# Patient Record
Sex: FEMALE | Race: WHITE | NOT HISPANIC OR LATINO
[De-identification: names, ages, dates, MRNs, and addresses within clinical notes are randomized per-mention and may not be internally consistent; named-entity substitution may affect disease eponyms.]

---

## 2017-05-15 PROBLEM — Z00.00 ENCOUNTER FOR PREVENTIVE HEALTH EXAMINATION: Status: ACTIVE | Noted: 2017-05-15

## 2017-05-17 ENCOUNTER — APPOINTMENT (OUTPATIENT)
Dept: PULMONOLOGY | Facility: CLINIC | Age: 82
End: 2017-05-17

## 2017-05-17 VITALS
OXYGEN SATURATION: 94 % | DIASTOLIC BLOOD PRESSURE: 60 MMHG | SYSTOLIC BLOOD PRESSURE: 110 MMHG | WEIGHT: 197 LBS | BODY MASS INDEX: 41.35 KG/M2 | HEIGHT: 58 IN | HEART RATE: 109 BPM

## 2017-05-17 DIAGNOSIS — Z80.1 FAMILY HISTORY OF MALIGNANT NEOPLASM OF TRACHEA, BRONCHUS AND LUNG: ICD-10-CM

## 2017-05-17 DIAGNOSIS — H04.123 DRY EYE SYNDROME OF BILATERAL LACRIMAL GLANDS: ICD-10-CM

## 2017-05-17 DIAGNOSIS — Z87.891 PERSONAL HISTORY OF NICOTINE DEPENDENCE: ICD-10-CM

## 2017-05-17 DIAGNOSIS — Z86.79 PERSONAL HISTORY OF OTHER DISEASES OF THE CIRCULATORY SYSTEM: ICD-10-CM

## 2017-05-17 DIAGNOSIS — Z82.49 FAMILY HISTORY OF ISCHEMIC HEART DISEASE AND OTHER DISEASES OF THE CIRCULATORY SYSTEM: ICD-10-CM

## 2017-05-17 RX ORDER — ASPIRIN 81 MG
81 TABLET, DELAYED RELEASE (ENTERIC COATED) ORAL
Refills: 0 | Status: ACTIVE | COMMUNITY

## 2017-05-17 RX ORDER — ERGOCALCIFEROL 1.25 MG/1
CAPSULE ORAL
Refills: 0 | Status: ACTIVE | COMMUNITY

## 2017-05-17 RX ORDER — ASCORBIC ACID 500 MG
500 TABLET ORAL
Refills: 0 | Status: ACTIVE | COMMUNITY

## 2017-05-17 RX ORDER — FUROSEMIDE 20 MG/1
20 TABLET ORAL
Refills: 0 | Status: ACTIVE | COMMUNITY

## 2017-05-17 RX ORDER — VITAMIN B COMPLEX
CAPSULE ORAL
Refills: 0 | Status: ACTIVE | COMMUNITY

## 2017-09-21 ENCOUNTER — APPOINTMENT (OUTPATIENT)
Dept: PULMONOLOGY | Facility: CLINIC | Age: 82
End: 2017-09-21

## 2018-02-16 ENCOUNTER — INPATIENT (INPATIENT)
Facility: HOSPITAL | Age: 83
LOS: 9 days | Discharge: STILL ADMITTED OR ADMINISTRATIVE | DRG: 193 | End: 2018-02-26
Attending: INTERNAL MEDICINE | Admitting: GENERAL ACUTE CARE HOSPITAL
Payer: MEDICARE

## 2018-02-16 VITALS
HEART RATE: 79 BPM | SYSTOLIC BLOOD PRESSURE: 130 MMHG | RESPIRATION RATE: 18 BRPM | TEMPERATURE: 98 F | HEIGHT: 60 IN | OXYGEN SATURATION: 94 % | WEIGHT: 199.96 LBS | DIASTOLIC BLOOD PRESSURE: 80 MMHG

## 2018-02-16 DIAGNOSIS — H26.40 UNSPECIFIED SECONDARY CATARACT: Chronic | ICD-10-CM

## 2018-02-16 DIAGNOSIS — Z45.018 ENCOUNTER FOR ADJUSTMENT AND MANAGEMENT OF OTHER PART OF CARDIAC PACEMAKER: Chronic | ICD-10-CM

## 2018-02-16 DIAGNOSIS — J18.9 PNEUMONIA, UNSPECIFIED ORGANISM: ICD-10-CM

## 2018-02-16 DIAGNOSIS — Z95.0 PRESENCE OF CARDIAC PACEMAKER: Chronic | ICD-10-CM

## 2018-02-16 DIAGNOSIS — Z98.89 OTHER SPECIFIED POSTPROCEDURAL STATES: Chronic | ICD-10-CM

## 2018-02-16 LAB
ALBUMIN SERPL ELPH-MCNC: 3.5 G/DL — SIGNIFICANT CHANGE UP (ref 3.3–5.2)
ALP SERPL-CCNC: 66 U/L — SIGNIFICANT CHANGE UP (ref 40–120)
ALT FLD-CCNC: 19 U/L — SIGNIFICANT CHANGE UP
ANION GAP SERPL CALC-SCNC: 15 MMOL/L — SIGNIFICANT CHANGE UP (ref 5–17)
APPEARANCE UR: CLEAR — SIGNIFICANT CHANGE UP
AST SERPL-CCNC: 30 U/L — SIGNIFICANT CHANGE UP
BACTERIA # UR AUTO: ABNORMAL
BASOPHILS # BLD AUTO: 0 K/UL — SIGNIFICANT CHANGE UP (ref 0–0.2)
BASOPHILS NFR BLD AUTO: 1 % — SIGNIFICANT CHANGE UP (ref 0–2)
BILIRUB SERPL-MCNC: 0.3 MG/DL — LOW (ref 0.4–2)
BILIRUB UR-MCNC: NEGATIVE — SIGNIFICANT CHANGE UP
BUN SERPL-MCNC: 36 MG/DL — HIGH (ref 8–20)
CALCIUM SERPL-MCNC: 8.9 MG/DL — SIGNIFICANT CHANGE UP (ref 8.6–10.2)
CHLORIDE SERPL-SCNC: 101 MMOL/L — SIGNIFICANT CHANGE UP (ref 98–107)
CO2 SERPL-SCNC: 21 MMOL/L — LOW (ref 22–29)
COLOR SPEC: YELLOW — SIGNIFICANT CHANGE UP
CREAT SERPL-MCNC: 1.13 MG/DL — SIGNIFICANT CHANGE UP (ref 0.5–1.3)
DIFF PNL FLD: ABNORMAL
EOSINOPHIL # BLD AUTO: 0 K/UL — SIGNIFICANT CHANGE UP (ref 0–0.5)
EPI CELLS # UR: SIGNIFICANT CHANGE UP
GLUCOSE SERPL-MCNC: 137 MG/DL — HIGH (ref 70–115)
GLUCOSE UR QL: NEGATIVE MG/DL — SIGNIFICANT CHANGE UP
HCT VFR BLD CALC: 38 % — SIGNIFICANT CHANGE UP (ref 37–47)
HGB BLD-MCNC: 12.4 G/DL — SIGNIFICANT CHANGE UP (ref 12–16)
KETONES UR-MCNC: NEGATIVE — SIGNIFICANT CHANGE UP
LEUKOCYTE ESTERASE UR-ACNC: ABNORMAL
LYMPHOCYTES # BLD AUTO: 1.3 K/UL — SIGNIFICANT CHANGE UP (ref 1–4.8)
LYMPHOCYTES # BLD AUTO: 11 % — LOW (ref 20–55)
MCHC RBC-ENTMCNC: 27.9 PG — SIGNIFICANT CHANGE UP (ref 27–31)
MCHC RBC-ENTMCNC: 32.6 G/DL — SIGNIFICANT CHANGE UP (ref 32–36)
MCV RBC AUTO: 85.6 FL — SIGNIFICANT CHANGE UP (ref 81–99)
MONOCYTES # BLD AUTO: 0.5 K/UL — SIGNIFICANT CHANGE UP (ref 0–0.8)
MONOCYTES NFR BLD AUTO: 4 % — SIGNIFICANT CHANGE UP (ref 3–10)
NEUTROPHILS # BLD AUTO: 9.6 K/UL — HIGH (ref 1.8–8)
NEUTROPHILS NFR BLD AUTO: 80 % — HIGH (ref 37–73)
NITRITE UR-MCNC: POSITIVE
NT-PROBNP SERPL-SCNC: 1332 PG/ML — HIGH (ref 0–300)
PH UR: 6 — SIGNIFICANT CHANGE UP (ref 5–8)
PLAT MORPH BLD: NORMAL — SIGNIFICANT CHANGE UP
PLATELET # BLD AUTO: 228 K/UL — SIGNIFICANT CHANGE UP (ref 150–400)
POTASSIUM SERPL-MCNC: 5 MMOL/L — SIGNIFICANT CHANGE UP (ref 3.5–5.3)
POTASSIUM SERPL-SCNC: 5 MMOL/L — SIGNIFICANT CHANGE UP (ref 3.5–5.3)
PROT SERPL-MCNC: 7 G/DL — SIGNIFICANT CHANGE UP (ref 6.6–8.7)
PROT UR-MCNC: 15 MG/DL
RAPID RVP RESULT: SIGNIFICANT CHANGE UP
RBC # BLD: 4.44 M/UL — SIGNIFICANT CHANGE UP (ref 4.4–5.2)
RBC # FLD: 15.7 % — HIGH (ref 11–15.6)
RBC BLD AUTO: NORMAL — SIGNIFICANT CHANGE UP
RBC CASTS # UR COMP ASSIST: SIGNIFICANT CHANGE UP /HPF (ref 0–4)
SODIUM SERPL-SCNC: 137 MMOL/L — SIGNIFICANT CHANGE UP (ref 135–145)
SP GR SPEC: 1.01 — SIGNIFICANT CHANGE UP (ref 1.01–1.02)
TROPONIN T SERPL-MCNC: <0.01 NG/ML — SIGNIFICANT CHANGE UP (ref 0–0.06)
UROBILINOGEN FLD QL: NEGATIVE MG/DL — SIGNIFICANT CHANGE UP
VARIANT LYMPHS # BLD: 4 % — SIGNIFICANT CHANGE UP (ref 0–6)
WBC # BLD: 11.4 K/UL — HIGH (ref 4.8–10.8)
WBC # FLD AUTO: 11.4 K/UL — HIGH (ref 4.8–10.8)
WBC UR QL: ABNORMAL

## 2018-02-16 PROCEDURE — 99285 EMERGENCY DEPT VISIT HI MDM: CPT

## 2018-02-16 PROCEDURE — 71045 X-RAY EXAM CHEST 1 VIEW: CPT | Mod: 26

## 2018-02-16 RX ORDER — METOPROLOL TARTRATE 50 MG
25 TABLET ORAL DAILY
Qty: 0 | Refills: 0 | Status: DISCONTINUED | OUTPATIENT
Start: 2018-02-16 | End: 2018-02-26

## 2018-02-16 RX ORDER — AZITHROMYCIN 500 MG/1
500 TABLET, FILM COATED ORAL ONCE
Qty: 0 | Refills: 0 | Status: DISCONTINUED | OUTPATIENT
Start: 2018-02-16 | End: 2018-02-16

## 2018-02-16 RX ORDER — TIOTROPIUM BROMIDE 18 UG/1
1 CAPSULE ORAL; RESPIRATORY (INHALATION) DAILY
Qty: 0 | Refills: 0 | Status: DISCONTINUED | OUTPATIENT
Start: 2018-02-16 | End: 2018-02-26

## 2018-02-16 RX ORDER — POTASSIUM CHLORIDE 20 MEQ
10 PACKET (EA) ORAL DAILY
Qty: 0 | Refills: 0 | Status: DISCONTINUED | OUTPATIENT
Start: 2018-02-16 | End: 2018-02-26

## 2018-02-16 RX ORDER — AZITHROMYCIN 500 MG/1
400 TABLET, FILM COATED ORAL ONCE
Qty: 0 | Refills: 0 | Status: DISCONTINUED | OUTPATIENT
Start: 2018-02-16 | End: 2018-02-16

## 2018-02-16 RX ORDER — ALBUTEROL 90 UG/1
1 AEROSOL, METERED ORAL EVERY 4 HOURS
Qty: 0 | Refills: 0 | Status: DISCONTINUED | OUTPATIENT
Start: 2018-02-16 | End: 2018-02-26

## 2018-02-16 RX ORDER — AMLODIPINE BESYLATE 2.5 MG/1
10 TABLET ORAL DAILY
Qty: 0 | Refills: 0 | Status: DISCONTINUED | OUTPATIENT
Start: 2018-02-16 | End: 2018-02-16

## 2018-02-16 RX ORDER — AZITHROMYCIN 500 MG/1
500 TABLET, FILM COATED ORAL EVERY 24 HOURS
Qty: 0 | Refills: 0 | Status: DISCONTINUED | OUTPATIENT
Start: 2018-02-17 | End: 2018-02-22

## 2018-02-16 RX ORDER — ENOXAPARIN SODIUM 100 MG/ML
40 INJECTION SUBCUTANEOUS DAILY
Qty: 0 | Refills: 0 | Status: DISCONTINUED | OUTPATIENT
Start: 2018-02-16 | End: 2018-02-26

## 2018-02-16 RX ORDER — CEFTRIAXONE 500 MG/1
1 INJECTION, POWDER, FOR SOLUTION INTRAMUSCULAR; INTRAVENOUS EVERY 24 HOURS
Qty: 0 | Refills: 0 | Status: DISCONTINUED | OUTPATIENT
Start: 2018-02-17 | End: 2018-02-26

## 2018-02-16 RX ORDER — IPRATROPIUM/ALBUTEROL SULFATE 18-103MCG
3 AEROSOL WITH ADAPTER (GRAM) INHALATION EVERY 8 HOURS
Qty: 0 | Refills: 0 | Status: DISCONTINUED | OUTPATIENT
Start: 2018-02-16 | End: 2018-02-20

## 2018-02-16 RX ORDER — AZITHROMYCIN 500 MG/1
500 TABLET, FILM COATED ORAL ONCE
Qty: 0 | Refills: 0 | Status: COMPLETED | OUTPATIENT
Start: 2018-02-16 | End: 2018-02-16

## 2018-02-16 RX ORDER — SODIUM CHLORIDE 9 MG/ML
500 INJECTION INTRAMUSCULAR; INTRAVENOUS; SUBCUTANEOUS ONCE
Qty: 0 | Refills: 0 | Status: COMPLETED | OUTPATIENT
Start: 2018-02-16 | End: 2018-02-16

## 2018-02-16 RX ORDER — SACCHAROMYCES BOULARDII 250 MG
250 POWDER IN PACKET (EA) ORAL
Qty: 0 | Refills: 0 | Status: DISCONTINUED | OUTPATIENT
Start: 2018-02-16 | End: 2018-02-26

## 2018-02-16 RX ORDER — CEFTRIAXONE 500 MG/1
1 INJECTION, POWDER, FOR SOLUTION INTRAMUSCULAR; INTRAVENOUS ONCE
Qty: 0 | Refills: 0 | Status: COMPLETED | OUTPATIENT
Start: 2018-02-16 | End: 2018-02-16

## 2018-02-16 RX ORDER — AZITHROMYCIN 500 MG/1
TABLET, FILM COATED ORAL
Qty: 0 | Refills: 0 | Status: DISCONTINUED | OUTPATIENT
Start: 2018-02-16 | End: 2018-02-22

## 2018-02-16 RX ORDER — SODIUM CHLORIDE 9 MG/ML
3 INJECTION INTRAMUSCULAR; INTRAVENOUS; SUBCUTANEOUS EVERY 8 HOURS
Qty: 0 | Refills: 0 | Status: DISCONTINUED | OUTPATIENT
Start: 2018-02-16 | End: 2018-02-26

## 2018-02-16 RX ORDER — CEFTRIAXONE 500 MG/1
1 INJECTION, POWDER, FOR SOLUTION INTRAMUSCULAR; INTRAVENOUS ONCE
Qty: 0 | Refills: 0 | Status: DISCONTINUED | OUTPATIENT
Start: 2018-02-16 | End: 2018-02-16

## 2018-02-16 RX ORDER — VALSARTAN 80 MG/1
160 TABLET ORAL DAILY
Qty: 0 | Refills: 0 | Status: DISCONTINUED | OUTPATIENT
Start: 2018-02-16 | End: 2018-02-26

## 2018-02-16 RX ORDER — ASPIRIN/CALCIUM CARB/MAGNESIUM 324 MG
81 TABLET ORAL DAILY
Qty: 0 | Refills: 0 | Status: DISCONTINUED | OUTPATIENT
Start: 2018-02-16 | End: 2018-02-17

## 2018-02-16 RX ORDER — CEFTRIAXONE 500 MG/1
INJECTION, POWDER, FOR SOLUTION INTRAMUSCULAR; INTRAVENOUS
Qty: 0 | Refills: 0 | Status: DISCONTINUED | OUTPATIENT
Start: 2018-02-16 | End: 2018-02-26

## 2018-02-16 RX ORDER — FUROSEMIDE 40 MG
20 TABLET ORAL DAILY
Qty: 0 | Refills: 0 | Status: DISCONTINUED | OUTPATIENT
Start: 2018-02-16 | End: 2018-02-26

## 2018-02-16 RX ADMIN — SODIUM CHLORIDE 500 MILLILITER(S): 9 INJECTION INTRAMUSCULAR; INTRAVENOUS; SUBCUTANEOUS at 16:13

## 2018-02-16 RX ADMIN — CEFTRIAXONE 100 GRAM(S): 500 INJECTION, POWDER, FOR SOLUTION INTRAMUSCULAR; INTRAVENOUS at 20:44

## 2018-02-16 RX ADMIN — AZITHROMYCIN 255 MILLIGRAM(S): 500 TABLET, FILM COATED ORAL at 22:12

## 2018-02-16 NOTE — ED ADULT TRIAGE NOTE - CHIEF COMPLAINT QUOTE
Sent from [facility] for SOB and hypoxia. At MD office, RA Spo2 78-84%, improvement to 93% on RA. Patient reports flu negative.

## 2018-02-16 NOTE — ED PROVIDER NOTE - CARE PLAN
Principal Discharge DX:	Pneumonia Principal Discharge DX:	Pneumonia  Secondary Diagnosis:	UTI (urinary tract infection)

## 2018-02-16 NOTE — H&P ADULT - HISTORY OF PRESENT ILLNESS
89 y/o female who lives at home alone independently with walker sent in by PMD after c/o fatigue, cough, SOB. Patient states she has been around multiple sick family members over last week. She also admits to recent treatment for UTI last week, however denies any current urinary symptoms. No CP, N/V/D. Denies hx of CHF/CAD. In ED noted to have PNA with elevated PSI and will need inpatient treatment.

## 2018-02-16 NOTE — ED ADULT NURSE REASSESSMENT NOTE - NS ED NURSE REASSESS COMMENT FT1
Assuming care from previous RN, pt AOx4, denies SOB, resp even and unlabored, sat @ 96% on 2L nasal cannula, skin warm and dry, color good, denies pain/n/v, patent 20G IV in left AC, showing NSR on monitor, pt aware of plan of care, awaiting bed assignment, will continue to monitor.

## 2018-02-16 NOTE — ED PROVIDER NOTE - OBJECTIVE STATEMENT
89yoF with h/o HTN, CHF, PPM presenting with generalized malaise, fatigue x 2 weeks.  Pt states she hasn't been dizzy, isn't having chest pain, but reports intermittent cough. She reports decreased PO intake but endorses drinking fluids. SHe was at the doctor's office today ( Dr. Segundo), when she was found to be hypoxic and was sent to ER.  PT states she was found to have  a UTI recently and was started on antibiotic "[id number]", but discontinued it after 3 days because it gave her diarrhea.

## 2018-02-16 NOTE — ED PROVIDER NOTE - MEDICAL DECISION MAKING DETAILS
Pt presents with generalized malaise for several weeks, recent partially treated UTI, and hypoxia in the setting of recent cough.  Plan to check labs, UA, CXR , reevaluate

## 2018-02-16 NOTE — H&P ADULT - PROBLEM SELECTOR PLAN 1
Patient with no recent hospitalizations. No hx of aspiration or dysphagia. Cont. CAP coverage, check lactate with SIRS and source, Normotensive, non-toxic, RVP neg. Blood cultures added, however patient received Abx in ED prior too. Supplemental 02, nebs, spirometer, OOB/Ambulate, DVT-P, fall risk protocol

## 2018-02-16 NOTE — CHART NOTE - NSCHARTNOTEFT_GEN_A_CORE
REASON FOR Tele-evaluation    SUBJECTIVE: " I just don't feel good all over, I am very tired and weak, and have very little appetite     HPI: 0, patient has been feeling tired weak and loss of appetite for few days, denied fever , admits to cough and SOB on walking with walker . denied CP or pressure , denied focal weakness , no palpitations, no orthopnea, no PND         ROS: negative except HPI      PHYSICAL EXAM: Tele-evaluation precludes physical exam. Pertinent physical exam findings as per videoconference and aide at bedside are as following: patient is sitting up in bed with nasal canula oxygen 3 liters is 94 %. sitting comfortably, AAO times three , face symmetric speech clear.      labs and CTA results reviewed       ASSESSMENT AND PLAN: 89 year old female with known history of htn CAD, CHF PPM , send in from PMD office for hypoxia- secondary to baseline COPD with Pneumonia- Rocephin Zithromax, Duoneb and supportive care , probiotics started   DVt PPX- Lovenox   CHF/htn- cont ACEI , Lasix ,   CAD -on aspirin, not on statin, please eval in Am    Care plan discussed with Chong

## 2018-02-17 DIAGNOSIS — I10 ESSENTIAL (PRIMARY) HYPERTENSION: ICD-10-CM

## 2018-02-17 DIAGNOSIS — N39.0 URINARY TRACT INFECTION, SITE NOT SPECIFIED: ICD-10-CM

## 2018-02-17 DIAGNOSIS — J18.1 LOBAR PNEUMONIA, UNSPECIFIED ORGANISM: ICD-10-CM

## 2018-02-17 LAB
HCT VFR BLD CALC: 34.8 % — LOW (ref 37–47)
HGB BLD-MCNC: 11.2 G/DL — LOW (ref 12–16)
LACTATE SERPL-SCNC: 1.6 MMOL/L — SIGNIFICANT CHANGE UP (ref 0.5–2)
LYMPHOCYTES # BLD AUTO: 9 % — LOW (ref 20–55)
MCHC RBC-ENTMCNC: 27.8 PG — SIGNIFICANT CHANGE UP (ref 27–31)
MCHC RBC-ENTMCNC: 32.2 G/DL — SIGNIFICANT CHANGE UP (ref 32–36)
MCV RBC AUTO: 86.4 FL — SIGNIFICANT CHANGE UP (ref 81–99)
MONOCYTES NFR BLD AUTO: 2 % — LOW (ref 3–10)
NEUTROPHILS NFR BLD AUTO: 89 % — HIGH (ref 37–73)
PLAT MORPH BLD: NORMAL — SIGNIFICANT CHANGE UP
PLATELET # BLD AUTO: 256 K/UL — SIGNIFICANT CHANGE UP (ref 150–400)
RBC # BLD: 4.03 M/UL — LOW (ref 4.4–5.2)
RBC # FLD: 15.8 % — HIGH (ref 11–15.6)
RBC BLD AUTO: NORMAL — SIGNIFICANT CHANGE UP
WBC # BLD: 12.5 K/UL — HIGH (ref 4.8–10.8)
WBC # FLD AUTO: 12.5 K/UL — HIGH (ref 4.8–10.8)

## 2018-02-17 PROCEDURE — 99233 SBSQ HOSP IP/OBS HIGH 50: CPT

## 2018-02-17 RX ORDER — ACETAMINOPHEN 500 MG
650 TABLET ORAL EVERY 6 HOURS
Qty: 0 | Refills: 0 | Status: DISCONTINUED | OUTPATIENT
Start: 2018-02-17 | End: 2018-02-26

## 2018-02-17 RX ORDER — NYSTATIN CREAM 100000 [USP'U]/G
1 CREAM TOPICAL
Qty: 0 | Refills: 0 | Status: DISCONTINUED | OUTPATIENT
Start: 2018-02-17 | End: 2018-02-26

## 2018-02-17 RX ORDER — ASPIRIN/CALCIUM CARB/MAGNESIUM 324 MG
81 TABLET ORAL DAILY
Qty: 0 | Refills: 0 | Status: DISCONTINUED | OUTPATIENT
Start: 2018-02-17 | End: 2018-02-17

## 2018-02-17 RX ORDER — ASPIRIN/CALCIUM CARB/MAGNESIUM 324 MG
81 TABLET ORAL DAILY
Qty: 0 | Refills: 0 | Status: DISCONTINUED | OUTPATIENT
Start: 2018-02-17 | End: 2018-02-26

## 2018-02-17 RX ADMIN — Medication 250 MILLIGRAM(S): at 06:33

## 2018-02-17 RX ADMIN — Medication 3 MILLILITER(S): at 15:21

## 2018-02-17 RX ADMIN — CEFTRIAXONE 100 GRAM(S): 500 INJECTION, POWDER, FOR SOLUTION INTRAMUSCULAR; INTRAVENOUS at 20:05

## 2018-02-17 RX ADMIN — Medication 20 MILLIGRAM(S): at 07:38

## 2018-02-17 RX ADMIN — NYSTATIN CREAM 1 APPLICATION(S): 100000 CREAM TOPICAL at 18:00

## 2018-02-17 RX ADMIN — Medication 81 MILLIGRAM(S): at 18:00

## 2018-02-17 RX ADMIN — ENOXAPARIN SODIUM 40 MILLIGRAM(S): 100 INJECTION SUBCUTANEOUS at 12:41

## 2018-02-17 RX ADMIN — SODIUM CHLORIDE 3 MILLILITER(S): 9 INJECTION INTRAMUSCULAR; INTRAVENOUS; SUBCUTANEOUS at 21:17

## 2018-02-17 RX ADMIN — Medication 3 MILLILITER(S): at 08:43

## 2018-02-17 RX ADMIN — AZITHROMYCIN 255 MILLIGRAM(S): 500 TABLET, FILM COATED ORAL at 19:11

## 2018-02-17 RX ADMIN — SODIUM CHLORIDE 3 MILLILITER(S): 9 INJECTION INTRAMUSCULAR; INTRAVENOUS; SUBCUTANEOUS at 06:32

## 2018-02-17 RX ADMIN — Medication 10 MILLIEQUIVALENT(S): at 12:41

## 2018-02-17 RX ADMIN — VALSARTAN 160 MILLIGRAM(S): 80 TABLET ORAL at 07:37

## 2018-02-17 RX ADMIN — Medication 250 MILLIGRAM(S): at 18:00

## 2018-02-17 RX ADMIN — Medication 3 MILLILITER(S): at 00:13

## 2018-02-17 RX ADMIN — NYSTATIN CREAM 1 APPLICATION(S): 100000 CREAM TOPICAL at 06:34

## 2018-02-17 RX ADMIN — Medication 25 MILLIGRAM(S): at 06:34

## 2018-02-17 RX ADMIN — SODIUM CHLORIDE 3 MILLILITER(S): 9 INJECTION INTRAMUSCULAR; INTRAVENOUS; SUBCUTANEOUS at 14:30

## 2018-02-17 NOTE — PROGRESS NOTE ADULT - PROBLEM SELECTOR PLAN 1
Patient with no recent hospitalizations. No hx of aspiration or dysphagia. Cont with current antibiotics for CAP coverage, checked lactate  WNL, Normotensive, non-toxic, RVP neg, Blood cultures added, Supplemental 02, nebs, spirometer, OOB/Ambulate.

## 2018-02-17 NOTE — PROGRESS NOTE ADULT - SUBJECTIVE AND OBJECTIVE BOX
MANUEL BURRELL    [id number]    89y      Female    Patient is a 89y old  Female who presents with a chief complaint of Cough, generalized weakness (2018 23:42)      INTERVAL HPI/OVERNIGHT EVENTS:  Patient is feeling some improvement of her respiratory symptoms, she has no chest pain, nausea, vomiting, dizziness    REVIEW OF SYSTEMS:    CONSTITUTIONAL: No fever, some fatigue  RESPIRATORY: Improving cough, shortness of breath  CARDIOVASCULAR: No chest pain, palpitations  GASTROINTESTINAL: No abdominal or epigastric pain. No nausea, vomiting  NEUROLOGICAL: No headaches,  loss of strength.  MISCELLANEOUS: No joint swelling or pain       Vital Signs Last 24 Hrs  T(C): 36.7 (2018 20:06), Max: 38.1 (2018 06:23)  T(F): 98.1 (2018 20:06), Max: 100.5 (2018 06:23)  HR: 89 (2018 20:06) (76 - 89)  BP: 106/56 (2018 20:06) (106/56 - 128/62)  BP(mean): 84 (2018 23:42) (84 - 84)  RR: 20 (2018 20:06) (16 - 20)  SpO2: 93% (2018 20:06) (93% - 97%)    PHYSICAL EXAM:    GENERAL: Elderly female looking comfortable   HEENT: PERRL, +EOMI  NECK: soft, Supple, No JVD,   CHEST/LUNG: decrease air entry bilaterally; No wheezing  HEART: S1S2+, Regular rate and rhythm; No murmurs  ABDOMEN: Soft, Nontender, Nondistended; Bowel sounds present  EXTREMITIES:  1+ Peripheral Pulses, No edema  SKIN: No rashes or lesions  NEURO: AAOX3, no focal deficits, no motor r sensory loss  PSYCH: normal mood      LABS:                        11.2   12.5  )-----------( 256      ( 2018 06:36 )             34.8     02-16    137  |  101  |  36.0<H>  ----------------------------<  137<H>  5.0   |  21.0<L>  |  1.13    Ca    8.9      2018 14:14    TPro  7.0  /  Alb  3.5  /  TBili  0.3<L>  /  DBili  x   /  AST  30  /  ALT  19  /  AlkPhos  66  02-16      Urinalysis Basic - ( 2018 17:23 )    Color: Yellow / Appearance: Clear / S.010 / pH: x  Gluc: x / Ketone: Negative  / Bili: Negative / Urobili: Negative mg/dL   Blood: x / Protein: 15 mg/dL / Nitrite: Positive   Leuk Esterase: Moderate / RBC: 0-2 /HPF / WBC 11-25   Sq Epi: x / Non Sq Epi: Occasional / Bacteria: Occasional          I&O's Summary    2018 07:01  -  2018 07:00  --------------------------------------------------------  IN: 240 mL / OUT: 0 mL / NET: 240 mL        MEDICATIONS  (STANDING):  ALBUTerol    90 MICROgram(s) HFA Inhaler 1 Puff(s) Inhalation every 4 hours  ALBUTerol/ipratropium for Nebulization 3 milliLiter(s) Nebulizer every 8 hours  aspirin enteric coated 81 milliGRAM(s) Oral daily  azithromycin  IVPB 500 milliGRAM(s) IV Intermittent every 24 hours  azithromycin  IVPB      cefTRIAXone   IVPB 1 Gram(s) IV Intermittent every 24 hours  cefTRIAXone   IVPB      enoxaparin Injectable 40 milliGRAM(s) SubCutaneous daily  furosemide    Tablet 20 milliGRAM(s) Oral daily  metoprolol     tartrate 25 milliGRAM(s) Oral daily  nystatin Powder 1 Application(s) Topical two times a day  potassium chloride    Tablet ER 10 milliEquivalent(s) Oral daily  saccharomyces boulardii 250 milliGRAM(s) Oral two times a day  sodium chloride 0.9% lock flush 3 milliLiter(s) IV Push every 8 hours  tiotropium 18 MICROgram(s) Capsule 1 Capsule(s) Inhalation daily  valsartan 160 milliGRAM(s) Oral daily    MEDICATIONS  (PRN):  acetaminophen   Tablet 650 milliGRAM(s) Oral every 6 hours PRN For Temp greater than 38 C (100.4 F), pain and headache

## 2018-02-18 LAB
-  AMIKACIN: SIGNIFICANT CHANGE UP
-  AMPICILLIN/SULBACTAM: SIGNIFICANT CHANGE UP
-  AMPICILLIN: SIGNIFICANT CHANGE UP
-  AZTREONAM: SIGNIFICANT CHANGE UP
-  CEFAZOLIN: SIGNIFICANT CHANGE UP
-  CEFEPIME: SIGNIFICANT CHANGE UP
-  CEFOXITIN: SIGNIFICANT CHANGE UP
-  CEFTAZIDIME: SIGNIFICANT CHANGE UP
-  CEFTRIAXONE: SIGNIFICANT CHANGE UP
-  CIPROFLOXACIN: SIGNIFICANT CHANGE UP
-  ERTAPENEM: SIGNIFICANT CHANGE UP
-  GENTAMICIN: SIGNIFICANT CHANGE UP
-  IMIPENEM: SIGNIFICANT CHANGE UP
-  LEVOFLOXACIN: SIGNIFICANT CHANGE UP
-  MEROPENEM: SIGNIFICANT CHANGE UP
-  NITROFURANTOIN: SIGNIFICANT CHANGE UP
-  PIPERACILLIN/TAZOBACTAM: SIGNIFICANT CHANGE UP
-  TOBRAMYCIN: SIGNIFICANT CHANGE UP
-  TRIMETHOPRIM/SULFAMETHOXAZOLE: SIGNIFICANT CHANGE UP
ANION GAP SERPL CALC-SCNC: 12 MMOL/L — SIGNIFICANT CHANGE UP (ref 5–17)
BUN SERPL-MCNC: 28 MG/DL — HIGH (ref 8–20)
CALCIUM SERPL-MCNC: 8.4 MG/DL — LOW (ref 8.6–10.2)
CHLORIDE SERPL-SCNC: 103 MMOL/L — SIGNIFICANT CHANGE UP (ref 98–107)
CO2 SERPL-SCNC: 23 MMOL/L — SIGNIFICANT CHANGE UP (ref 22–29)
CREAT SERPL-MCNC: 1.02 MG/DL — SIGNIFICANT CHANGE UP (ref 0.5–1.3)
CULTURE RESULTS: SIGNIFICANT CHANGE UP
GLUCOSE SERPL-MCNC: 130 MG/DL — HIGH (ref 70–115)
HCT VFR BLD CALC: 33.5 % — LOW (ref 37–47)
HGB BLD-MCNC: 10.6 G/DL — LOW (ref 12–16)
MCHC RBC-ENTMCNC: 27.5 PG — SIGNIFICANT CHANGE UP (ref 27–31)
MCHC RBC-ENTMCNC: 31.6 G/DL — LOW (ref 32–36)
MCV RBC AUTO: 87 FL — SIGNIFICANT CHANGE UP (ref 81–99)
METHOD TYPE: SIGNIFICANT CHANGE UP
ORGANISM # SPEC MICROSCOPIC CNT: SIGNIFICANT CHANGE UP
ORGANISM # SPEC MICROSCOPIC CNT: SIGNIFICANT CHANGE UP
PLATELET # BLD AUTO: 254 K/UL — SIGNIFICANT CHANGE UP (ref 150–400)
POTASSIUM SERPL-MCNC: 4.8 MMOL/L — SIGNIFICANT CHANGE UP (ref 3.5–5.3)
POTASSIUM SERPL-SCNC: 4.8 MMOL/L — SIGNIFICANT CHANGE UP (ref 3.5–5.3)
RBC # BLD: 3.85 M/UL — LOW (ref 4.4–5.2)
RBC # FLD: 15.9 % — HIGH (ref 11–15.6)
SODIUM SERPL-SCNC: 138 MMOL/L — SIGNIFICANT CHANGE UP (ref 135–145)
SPECIMEN SOURCE: SIGNIFICANT CHANGE UP
WBC # BLD: 10 K/UL — SIGNIFICANT CHANGE UP (ref 4.8–10.8)
WBC # FLD AUTO: 10 K/UL — SIGNIFICANT CHANGE UP (ref 4.8–10.8)

## 2018-02-18 PROCEDURE — 99233 SBSQ HOSP IP/OBS HIGH 50: CPT

## 2018-02-18 PROCEDURE — 71046 X-RAY EXAM CHEST 2 VIEWS: CPT | Mod: 26

## 2018-02-18 RX ADMIN — SODIUM CHLORIDE 3 MILLILITER(S): 9 INJECTION INTRAMUSCULAR; INTRAVENOUS; SUBCUTANEOUS at 14:12

## 2018-02-18 RX ADMIN — SODIUM CHLORIDE 3 MILLILITER(S): 9 INJECTION INTRAMUSCULAR; INTRAVENOUS; SUBCUTANEOUS at 21:08

## 2018-02-18 RX ADMIN — NYSTATIN CREAM 1 APPLICATION(S): 100000 CREAM TOPICAL at 06:11

## 2018-02-18 RX ADMIN — ENOXAPARIN SODIUM 40 MILLIGRAM(S): 100 INJECTION SUBCUTANEOUS at 11:50

## 2018-02-18 RX ADMIN — Medication 20 MILLIGRAM(S): at 06:11

## 2018-02-18 RX ADMIN — NYSTATIN CREAM 1 APPLICATION(S): 100000 CREAM TOPICAL at 17:05

## 2018-02-18 RX ADMIN — VALSARTAN 160 MILLIGRAM(S): 80 TABLET ORAL at 06:11

## 2018-02-18 RX ADMIN — CEFTRIAXONE 100 GRAM(S): 500 INJECTION, POWDER, FOR SOLUTION INTRAMUSCULAR; INTRAVENOUS at 18:25

## 2018-02-18 RX ADMIN — Medication 3 MILLILITER(S): at 08:15

## 2018-02-18 RX ADMIN — AZITHROMYCIN 255 MILLIGRAM(S): 500 TABLET, FILM COATED ORAL at 21:10

## 2018-02-18 RX ADMIN — Medication 81 MILLIGRAM(S): at 12:01

## 2018-02-18 RX ADMIN — Medication 250 MILLIGRAM(S): at 17:05

## 2018-02-18 RX ADMIN — Medication 250 MILLIGRAM(S): at 06:11

## 2018-02-18 RX ADMIN — SODIUM CHLORIDE 3 MILLILITER(S): 9 INJECTION INTRAMUSCULAR; INTRAVENOUS; SUBCUTANEOUS at 06:07

## 2018-02-18 RX ADMIN — Medication 10 MILLIEQUIVALENT(S): at 11:49

## 2018-02-18 RX ADMIN — Medication 25 MILLIGRAM(S): at 06:11

## 2018-02-18 RX ADMIN — Medication 3 MILLILITER(S): at 00:29

## 2018-02-18 NOTE — PROGRESS NOTE ADULT - PROBLEM SELECTOR PLAN 1
Patient with no recent hospitalizations, No hx of aspiration or dysphagia. Cont with current antibiotics for CAP coverage, checked lactate  WNL, Normotensive, non-toxic, RVP neg, Blood cultures added result pending, Supplemental 02, nebs, spirometer, OOB/Ambulate, will repeat CXR, will slowly wean off from the supplemental oxygen.

## 2018-02-19 LAB
ANION GAP SERPL CALC-SCNC: 11 MMOL/L — SIGNIFICANT CHANGE UP (ref 5–17)
APPEARANCE UR: ABNORMAL
BACTERIA # UR AUTO: ABNORMAL
BILIRUB UR-MCNC: NEGATIVE — SIGNIFICANT CHANGE UP
BUN SERPL-MCNC: 24 MG/DL — HIGH (ref 8–20)
CALCIUM SERPL-MCNC: 8.8 MG/DL — SIGNIFICANT CHANGE UP (ref 8.6–10.2)
CHLORIDE SERPL-SCNC: 104 MMOL/L — SIGNIFICANT CHANGE UP (ref 98–107)
CO2 SERPL-SCNC: 26 MMOL/L — SIGNIFICANT CHANGE UP (ref 22–29)
COLOR SPEC: YELLOW — SIGNIFICANT CHANGE UP
CREAT SERPL-MCNC: 0.81 MG/DL — SIGNIFICANT CHANGE UP (ref 0.5–1.3)
DIFF PNL FLD: ABNORMAL
EPI CELLS # UR: SIGNIFICANT CHANGE UP
GLUCOSE BLDC GLUCOMTR-MCNC: 162 MG/DL — HIGH (ref 70–99)
GLUCOSE SERPL-MCNC: 130 MG/DL — HIGH (ref 70–115)
GLUCOSE UR QL: NEGATIVE MG/DL — SIGNIFICANT CHANGE UP
HCT VFR BLD CALC: 35 % — LOW (ref 37–47)
HGB BLD-MCNC: 10.9 G/DL — LOW (ref 12–16)
KETONES UR-MCNC: NEGATIVE — SIGNIFICANT CHANGE UP
LEUKOCYTE ESTERASE UR-ACNC: ABNORMAL
MCHC RBC-ENTMCNC: 27.7 PG — SIGNIFICANT CHANGE UP (ref 27–31)
MCHC RBC-ENTMCNC: 31.1 G/DL — LOW (ref 32–36)
MCV RBC AUTO: 88.8 FL — SIGNIFICANT CHANGE UP (ref 81–99)
NITRITE UR-MCNC: POSITIVE
PH UR: 5 — SIGNIFICANT CHANGE UP (ref 5–8)
PLATELET # BLD AUTO: 265 K/UL — SIGNIFICANT CHANGE UP (ref 150–400)
POTASSIUM SERPL-MCNC: 5.2 MMOL/L — SIGNIFICANT CHANGE UP (ref 3.5–5.3)
POTASSIUM SERPL-SCNC: 5.2 MMOL/L — SIGNIFICANT CHANGE UP (ref 3.5–5.3)
PROT UR-MCNC: 30 MG/DL
RBC # BLD: 3.94 M/UL — LOW (ref 4.4–5.2)
RBC # FLD: 16 % — HIGH (ref 11–15.6)
RBC CASTS # UR COMP ASSIST: ABNORMAL /HPF (ref 0–4)
SODIUM SERPL-SCNC: 141 MMOL/L — SIGNIFICANT CHANGE UP (ref 135–145)
SP GR SPEC: 1.01 — SIGNIFICANT CHANGE UP (ref 1.01–1.02)
UROBILINOGEN FLD QL: NEGATIVE MG/DL — SIGNIFICANT CHANGE UP
WBC # BLD: 8.6 K/UL — SIGNIFICANT CHANGE UP (ref 4.8–10.8)
WBC # FLD AUTO: 8.6 K/UL — SIGNIFICANT CHANGE UP (ref 4.8–10.8)
WBC UR QL: SIGNIFICANT CHANGE UP

## 2018-02-19 PROCEDURE — 71046 X-RAY EXAM CHEST 2 VIEWS: CPT | Mod: 26

## 2018-02-19 PROCEDURE — 99233 SBSQ HOSP IP/OBS HIGH 50: CPT

## 2018-02-19 RX ADMIN — SODIUM CHLORIDE 3 MILLILITER(S): 9 INJECTION INTRAMUSCULAR; INTRAVENOUS; SUBCUTANEOUS at 13:44

## 2018-02-19 RX ADMIN — NYSTATIN CREAM 1 APPLICATION(S): 100000 CREAM TOPICAL at 07:31

## 2018-02-19 RX ADMIN — Medication 25 MILLIGRAM(S): at 06:38

## 2018-02-19 RX ADMIN — Medication 250 MILLIGRAM(S): at 17:50

## 2018-02-19 RX ADMIN — AZITHROMYCIN 255 MILLIGRAM(S): 500 TABLET, FILM COATED ORAL at 18:39

## 2018-02-19 RX ADMIN — Medication 10 MILLIEQUIVALENT(S): at 12:00

## 2018-02-19 RX ADMIN — SODIUM CHLORIDE 3 MILLILITER(S): 9 INJECTION INTRAMUSCULAR; INTRAVENOUS; SUBCUTANEOUS at 22:23

## 2018-02-19 RX ADMIN — NYSTATIN CREAM 1 APPLICATION(S): 100000 CREAM TOPICAL at 17:50

## 2018-02-19 RX ADMIN — VALSARTAN 160 MILLIGRAM(S): 80 TABLET ORAL at 07:30

## 2018-02-19 RX ADMIN — Medication 250 MILLIGRAM(S): at 06:38

## 2018-02-19 RX ADMIN — SODIUM CHLORIDE 3 MILLILITER(S): 9 INJECTION INTRAMUSCULAR; INTRAVENOUS; SUBCUTANEOUS at 06:29

## 2018-02-19 RX ADMIN — CEFTRIAXONE 100 GRAM(S): 500 INJECTION, POWDER, FOR SOLUTION INTRAMUSCULAR; INTRAVENOUS at 18:39

## 2018-02-19 RX ADMIN — Medication 3 MILLILITER(S): at 09:04

## 2018-02-19 RX ADMIN — Medication 20 MILLIGRAM(S): at 07:30

## 2018-02-19 RX ADMIN — Medication 3 MILLILITER(S): at 00:18

## 2018-02-19 RX ADMIN — ENOXAPARIN SODIUM 40 MILLIGRAM(S): 100 INJECTION SUBCUTANEOUS at 12:00

## 2018-02-19 RX ADMIN — Medication 81 MILLIGRAM(S): at 12:00

## 2018-02-19 RX ADMIN — Medication 3 MILLILITER(S): at 16:02

## 2018-02-19 NOTE — PROGRESS NOTE ADULT - PROBLEM SELECTOR PLAN 1
Patient with no recent hospitalizations, No hx of aspiration or dysphagia. Cont with current antibiotics for CAP coverage, checked lactate  WNL, Normotensive, non-toxic, RVP neg, Blood cultures has been negative, Supplemental 02, nebs, spirometer, OOB/Ambulate, will repeat CXR, will slowly wean off from the supplemental oxygen, she is encouraged to use incentive spirometry.

## 2018-02-19 NOTE — PROGRESS NOTE ADULT - SUBJECTIVE AND OBJECTIVE BOX
MANUEL BURRELL    [id number]    89y      Female    Patient is a 89y old  Female who presents with a chief complaint of Cough, generalized weakness (16 Feb 2018 23:42)      INTERVAL HPI/OVERNIGHT EVENTS:    Patient is feeling some improvement of her respiratory symptoms, still require 4 to 5 liters of supplemental oxygen, she has no chest pain, nausea, vomiting, dizziness, she has no urinary symptoms.     REVIEW OF SYSTEMS:    CONSTITUTIONAL: No fever, some fatigue  RESPIRATORY: Improving cough, shortness of breath  CARDIOVASCULAR: No chest pain, palpitations  GASTROINTESTINAL: No abdominal, No nausea, vomiting  NEUROLOGICAL: No headaches,  loss of strength.  MISCELLANEOUS: No joint swelling or pain          Vital Signs Last 24 Hrs  T(C): 36.4 (19 Feb 2018 08:51), Max: 37.1 (19 Feb 2018 05:36)  T(F): 97.5 (19 Feb 2018 08:51), Max: 98.8 (19 Feb 2018 05:36)  HR: 67 (19 Feb 2018 08:51) (67 - 81)  BP: 111/62 (19 Feb 2018 08:51) (103/61 - 127/68)  RR: 20 (19 Feb 2018 08:51) (20 - 20)  SpO2: 96% (19 Feb 2018 08:51) (90% - 96%)    PHYSICAL EXAM:    GENERAL: Elderly female looking comfortable   HEENT: PERRL, +EOMI  NECK: soft, Supple, No JVD,   CHEST/LUNG: decrease air entry bilaterally; right lower chest crackles and rhonchi, b/l crackles, No wheezing  HEART: S1S2+, Regular rate and rhythm; No murmurs  ABDOMEN: Soft, Nontender, Nondistended; Bowel sounds present  EXTREMITIES:  1+ Peripheral Pulses, No edema  SKIN: No rashes or lesions  NEURO: AAOX3, no focal deficits, no motor r sensory loss  PSYCH: normal mood      LABS:                        10.9   8.6   )-----------( 265      ( 19 Feb 2018 07:47 )             35.0     02-19    141  |  104  |  24.0<H>  ----------------------------<  130<H>  5.2   |  26.0  |  0.81    Ca    8.8      19 Feb 2018 07:47              I&O's Summary    18 Feb 2018 07:01  -  19 Feb 2018 07:00  --------------------------------------------------------  IN: 50 mL / OUT: 880 mL / NET: -830 mL        MEDICATIONS  (STANDING):  ALBUTerol    90 MICROgram(s) HFA Inhaler 1 Puff(s) Inhalation every 4 hours  ALBUTerol/ipratropium for Nebulization 3 milliLiter(s) Nebulizer every 8 hours  aspirin enteric coated 81 milliGRAM(s) Oral daily  azithromycin  IVPB 500 milliGRAM(s) IV Intermittent every 24 hours  azithromycin  IVPB      cefTRIAXone   IVPB 1 Gram(s) IV Intermittent every 24 hours  cefTRIAXone   IVPB      enoxaparin Injectable 40 milliGRAM(s) SubCutaneous daily  furosemide    Tablet 20 milliGRAM(s) Oral daily  metoprolol     tartrate 25 milliGRAM(s) Oral daily  nystatin Powder 1 Application(s) Topical two times a day  potassium chloride    Tablet ER 10 milliEquivalent(s) Oral daily  saccharomyces boulardii 250 milliGRAM(s) Oral two times a day  sodium chloride 0.9% lock flush 3 milliLiter(s) IV Push every 8 hours  tiotropium 18 MICROgram(s) Capsule 1 Capsule(s) Inhalation daily  valsartan 160 milliGRAM(s) Oral daily    MEDICATIONS  (PRN):  acetaminophen   Tablet 650 milliGRAM(s) Oral every 6 hours PRN For Temp greater than 38 C (100.4 F), pain and headache

## 2018-02-20 PROCEDURE — 99233 SBSQ HOSP IP/OBS HIGH 50: CPT

## 2018-02-20 RX ORDER — IPRATROPIUM/ALBUTEROL SULFATE 18-103MCG
3 AEROSOL WITH ADAPTER (GRAM) INHALATION EVERY 6 HOURS
Qty: 0 | Refills: 0 | Status: DISCONTINUED | OUTPATIENT
Start: 2018-02-20 | End: 2018-02-26

## 2018-02-20 RX ADMIN — ENOXAPARIN SODIUM 40 MILLIGRAM(S): 100 INJECTION SUBCUTANEOUS at 15:24

## 2018-02-20 RX ADMIN — NYSTATIN CREAM 1 APPLICATION(S): 100000 CREAM TOPICAL at 18:37

## 2018-02-20 RX ADMIN — NYSTATIN CREAM 1 APPLICATION(S): 100000 CREAM TOPICAL at 06:39

## 2018-02-20 RX ADMIN — Medication 20 MILLIGRAM(S): at 06:39

## 2018-02-20 RX ADMIN — Medication 3 MILLILITER(S): at 08:23

## 2018-02-20 RX ADMIN — SODIUM CHLORIDE 3 MILLILITER(S): 9 INJECTION INTRAMUSCULAR; INTRAVENOUS; SUBCUTANEOUS at 21:50

## 2018-02-20 RX ADMIN — CEFTRIAXONE 100 GRAM(S): 500 INJECTION, POWDER, FOR SOLUTION INTRAMUSCULAR; INTRAVENOUS at 21:54

## 2018-02-20 RX ADMIN — SODIUM CHLORIDE 3 MILLILITER(S): 9 INJECTION INTRAMUSCULAR; INTRAVENOUS; SUBCUTANEOUS at 15:02

## 2018-02-20 RX ADMIN — VALSARTAN 160 MILLIGRAM(S): 80 TABLET ORAL at 06:39

## 2018-02-20 RX ADMIN — Medication 250 MILLIGRAM(S): at 06:38

## 2018-02-20 RX ADMIN — Medication 250 MILLIGRAM(S): at 18:37

## 2018-02-20 RX ADMIN — Medication 25 MILLIGRAM(S): at 06:39

## 2018-02-20 RX ADMIN — SODIUM CHLORIDE 3 MILLILITER(S): 9 INJECTION INTRAMUSCULAR; INTRAVENOUS; SUBCUTANEOUS at 06:36

## 2018-02-20 RX ADMIN — AZITHROMYCIN 255 MILLIGRAM(S): 500 TABLET, FILM COATED ORAL at 21:54

## 2018-02-20 RX ADMIN — Medication 3 MILLILITER(S): at 00:36

## 2018-02-20 NOTE — PROGRESS NOTE ADULT - PROBLEM SELECTOR PLAN 1
Patient with no recent hospitalizations, No hx of aspiration or dysphagia. Cont with current antibiotics for CAP coverage, checked lactate  WNL, Normotensive, non-toxic, RVP neg, Blood cultures has been negative, Supplemental 02, nebs, spirometer, OOB/Ambulate, will repeat CXR, will slowly wean off from the supplemental oxygen, she is encouraged to use incentive spirometry, give her requirement of supplemental oxygen and unable to wean off, will get CT chest ot R/O PE. Patient with no recent hospitalizations, No hx of aspiration or dysphagia. Cont with current antibiotics for CAP coverage, checked lactate  WNL, Normotensive, non-toxic, RVP neg, Blood cultures has been negative, Supplemental 02, nebs, spirometer, OOB/Ambulate, will repeat CXR, will slowly wean off from the supplemental oxygen, she is encouraged to use incentive spirometry, she had CT chest done showed no PE, her hypoxemia is due to pneumonia.

## 2018-02-20 NOTE — PROGRESS NOTE ADULT - SUBJECTIVE AND OBJECTIVE BOX
MANUEL BURRELL    [id number]    89y      Female    Patient is a 89y old  Female who presents with a chief complaint of Cough, generalized weakness (2018 23:42)      INTERVAL HPI/OVERNIGHT EVENTS:    Patient is feeling some improvement of her respiratory symptoms, still require 4 to 5 liters of supplemental oxygen, she has no chest pain, nausea, vomiting, dizziness, she has no urinary symptoms, her UA done showed 0 to 3 WBCs, looks like contamination of previous sample     REVIEW OF SYSTEMS:    CONSTITUTIONAL: No fever, some fatigue  RESPIRATORY: Improving cough, shortness of breath  CARDIOVASCULAR: No chest pain, palpitations  GASTROINTESTINAL: No abdominal, No nausea, vomiting  NEUROLOGICAL: No headaches,  loss of strength.  MISCELLANEOUS: No joint swelling or pain        Vital Signs Last 24 Hrs  T(C): 36.7 (2018 09:16), Max: 36.7 (2018 04:55)  T(F): 98.1 (2018 09:16), Max: 98.1 (2018 09:16)  HR: 78 (2018 09:16) (74 - 78)  BP: 86/50 (2018 09:16) (86/50 - 110/70)  BP(mean): --  RR: 18 (2018 09:16) (18 - 19)  SpO2: 95% (2018 09:16) (90% - 95%)    PHYSICAL EXAM:    GENERAL: Elderly female looking comfortable   HEENT: PERRL, +EOMI  NECK: soft, Supple, No JVD,   CHEST/LUNG: decrease air entry bilaterally; right lower chest crackles and rhonchi, b/l crackles, No wheezing  HEART: S1S2+, Regular rate and rhythm; No murmurs  ABDOMEN: Soft, Nontender, Nondistended; Bowel sounds present  EXTREMITIES:  1+ Peripheral Pulses, No edema  SKIN: No rashes or lesions  NEURO: AAOX3, no focal deficits, no motor r sensory loss  PSYCH: normal mood      LABS:                        10.9   8.6   )-----------( 265      ( 2018 07:47 )             35.0     02-    141  |  104  |  24.0<H>  ----------------------------<  130<H>  5.2   |  26.0  |  0.81    Ca    8.8      2018 07:47        Urinalysis Basic - ( 2018 12:25 )    Color: Yellow / Appearance: Slightly Turbid / S.015 / pH: x  Gluc: x / Ketone: Negative  / Bili: Negative / Urobili: Negative mg/dL   Blood: x / Protein: 30 mg/dL / Nitrite: Positive   Leuk Esterase: Small / RBC: 25-50 /HPF / WBC 3-5   Sq Epi: x / Non Sq Epi: Occasional / Bacteria: Moderate          I&O's Summary    2018 07:01  -  2018 07:00  --------------------------------------------------------  IN: 418 mL / OUT: 470 mL / NET: -52 mL        MEDICATIONS  (STANDING):  ALBUTerol    90 MICROgram(s) HFA Inhaler 1 Puff(s) Inhalation every 4 hours  ALBUTerol/ipratropium for Nebulization 3 milliLiter(s) Nebulizer every 8 hours  aspirin enteric coated 81 milliGRAM(s) Oral daily  azithromycin  IVPB 500 milliGRAM(s) IV Intermittent every 24 hours  azithromycin  IVPB      cefTRIAXone   IVPB 1 Gram(s) IV Intermittent every 24 hours  cefTRIAXone   IVPB      enoxaparin Injectable 40 milliGRAM(s) SubCutaneous daily  furosemide    Tablet 20 milliGRAM(s) Oral daily  metoprolol     tartrate 25 milliGRAM(s) Oral daily  nystatin Powder 1 Application(s) Topical two times a day  potassium chloride    Tablet ER 10 milliEquivalent(s) Oral daily  saccharomyces boulardii 250 milliGRAM(s) Oral two times a day  sodium chloride 0.9% lock flush 3 milliLiter(s) IV Push every 8 hours  tiotropium 18 MICROgram(s) Capsule 1 Capsule(s) Inhalation daily  valsartan 160 milliGRAM(s) Oral daily    MEDICATIONS  (PRN):  acetaminophen   Tablet 650 milliGRAM(s) Oral every 6 hours PRN For Temp greater than 38 C (100.4 F), pain and headache

## 2018-02-21 LAB
ANION GAP SERPL CALC-SCNC: 12 MMOL/L — SIGNIFICANT CHANGE UP (ref 5–17)
BUN SERPL-MCNC: 28 MG/DL — HIGH (ref 8–20)
CALCIUM SERPL-MCNC: 8.6 MG/DL — SIGNIFICANT CHANGE UP (ref 8.6–10.2)
CHLORIDE SERPL-SCNC: 100 MMOL/L — SIGNIFICANT CHANGE UP (ref 98–107)
CO2 SERPL-SCNC: 26 MMOL/L — SIGNIFICANT CHANGE UP (ref 22–29)
CREAT SERPL-MCNC: 0.94 MG/DL — SIGNIFICANT CHANGE UP (ref 0.5–1.3)
GLUCOSE SERPL-MCNC: 113 MG/DL — SIGNIFICANT CHANGE UP (ref 70–115)
HCT VFR BLD CALC: 33.1 % — LOW (ref 37–47)
HGB BLD-MCNC: 10.5 G/DL — LOW (ref 12–16)
INR BLD: 1.21 RATIO — HIGH (ref 0.88–1.16)
MCHC RBC-ENTMCNC: 27.9 PG — SIGNIFICANT CHANGE UP (ref 27–31)
MCHC RBC-ENTMCNC: 31.7 G/DL — LOW (ref 32–36)
MCV RBC AUTO: 88 FL — SIGNIFICANT CHANGE UP (ref 81–99)
NT-PROBNP SERPL-SCNC: 336 PG/ML — HIGH (ref 0–300)
PLATELET # BLD AUTO: 308 K/UL — SIGNIFICANT CHANGE UP (ref 150–400)
POTASSIUM SERPL-MCNC: 4.8 MMOL/L — SIGNIFICANT CHANGE UP (ref 3.5–5.3)
POTASSIUM SERPL-SCNC: 4.8 MMOL/L — SIGNIFICANT CHANGE UP (ref 3.5–5.3)
PROTHROM AB SERPL-ACNC: 13.4 SEC — HIGH (ref 9.8–12.7)
RBC # BLD: 3.76 M/UL — LOW (ref 4.4–5.2)
RBC # FLD: 15.5 % — SIGNIFICANT CHANGE UP (ref 11–15.6)
SODIUM SERPL-SCNC: 138 MMOL/L — SIGNIFICANT CHANGE UP (ref 135–145)
TROPONIN T SERPL-MCNC: <0.01 NG/ML — SIGNIFICANT CHANGE UP (ref 0–0.06)
WBC # BLD: 7 K/UL — SIGNIFICANT CHANGE UP (ref 4.8–10.8)
WBC # FLD AUTO: 7 K/UL — SIGNIFICANT CHANGE UP (ref 4.8–10.8)

## 2018-02-21 PROCEDURE — 99223 1ST HOSP IP/OBS HIGH 75: CPT

## 2018-02-21 PROCEDURE — 93010 ELECTROCARDIOGRAM REPORT: CPT

## 2018-02-21 PROCEDURE — 99233 SBSQ HOSP IP/OBS HIGH 50: CPT

## 2018-02-21 RX ORDER — FUROSEMIDE 40 MG
20 TABLET ORAL ONCE
Qty: 0 | Refills: 0 | Status: COMPLETED | OUTPATIENT
Start: 2018-02-21 | End: 2018-02-21

## 2018-02-21 RX ADMIN — SODIUM CHLORIDE 3 MILLILITER(S): 9 INJECTION INTRAMUSCULAR; INTRAVENOUS; SUBCUTANEOUS at 05:25

## 2018-02-21 RX ADMIN — CEFTRIAXONE 100 GRAM(S): 500 INJECTION, POWDER, FOR SOLUTION INTRAMUSCULAR; INTRAVENOUS at 22:03

## 2018-02-21 RX ADMIN — SODIUM CHLORIDE 3 MILLILITER(S): 9 INJECTION INTRAMUSCULAR; INTRAVENOUS; SUBCUTANEOUS at 14:12

## 2018-02-21 RX ADMIN — Medication 20 MILLIGRAM(S): at 14:13

## 2018-02-21 RX ADMIN — SODIUM CHLORIDE 3 MILLILITER(S): 9 INJECTION INTRAMUSCULAR; INTRAVENOUS; SUBCUTANEOUS at 21:40

## 2018-02-21 RX ADMIN — AZITHROMYCIN 255 MILLIGRAM(S): 500 TABLET, FILM COATED ORAL at 21:41

## 2018-02-21 RX ADMIN — Medication 25 MILLIGRAM(S): at 05:26

## 2018-02-21 RX ADMIN — Medication 10 MILLIEQUIVALENT(S): at 14:13

## 2018-02-21 RX ADMIN — Medication 81 MILLIGRAM(S): at 14:13

## 2018-02-21 RX ADMIN — NYSTATIN CREAM 1 APPLICATION(S): 100000 CREAM TOPICAL at 05:26

## 2018-02-21 RX ADMIN — VALSARTAN 160 MILLIGRAM(S): 80 TABLET ORAL at 05:26

## 2018-02-21 RX ADMIN — NYSTATIN CREAM 1 APPLICATION(S): 100000 CREAM TOPICAL at 18:08

## 2018-02-21 RX ADMIN — Medication 20 MILLIGRAM(S): at 05:26

## 2018-02-21 RX ADMIN — ENOXAPARIN SODIUM 40 MILLIGRAM(S): 100 INJECTION SUBCUTANEOUS at 14:13

## 2018-02-21 RX ADMIN — Medication 250 MILLIGRAM(S): at 18:15

## 2018-02-21 RX ADMIN — Medication 250 MILLIGRAM(S): at 05:26

## 2018-02-21 NOTE — CONSULT NOTE ADULT - ASSESSMENT
Imp--CAP with ongoing hypoxemia likely due to underlying emphysema,+ morbid obesity.  Plan--finish abx.  Ambulate pt.  Will need O2 at [facility], probably for several weeks.  f/u our office.  Agree with cardiac w/u.

## 2018-02-21 NOTE — PROGRESS NOTE ADULT - PROBLEM SELECTOR PLAN 1
Patient require supplemental oxygen, not on oxygen at home, it could be due to pneumonia or due to some thing else, no PE on ct done on 0/16, will get proBNP, cycle trops, Echo some steroids and small dose of Lasix 20mg IV stat, will get pulmonary consult, will continue with duo nebs and antibiotics for pneumonia.

## 2018-02-21 NOTE — PROGRESS NOTE ADULT - PROBLEM SELECTOR PLAN 2
Patient with no recent hospitalizations, No hx of aspiration or dysphagia. Cont with current antibiotics for CAP coverage, checked lactate  WNL, Normotensive, non-toxic, RVP neg, Blood cultures has been negative, Supplemental 02, nebs, spirometer, OOB/Ambulate, will slowly wean off from the supplemental oxygen, she is encouraged to use incentive spirometry, she had CT chest done showed no PE, her hypoxemia is due to pneumonia or some thing else on top, will get proBNP, cycle trops, will get an Echo, will get pulmonary consult.

## 2018-02-21 NOTE — PROGRESS NOTE ADULT - SUBJECTIVE AND OBJECTIVE BOX
MANUEL BURRELL    [id number]    89y      Female    Patient is a 89y old  Female who presents with a chief complaint of Cough, generalized weakness (2018 23:42)      INTERVAL HPI/OVERNIGHT EVENTS:    Patient is still sob, still require 4 to 5 liters of supplemental oxygen, she has no chest pain, nausea, vomiting, dizziness, she has no urinary symptoms, her UA done showed 0 to 3 WBCs, looks like contamination of previous sample     REVIEW OF SYSTEMS:    CONSTITUTIONAL: No fever, some fatigue  RESPIRATORY: Improving cough, shortness of breath  CARDIOVASCULAR: No chest pain, palpitations  GASTROINTESTINAL: No abdominal, No nausea, vomiting  NEUROLOGICAL: No headaches,  loss of strength.  MISCELLANEOUS: No joint swelling or pain        Vital Signs Last 24 Hrs  T(C): 37 (2018 05:10), Max: 37 (2018 05:10)  T(F): 98.6 (2018 05:10), Max: 98.6 (2018 05:10)  HR: 72 (2018 05:10) (72 - 88)  BP: 116/68 (2018 05:10) (104/66 - 116/68)  RR: 17 (2018 08:00) (17 - 18)  SpO2: 91% (2018 08:00) (90% - 94%)    PHYSICAL EXAM:    GENERAL: Elderly female looking comfortable   HEENT: PERRL, +EOMI  NECK: soft, Supple, No JVD,   CHEST/LUNG: decrease air entry bilaterally; right lower chest crackles and rhonchi, b/l crackles, No wheezing  HEART: S1S2+, Regular rate and rhythm; No murmurs  ABDOMEN: Soft, Nontender, Nondistended; Bowel sounds present  EXTREMITIES:  1+ Peripheral Pulses, No edema  SKIN: No rashes or lesions  NEURO: AAOX3, no focal deficits, no motor r sensory loss  PSYCH: normal mood      LABS:                        10.5   7.0   )-----------( 308      ( 2018 06:21 )             33.1     02-    138  |  100  |  28.0<H>  ----------------------------<  113  4.8   |  26.0  |  0.94    Ca    8.6      2018 06:21        Urinalysis Basic - ( 2018 12:25 )    Color: Yellow / Appearance: Slightly Turbid / S.015 / pH: x  Gluc: x / Ketone: Negative  / Bili: Negative / Urobili: Negative mg/dL   Blood: x / Protein: 30 mg/dL / Nitrite: Positive   Leuk Esterase: Small / RBC: 25-50 /HPF / WBC 3-5   Sq Epi: x / Non Sq Epi: Occasional / Bacteria: Moderate          I&O's Summary      MEDICATIONS  (STANDING):  ALBUTerol    90 MICROgram(s) HFA Inhaler 1 Puff(s) Inhalation every 4 hours  aspirin enteric coated 81 milliGRAM(s) Oral daily  azithromycin  IVPB 500 milliGRAM(s) IV Intermittent every 24 hours  azithromycin  IVPB      cefTRIAXone   IVPB 1 Gram(s) IV Intermittent every 24 hours  cefTRIAXone   IVPB      enoxaparin Injectable 40 milliGRAM(s) SubCutaneous daily  furosemide    Tablet 20 milliGRAM(s) Oral daily  furosemide   Injectable 20 milliGRAM(s) IV Push once  metoprolol     tartrate 25 milliGRAM(s) Oral daily  nystatin Powder 1 Application(s) Topical two times a day  potassium chloride    Tablet ER 10 milliEquivalent(s) Oral daily  saccharomyces boulardii 250 milliGRAM(s) Oral two times a day  sodium chloride 0.9% lock flush 3 milliLiter(s) IV Push every 8 hours  tiotropium 18 MICROgram(s) Capsule 1 Capsule(s) Inhalation daily  valsartan 160 milliGRAM(s) Oral daily    MEDICATIONS  (PRN):  acetaminophen   Tablet 650 milliGRAM(s) Oral every 6 hours PRN For Temp greater than 38 C (100.4 F), pain and headache  ALBUTerol/ipratropium for Nebulization 3 milliLiter(s) Nebulizer every 6 hours PRN Shortness of Breath and/or Wheezing  guaiFENesin    Syrup 100 milliGRAM(s) Oral every 6 hours PRN Cough

## 2018-02-21 NOTE — CONSULT NOTE ADULT - SUBJECTIVE AND OBJECTIVE BOX
PULMONARY CONSULT NOTE      SUZY BURRELL-[id number]    Patient is a 89y old  Female who presents with a chief complaint of Cough, generalized weakness (2018 23:42)      INTERVAL HPI/OVERNIGHT EVENTS:89 y/o female who lives at home alone independently with walker sent in by PMD after c/o fatigue, cough, SOB. Patient states she has been around multiple sick family members over last week. She also admits to recent treatment for UTI last week, however denies any current urinary symptoms. No CP, N/V/D. Denies hx of CHF/CAD. In ED noted to have PNA with elevated PSI and will need inpatient treatment.   CT chest with emphysematous changes and RLL inf, basilar fibrotic changes.  Pt has been consistently hypoxemic, never has before.  Was seen in our office --Mild obstruction and restriction on sera.  No BD response. v Currently with mild SOB, denies cough, wheeze.    MEDICATIONS  (STANDING):  ALBUTerol    90 MICROgram(s) HFA Inhaler 1 Puff(s) Inhalation every 4 hours  aspirin enteric coated 81 milliGRAM(s) Oral daily  azithromycin  IVPB 500 milliGRAM(s) IV Intermittent every 24 hours  azithromycin  IVPB      cefTRIAXone   IVPB 1 Gram(s) IV Intermittent every 24 hours  cefTRIAXone   IVPB      enoxaparin Injectable 40 milliGRAM(s) SubCutaneous daily  furosemide    Tablet 20 milliGRAM(s) Oral daily  furosemide   Injectable 20 milliGRAM(s) IV Push once  metoprolol     tartrate 25 milliGRAM(s) Oral daily  nystatin Powder 1 Application(s) Topical two times a day  potassium chloride    Tablet ER 10 milliEquivalent(s) Oral daily  saccharomyces boulardii 250 milliGRAM(s) Oral two times a day  sodium chloride 0.9% lock flush 3 milliLiter(s) IV Push every 8 hours  tiotropium 18 MICROgram(s) Capsule 1 Capsule(s) Inhalation daily  valsartan 160 milliGRAM(s) Oral daily      MEDICATIONS  (PRN):  acetaminophen   Tablet 650 milliGRAM(s) Oral every 6 hours PRN For Temp greater than 38 C (100.4 F), pain and headache  ALBUTerol/ipratropium for Nebulization 3 milliLiter(s) Nebulizer every 6 hours PRN Shortness of Breath and/or Wheezing  guaiFENesin    Syrup 100 milliGRAM(s) Oral every 6 hours PRN Cough      Allergies    ibuprofen (Unknown)  levofloxacin (Hives)  tramadol (Hives)    Intolerances        PAST MEDICAL & SURGICAL HISTORY:  CAD (coronary artery disease)  Hypertension  Adjustment and management of cardiac pacemaker      FAMILY HISTORY:  No pertinent family history in first degree relatives      SOCIAL HISTORY  Smoking History: former    REVIEW OF SYSTEMS:    CONSTITUTIONAL:  As per HPI.    HEENT:  Eyes:  No diplopia or blurred vision. ENT:  No earache, sore throat or runny nose.    CARDIOVASCULAR:  No pressure, squeezing, tightness, heaviness or aching about the chest; no palpitations.    RESPIRATORY:  SOB, no cough, wheeze    GASTROINTESTINAL:  No nausea, vomiting or diarrhea.    GENITOURINARY:  No dysuria, frequency or urgency.    MUSCULOSKELETAL:  No joint pains    SKIN:  No new lesions.    NEUROLOGIC:  No paresthesias, fasciculations, seizures or weakness.    PSYCHIATRIC:  No disorder of thought or mood.    ENDOCRINE:  No heat or cold intolerance, polyuria or polydipsia.    HEMATOLOGICAL:  No easy bruising or bleeding.     Vital Signs Last 24 Hrs  T(C): 37 (2018 05:10), Max: 37 (2018 05:10)  T(F): 98.6 (2018 05:10), Max: 98.6 (2018 05:10)  HR: 72 (2018 05:10) (72 - 88)  BP: 116/68 (2018 05:10) (104/66 - 116/68)  BP(mean): --  RR: 17 (2018 08:00) (17 - 18)  SpO2: 91% (2018 08:00) (90% - 94%)    PHYSICAL EXAMINATION:    GENERAL: The patient is a well-developed, obese WF_____in no apparent distress.     HEENT: Head is normocephalic and atraumatic. Extraocular muscles are intact. Mucous membranes are moist.     NECK: Supple.     LUNGS: bibasilar crackles    HEART: Regular rate and rhythm without murmur.    ABDOMEN: Soft, nontender, and nondistended.  No hepatosplenomegaly is noted.    EXTREMITIES: Without any cyanosis, clubbing, rash, lesions or edema.    NEUROLOGIC: Grossly intact.    SKIN: No ulceration or induration present.      LABS:                        10.5   7.0   )-----------( 308      ( 2018 06:21 )             33.1     -    138  |  100  |  28.0<H>  ----------------------------<  113  4.8   |  26.0  |  0.94    Ca    8.6      2018 06:21        Urinalysis Basic - ( 2018 12:25 )    Color: Yellow / Appearance: Slightly Turbid / S.015 / pH: x  Gluc: x / Ketone: Negative  / Bili: Negative / Urobili: Negative mg/dL   Blood: x / Protein: 30 mg/dL / Nitrite: Positive   Leuk Esterase: Small / RBC: 25-50 /HPF / WBC 3-5   Sq Epi: x / Non Sq Epi: Occasional / Bacteria: Moderate                      MICROBIOLOGY:Culture - Urine (18 @ 17:23)    -  Amikacin: S <=16    -  Ampicillin: R >16    -  Ampicillin/Sulbactam: R 16/8    -  Aztreonam: R >16    -  Cefazolin: R >16    -  Cefepime: R >16    -  Cefoxitin: S <=8    -  Ceftazidime: R >16    -  Ceftriaxone: R >32    -  Ciprofloxacin: R >2    -  Ertapenem: S <=1    -  Gentamicin: R >8    -  Imipenem: S <=1    -  Levofloxacin: R >4    -  Meropenem: S <=1    -  Nitrofurantoin: S <=32    -  Piperacillin/Tazobactam: R <=16    -  Tobramycin: R >8    -  Trimethoprim/Sulfamethoxazole: S <=2/38    Specimen Source: .Urine Clean Catch (Midstream)    Culture Results:   >100,000 CFU/ml Escherichia coli ESBL  .  TYPE: (C=Critical, N=Notification, A=Abnormal) C  TESTS:  _ ESBL  DATE/TIME CALLED: _ 2018 10:56:23  CALLED TO: Ninoska Cormier RN  READ BACK (2 Patient Identifiers)(Y/N): _ Y  READ BACK VALUES (Y/N): _ Y  CALLED BY: Ninoska Michelle  Sent copy to IC and logistics.    Organism Identification: Escherichia coli ESBL    Organism: Escherichia coli ESBL    Method Type: CARY    Rapid Respiratory Viral Panel (18 @ 14:16)    Rapid RVP Result: Not[employer]: The [employer] RVP Rapid uses polymerase chain reaction (PCR) and melt  curve analysis to screen for adenovirus; coronavirus HKU1, NL63, 229E,  OC43; human metapneumovirus (hMPV); human enterovirus/rhinovirus  (Entero/RV); influenza A; influenza A/H1;influenza A/H3; influenza  A/H1-2009; influenza B; parainfluenza viruses 1, 2, 3, 4; respiratory  syncytial virus; Bordetella pertussis; Mycoplasma pneumoniae; and  Chlamydophila pneumoniae.        RADIOLOGY & ADDITIONAL STUDIES:< from: Xray Chest 2 Views PA/Lat (18 @ 12:39) >  Impression:  Bilateral lower lobe infiltrates, left larger than right. Stable exam   without significant change since the previous study..    < end of copied text >  < from: CT Angio Chest w/ IV Cont (18 @ 17:55) >    FINDINGS:  No evidence of pulmonary emboli.    No evidence of mediastinal or hilar lymphadenopathy. No evidence of   pleural or pericardial effusion. No evidence of axillary adenopathy.    Emphysematous changes diffusely. Mild atelectasis in the left lower lobe   and lingula. There is a infiltrate posteriorly at the right lung base.    Degenerative changes in the spine..            < end of copied text >

## 2018-02-22 LAB
ANION GAP SERPL CALC-SCNC: 11 MMOL/L — SIGNIFICANT CHANGE UP (ref 5–17)
BUN SERPL-MCNC: 33 MG/DL — HIGH (ref 8–20)
CALCIUM SERPL-MCNC: 8.8 MG/DL — SIGNIFICANT CHANGE UP (ref 8.6–10.2)
CHLORIDE SERPL-SCNC: 100 MMOL/L — SIGNIFICANT CHANGE UP (ref 98–107)
CO2 SERPL-SCNC: 27 MMOL/L — SIGNIFICANT CHANGE UP (ref 22–29)
CREAT SERPL-MCNC: 1 MG/DL — SIGNIFICANT CHANGE UP (ref 0.5–1.3)
CULTURE RESULTS: SIGNIFICANT CHANGE UP
GLUCOSE SERPL-MCNC: 111 MG/DL — SIGNIFICANT CHANGE UP (ref 70–115)
HCT VFR BLD CALC: 33.7 % — LOW (ref 37–47)
HGB BLD-MCNC: 10.6 G/DL — LOW (ref 12–16)
INR BLD: 1.21 RATIO — HIGH (ref 0.88–1.16)
MCHC RBC-ENTMCNC: 27.6 PG — SIGNIFICANT CHANGE UP (ref 27–31)
MCHC RBC-ENTMCNC: 31.5 G/DL — LOW (ref 32–36)
MCV RBC AUTO: 87.8 FL — SIGNIFICANT CHANGE UP (ref 81–99)
PLATELET # BLD AUTO: 341 K/UL — SIGNIFICANT CHANGE UP (ref 150–400)
POTASSIUM SERPL-MCNC: 4.7 MMOL/L — SIGNIFICANT CHANGE UP (ref 3.5–5.3)
POTASSIUM SERPL-SCNC: 4.7 MMOL/L — SIGNIFICANT CHANGE UP (ref 3.5–5.3)
PROTHROM AB SERPL-ACNC: 13.4 SEC — HIGH (ref 9.8–12.7)
RBC # BLD: 3.84 M/UL — LOW (ref 4.4–5.2)
RBC # FLD: 15.3 % — SIGNIFICANT CHANGE UP (ref 11–15.6)
SODIUM SERPL-SCNC: 138 MMOL/L — SIGNIFICANT CHANGE UP (ref 135–145)
SPECIMEN SOURCE: SIGNIFICANT CHANGE UP
TROPONIN T SERPL-MCNC: <0.01 NG/ML — SIGNIFICANT CHANGE UP (ref 0–0.06)
WBC # BLD: 7.2 K/UL — SIGNIFICANT CHANGE UP (ref 4.8–10.8)
WBC # FLD AUTO: 7.2 K/UL — SIGNIFICANT CHANGE UP (ref 4.8–10.8)

## 2018-02-22 PROCEDURE — 99232 SBSQ HOSP IP/OBS MODERATE 35: CPT

## 2018-02-22 PROCEDURE — 93306 TTE W/DOPPLER COMPLETE: CPT | Mod: 26

## 2018-02-22 RX ADMIN — NYSTATIN CREAM 1 APPLICATION(S): 100000 CREAM TOPICAL at 06:06

## 2018-02-22 RX ADMIN — NYSTATIN CREAM 1 APPLICATION(S): 100000 CREAM TOPICAL at 17:15

## 2018-02-22 RX ADMIN — VALSARTAN 160 MILLIGRAM(S): 80 TABLET ORAL at 06:07

## 2018-02-22 RX ADMIN — Medication 25 MILLIGRAM(S): at 06:07

## 2018-02-22 RX ADMIN — Medication 81 MILLIGRAM(S): at 14:47

## 2018-02-22 RX ADMIN — SODIUM CHLORIDE 3 MILLILITER(S): 9 INJECTION INTRAMUSCULAR; INTRAVENOUS; SUBCUTANEOUS at 06:04

## 2018-02-22 RX ADMIN — CEFTRIAXONE 100 GRAM(S): 500 INJECTION, POWDER, FOR SOLUTION INTRAMUSCULAR; INTRAVENOUS at 22:33

## 2018-02-22 RX ADMIN — ENOXAPARIN SODIUM 40 MILLIGRAM(S): 100 INJECTION SUBCUTANEOUS at 14:48

## 2018-02-22 RX ADMIN — SODIUM CHLORIDE 3 MILLILITER(S): 9 INJECTION INTRAMUSCULAR; INTRAVENOUS; SUBCUTANEOUS at 14:31

## 2018-02-22 RX ADMIN — Medication 20 MILLIGRAM(S): at 06:07

## 2018-02-22 RX ADMIN — Medication 250 MILLIGRAM(S): at 17:17

## 2018-02-22 RX ADMIN — SODIUM CHLORIDE 3 MILLILITER(S): 9 INJECTION INTRAMUSCULAR; INTRAVENOUS; SUBCUTANEOUS at 22:23

## 2018-02-22 RX ADMIN — Medication 250 MILLIGRAM(S): at 06:07

## 2018-02-22 RX ADMIN — Medication 10 MILLIEQUIVALENT(S): at 14:47

## 2018-02-22 NOTE — PROGRESS NOTE ADULT - PROBLEM SELECTOR PLAN 1
Patient require supplemental oxygen, not on oxygen at home, it could be due to pneumonia, no PE on ct done on 0/16, proBNP is better then before, cycle trops came negative, Echo done result pending, she has been given small dose of Lasix, will d/c as it does not looks like related to heart failure, pulmonary consult appreciated, as per Pulmonary she is well know to their service, she has Hx of emphysema, she might need home O2, will continue with duo nebs and antibiotics for pneumonia for now, will monitor, will try to wean her off.

## 2018-02-22 NOTE — PROGRESS NOTE ADULT - PROBLEM SELECTOR PLAN 2
Patient with no recent hospitalizations, No hx of aspiration or dysphagia. Cont with ceftrixone, she has completed course of azithromycin, checked lactate  WNL, Normotensive, non-toxic, RVP neg, Blood cultures has been negative, Supplemental 02, nebs, spirometer, OOB/Ambulate, will slowly wean off from the supplemental oxygen, she is encouraged to use incentive spirometry, she had CT chest done showed no PE.

## 2018-02-22 NOTE — PROGRESS NOTE ADULT - SUBJECTIVE AND OBJECTIVE BOX
MANUEL BURRELL    [id number]    89y      Female    Patient is a 89y old  Female who presents with a chief complaint of Cough, generalized weakness (16 Feb 2018 23:42)      INTERVAL HPI/OVERNIGHT EVENTS:    Patient's SOB is little better, still require 4 to 5 liters of supplemental oxygen, she has no chest pain, nausea, vomiting, dizziness, she has no urinary symptoms, her UA done showed 0 to 3 WBCs, looks like contamination of previous sample.     REVIEW OF SYSTEMS:    CONSTITUTIONAL: No fever, some fatigue  RESPIRATORY: Improving cough, shortness of breath  CARDIOVASCULAR: No chest pain, palpitations  GASTROINTESTINAL: No abdominal, No nausea, vomiting  NEUROLOGICAL: No headaches,  loss of strength.  MISCELLANEOUS: No joint swelling or pain       Vital Signs Last 24 Hrs  T(C): 36.8 (22 Feb 2018 10:59), Max: 36.8 (22 Feb 2018 06:00)  T(F): 98.3 (22 Feb 2018 10:59), Max: 98.3 (22 Feb 2018 06:00)  HR: 80 (22 Feb 2018 10:59) (80 - 86)  BP: 101/61 (22 Feb 2018 10:59) (101/61 - 143/89)  RR: 18 (22 Feb 2018 10:59) (18 - 20)  SpO2: 96% (22 Feb 2018 10:59) (69% - 96%)    PHYSICAL EXAM:    GENERAL: Elderly female looking comfortable   HEENT: PERRL, +EOMI  NECK: soft, Supple, No JVD,   CHEST/LUNG: decrease air entry bilaterally; minimal b/l crackles, No wheezing  HEART: S1S2+, Regular rate and rhythm; No murmurs  ABDOMEN: Soft, Nontender, Nondistended; Bowel sounds present  EXTREMITIES:  1+ Peripheral Pulses, No edema  SKIN: No rashes or lesions  NEURO: AAOX3, no focal deficits, no motor r sensory loss  PSYCH: normal mood      LABS:                        10.6   7.2   )-----------( 341      ( 22 Feb 2018 06:59 )             33.7     02-22    138  |  100  |  33.0<H>  ----------------------------<  111  4.7   |  27.0  |  1.00    Ca    8.8      22 Feb 2018 06:59      PT/INR - ( 22 Feb 2018 06:59 )   PT: 13.4 sec;   INR: 1.21 ratio                 I&O's Summary    21 Feb 2018 07:01  -  22 Feb 2018 07:00  --------------------------------------------------------  IN: 360 mL / OUT: 0 mL / NET: 360 mL        MEDICATIONS  (STANDING):  ALBUTerol    90 MICROgram(s) HFA Inhaler 1 Puff(s) Inhalation every 4 hours  aspirin enteric coated 81 milliGRAM(s) Oral daily  cefTRIAXone   IVPB 1 Gram(s) IV Intermittent every 24 hours  cefTRIAXone   IVPB      enoxaparin Injectable 40 milliGRAM(s) SubCutaneous daily  furosemide    Tablet 20 milliGRAM(s) Oral daily  metoprolol     tartrate 25 milliGRAM(s) Oral daily  nystatin Powder 1 Application(s) Topical two times a day  potassium chloride    Tablet ER 10 milliEquivalent(s) Oral daily  saccharomyces boulardii 250 milliGRAM(s) Oral two times a day  sodium chloride 0.9% lock flush 3 milliLiter(s) IV Push every 8 hours  tiotropium 18 MICROgram(s) Capsule 1 Capsule(s) Inhalation daily  valsartan 160 milliGRAM(s) Oral daily    MEDICATIONS  (PRN):  acetaminophen   Tablet 650 milliGRAM(s) Oral every 6 hours PRN For Temp greater than 38 C (100.4 F), pain and headache  ALBUTerol/ipratropium for Nebulization 3 milliLiter(s) Nebulizer every 6 hours PRN Shortness of Breath and/or Wheezing  guaiFENesin    Syrup 100 milliGRAM(s) Oral every 6 hours PRN Cough

## 2018-02-22 NOTE — DIETITIAN INITIAL EVALUATION ADULT. - OTHER INFO
Pt admitted for pneumonia. Pt tolerating DASH diet, good appetite/intake 100% PO. No issues at this time.

## 2018-02-23 PROCEDURE — 99232 SBSQ HOSP IP/OBS MODERATE 35: CPT

## 2018-02-23 RX ORDER — IBUPROFEN 200 MG
400 TABLET ORAL THREE TIMES A DAY
Qty: 0 | Refills: 0 | Status: DISCONTINUED | OUTPATIENT
Start: 2018-02-23 | End: 2018-02-23

## 2018-02-23 RX ORDER — ACETAMINOPHEN 500 MG
650 TABLET ORAL ONCE
Qty: 0 | Refills: 0 | Status: COMPLETED | OUTPATIENT
Start: 2018-02-23 | End: 2018-02-23

## 2018-02-23 RX ADMIN — Medication 650 MILLIGRAM(S): at 18:23

## 2018-02-23 RX ADMIN — VALSARTAN 160 MILLIGRAM(S): 80 TABLET ORAL at 06:25

## 2018-02-23 RX ADMIN — Medication 25 MILLIGRAM(S): at 06:25

## 2018-02-23 RX ADMIN — Medication 20 MILLIGRAM(S): at 06:25

## 2018-02-23 RX ADMIN — NYSTATIN CREAM 1 APPLICATION(S): 100000 CREAM TOPICAL at 17:37

## 2018-02-23 RX ADMIN — CEFTRIAXONE 100 GRAM(S): 500 INJECTION, POWDER, FOR SOLUTION INTRAMUSCULAR; INTRAVENOUS at 21:47

## 2018-02-23 RX ADMIN — SODIUM CHLORIDE 3 MILLILITER(S): 9 INJECTION INTRAMUSCULAR; INTRAVENOUS; SUBCUTANEOUS at 13:30

## 2018-02-23 RX ADMIN — Medication 10 MILLIEQUIVALENT(S): at 12:30

## 2018-02-23 RX ADMIN — Medication 250 MILLIGRAM(S): at 17:36

## 2018-02-23 RX ADMIN — SODIUM CHLORIDE 3 MILLILITER(S): 9 INJECTION INTRAMUSCULAR; INTRAVENOUS; SUBCUTANEOUS at 06:26

## 2018-02-23 RX ADMIN — Medication 650 MILLIGRAM(S): at 13:32

## 2018-02-23 RX ADMIN — Medication 81 MILLIGRAM(S): at 12:30

## 2018-02-23 RX ADMIN — ENOXAPARIN SODIUM 40 MILLIGRAM(S): 100 INJECTION SUBCUTANEOUS at 12:30

## 2018-02-23 RX ADMIN — Medication 250 MILLIGRAM(S): at 06:25

## 2018-02-23 RX ADMIN — SODIUM CHLORIDE 3 MILLILITER(S): 9 INJECTION INTRAMUSCULAR; INTRAVENOUS; SUBCUTANEOUS at 21:31

## 2018-02-23 RX ADMIN — Medication 3 MILLILITER(S): at 11:10

## 2018-02-23 RX ADMIN — NYSTATIN CREAM 1 APPLICATION(S): 100000 CREAM TOPICAL at 06:26

## 2018-02-23 NOTE — PROGRESS NOTE ADULT - PROBLEM SELECTOR PLAN 2
Patient with no recent hospitalizations, No hx of aspiration or dysphagia. Cont with ceftrixone, she has completed course of azithromycin, checked lactate  WNL, Normotensive, non-toxic, RVP neg, Blood cultures has been negative, Supplemental 02, nebs, spirometer, OOB/Ambulate, will slowly wean off from the supplemental oxygen, she is encouraged to use incentive spirometry, she had CT chest done showed no PE. Patient with no recent hospitalizations, No hx of aspiration or dysphagia. Cont with ceftrixone, she has completed course of azithromycin, checked lactate  WNL, Normotensive, non-toxic, RVP neg, Blood cultures has been negative, Supplemental 02, nebs, spirometer, OOB/Ambulate, will slowly wean off from the supplemental oxygen, she is encouraged to use incentive spirometry, she had CT chest done showed no PE, will continue antibiotics to finish 10days course, will provide pain meds for pleuritic chest pain and incentive spirometry, will repeat CXR in AM.

## 2018-02-23 NOTE — PROGRESS NOTE ADULT - SUBJECTIVE AND OBJECTIVE BOX
MANUEL BURRELL    [id number]    89y      Female    Patient is a 89y old  Female who presents with a chief complaint of Cough, generalized weakness (16 Feb 2018 23:42)      INTERVAL HPI/OVERNIGHT EVENTS:    Patient's SOB is little better, still require 4 to 5 liters of supplemental oxygen, she has no chest pain, nausea, vomiting, dizziness, she has no urinary symptoms, her UA done showed 0 to 3 WBCs, looks like contamination of previous sample.     REVIEW OF SYSTEMS:    CONSTITUTIONAL: No fever, some fatigue  RESPIRATORY: Improving cough, shortness of breath  CARDIOVASCULAR: No chest pain, palpitations  GASTROINTESTINAL: No abdominal, No nausea, vomiting  NEUROLOGICAL: No headaches,  loss of strength.  MISCELLANEOUS: No joint swelling or pain      Vital Signs Last 24 Hrs  T(C): 37.3 (23 Feb 2018 08:00), Max: 37.3 (23 Feb 2018 08:00)  T(F): 99.1 (23 Feb 2018 08:00), Max: 99.1 (23 Feb 2018 08:00)  HR: 95 (23 Feb 2018 08:00) (78 - 95)  BP: 108/70 (23 Feb 2018 08:00) (106/68 - 113/70)  RR: 20 (23 Feb 2018 08:00) (17 - 20)  SpO2: 95% (23 Feb 2018 08:00) (95% - 97%)    PHYSICAL EXAM:    GENERAL: Elderly female looking comfortable   HEENT: PERRL, +EOMI  NECK: soft, Supple, No JVD,   CHEST/LUNG: decrease air entry bilaterally; minimal b/l crackles, No wheezing  HEART: S1S2+, Regular rate and rhythm; No murmurs  ABDOMEN: Soft, Nontender, Nondistended; Bowel sounds present  EXTREMITIES:  1+ Peripheral Pulses, No edema  SKIN: No rashes or lesions  NEURO: AAOX3, no focal deficits, no motor r sensory loss  PSYCH: normal mood      LABS:                        10.6   7.2   )-----------( 341      ( 22 Feb 2018 06:59 )             33.7     02-22    138  |  100  |  33.0<H>  ----------------------------<  111  4.7   |  27.0  |  1.00    Ca    8.8      22 Feb 2018 06:59      PT/INR - ( 22 Feb 2018 06:59 )   PT: 13.4 sec;   INR: 1.21 ratio                 I&O's Summary    22 Feb 2018 07:01  -  23 Feb 2018 07:00  --------------------------------------------------------  IN: 480 mL / OUT: 0 mL / NET: 480 mL        MEDICATIONS  (STANDING):  ALBUTerol    90 MICROgram(s) HFA Inhaler 1 Puff(s) Inhalation every 4 hours  aspirin enteric coated 81 milliGRAM(s) Oral daily  cefTRIAXone   IVPB 1 Gram(s) IV Intermittent every 24 hours  cefTRIAXone   IVPB      enoxaparin Injectable 40 milliGRAM(s) SubCutaneous daily  furosemide    Tablet 20 milliGRAM(s) Oral daily  metoprolol     tartrate 25 milliGRAM(s) Oral daily  nystatin Powder 1 Application(s) Topical two times a day  potassium chloride    Tablet ER 10 milliEquivalent(s) Oral daily  saccharomyces boulardii 250 milliGRAM(s) Oral two times a day  sodium chloride 0.9% lock flush 3 milliLiter(s) IV Push every 8 hours  tiotropium 18 MICROgram(s) Capsule 1 Capsule(s) Inhalation daily  valsartan 160 milliGRAM(s) Oral daily    MEDICATIONS  (PRN):  acetaminophen   Tablet 650 milliGRAM(s) Oral every 6 hours PRN For Temp greater than 38 C (100.4 F), pain and headache  ALBUTerol/ipratropium for Nebulization 3 milliLiter(s) Nebulizer every 6 hours PRN Shortness of Breath and/or Wheezing  guaiFENesin    Syrup 100 milliGRAM(s) Oral every 6 hours PRN Cough MANUEL BURRELL    [id number]    89y      Female    Patient is a 89y old  Female who presents with a chief complaint of Cough, generalized weakness (16 Feb 2018 23:42)      INTERVAL HPI/OVERNIGHT EVENTS:    Patient's SOB is little better, her supplemental oxygen requirement is down to 3 liters now, has some pleuritic chest pain on the right lower chest, she has no nausea, vomiting, dizziness, she has no urinary symptoms, her UA done showed 0 to 3 WBCs, looks like contamination of previous sample.     REVIEW OF SYSTEMS:    CONSTITUTIONAL: No fever, some fatigue  RESPIRATORY: Improving cough, shortness of breath  CARDIOVASCULAR: some right sided pleuritic chest pain, no palpitations  GASTROINTESTINAL: No abdominal, No nausea, vomiting  NEUROLOGICAL: No headaches,  loss of strength.  MISCELLANEOUS: No joint swelling or pain      Vital Signs Last 24 Hrs  T(C): 37.3 (23 Feb 2018 08:00), Max: 37.3 (23 Feb 2018 08:00)  T(F): 99.1 (23 Feb 2018 08:00), Max: 99.1 (23 Feb 2018 08:00)  HR: 95 (23 Feb 2018 08:00) (78 - 95)  BP: 108/70 (23 Feb 2018 08:00) (106/68 - 113/70)  RR: 20 (23 Feb 2018 08:00) (17 - 20)  SpO2: 95% (23 Feb 2018 08:00) (95% - 97%)    PHYSICAL EXAM:    GENERAL: Elderly female looking comfortable   HEENT: PERRL, +EOMI  NECK: soft, Supple, No JVD,   CHEST/LUNG: decrease air entry bilaterally; minimal b/l crackles, No wheezing  HEART: S1S2+, Regular rate and rhythm; No murmurs  ABDOMEN: Soft, Nontender, Nondistended; Bowel sounds present  EXTREMITIES:  1+ Peripheral Pulses, No edema  SKIN: No rashes or lesions  NEURO: AAOX3, no focal deficits, no motor r sensory loss  PSYCH: normal mood      LABS:                        10.6   7.2   )-----------( 341      ( 22 Feb 2018 06:59 )             33.7     02-22    138  |  100  |  33.0<H>  ----------------------------<  111  4.7   |  27.0  |  1.00    Ca    8.8      22 Feb 2018 06:59      PT/INR - ( 22 Feb 2018 06:59 )   PT: 13.4 sec;   INR: 1.21 ratio                 I&O's Summary    22 Feb 2018 07:01  -  23 Feb 2018 07:00  --------------------------------------------------------  IN: 480 mL / OUT: 0 mL / NET: 480 mL        MEDICATIONS  (STANDING):  ALBUTerol    90 MICROgram(s) HFA Inhaler 1 Puff(s) Inhalation every 4 hours  aspirin enteric coated 81 milliGRAM(s) Oral daily  cefTRIAXone   IVPB 1 Gram(s) IV Intermittent every 24 hours  cefTRIAXone   IVPB      enoxaparin Injectable 40 milliGRAM(s) SubCutaneous daily  furosemide    Tablet 20 milliGRAM(s) Oral daily  metoprolol     tartrate 25 milliGRAM(s) Oral daily  nystatin Powder 1 Application(s) Topical two times a day  potassium chloride    Tablet ER 10 milliEquivalent(s) Oral daily  saccharomyces boulardii 250 milliGRAM(s) Oral two times a day  sodium chloride 0.9% lock flush 3 milliLiter(s) IV Push every 8 hours  tiotropium 18 MICROgram(s) Capsule 1 Capsule(s) Inhalation daily  valsartan 160 milliGRAM(s) Oral daily    MEDICATIONS  (PRN):  acetaminophen   Tablet 650 milliGRAM(s) Oral every 6 hours PRN For Temp greater than 38 C (100.4 F), pain and headache  ALBUTerol/ipratropium for Nebulization 3 milliLiter(s) Nebulizer every 6 hours PRN Shortness of Breath and/or Wheezing  guaiFENesin    Syrup 100 milliGRAM(s) Oral every 6 hours PRN Cough

## 2018-02-23 NOTE — PROGRESS NOTE ADULT - ASSESSMENT
Assess    CAP with hypoxemia  COPD  MO  No PE    Plan    Antibiotic course   Neb Rx - LABA/ICS when ready  02 as needed  Home or YOAN soon  OP FU in our office  Little to add

## 2018-02-23 NOTE — PHYSICAL THERAPY INITIAL EVALUATION ADULT - GAIT DEVIATIONS NOTED, PT EVAL
decreased stride length/decreased trip/decreased step length/decreased weight-shifting ability/increased time in double stance

## 2018-02-23 NOTE — PROGRESS NOTE ADULT - SUBJECTIVE AND OBJECTIVE BOX
PULMONARY PROGRESS NOTE      SUZY BURRELL-[id number]    Patient is a 89y old  Female who presents with a chief complaint of Cough, generalized weakness (16 Feb 2018 23:42)  Mild COPD   CAP   Hypoxia    INTERVAL HPI/OVERNIGHT EVENTS:  Improving    MEDICATIONS  (STANDING):  ALBUTerol    90 MICROgram(s) HFA Inhaler 1 Puff(s) Inhalation every 4 hours  aspirin enteric coated 81 milliGRAM(s) Oral daily  cefTRIAXone   IVPB 1 Gram(s) IV Intermittent every 24 hours  cefTRIAXone   IVPB      enoxaparin Injectable 40 milliGRAM(s) SubCutaneous daily  furosemide    Tablet 20 milliGRAM(s) Oral daily  metoprolol     tartrate 25 milliGRAM(s) Oral daily  nystatin Powder 1 Application(s) Topical two times a day  potassium chloride    Tablet ER 10 milliEquivalent(s) Oral daily  saccharomyces boulardii 250 milliGRAM(s) Oral two times a day  sodium chloride 0.9% lock flush 3 milliLiter(s) IV Push every 8 hours  tiotropium 18 MICROgram(s) Capsule 1 Capsule(s) Inhalation daily  valsartan 160 milliGRAM(s) Oral daily      MEDICATIONS  (PRN):  acetaminophen   Tablet 650 milliGRAM(s) Oral every 6 hours PRN For Temp greater than 38 C (100.4 F), pain and headache  ALBUTerol/ipratropium for Nebulization 3 milliLiter(s) Nebulizer every 6 hours PRN Shortness of Breath and/or Wheezing  guaiFENesin    Syrup 100 milliGRAM(s) Oral every 6 hours PRN Cough      Allergies    ibuprofen (Unknown)  levofloxacin (Hives)  tramadol (Hives)    Intolerances        PAST MEDICAL & SURGICAL HISTORY:  CAD (coronary artery disease)  Hypertension  Adjustment and management of cardiac pacemaker      SOCIAL HISTORY  Smoking History:       REVIEW OF SYSTEMS:    CONSTITUTIONAL:  No distress    HEENT:  Eyes:  No diplopia or blurred vision. ENT:  No earache, sore throat or runny nose.    CARDIOVASCULAR:  No pressure, squeezing, tightness, heaviness or aching about the chest; no palpitations.    RESPIRATORY:  No cough, shortness of breath, PND or orthopnea. Mild SOBOE    GASTROINTESTINAL:  No nausea, vomiting or diarrhea.    GENITOURINARY:  No dysuria, frequency or urgency.    NEUROLOGIC:  No paresthesias, fasciculations, seizures or weakness.    PSYCHIATRIC:  No disorder of thought or mood.    Vital Signs Last 24 Hrs  T(C): 37.3 (23 Feb 2018 08:00), Max: 37.3 (23 Feb 2018 08:00)  T(F): 99.1 (23 Feb 2018 08:00), Max: 99.1 (23 Feb 2018 08:00)  HR: 95 (23 Feb 2018 08:00) (78 - 95)  BP: 108/70 (23 Feb 2018 08:00) (106/68 - 113/70)  BP(mean): --  RR: 20 (23 Feb 2018 08:00) (17 - 20)  SpO2: 95% (23 Feb 2018 08:00) (95% - 97%)    PHYSICAL EXAMINATION:    GENERAL: The patient is awake and alert in no apparent distress.     HEENT: Head is normocephalic and atraumatic. Extraocular muscles are intact. Mucous membranes are moist.    NECK: Supple.    LUNGS: Clear to auscultation without wheezing, rales or rhonchi; respirations unlabored    HEART: Regular rate and rhythm without murmur.    ABDOMEN: Soft, nontender, and nondistended.      EXTREMITIES: Without any cyanosis, clubbing, rash, lesions or edema.    NEUROLOGIC: Grossly intact.    LABS:                        10.6   7.2   )-----------( 341      ( 22 Feb 2018 06:59 )             33.7     02-22    138  |  100  |  33.0<H>  ----------------------------<  111  4.7   |  27.0  |  1.00    Ca    8.8      22 Feb 2018 06:59      PT/INR - ( 22 Feb 2018 06:59 )   PT: 13.4 sec;   INR: 1.21 ratio               CARDIAC MARKERS ( 22 Feb 2018 06:59 )  x     / <0.01 ng/mL / x     / x     / x            Serum Pro-Brain Natriuretic Peptide: 336 pg/mL (02-21-18 @ 13:09)          MICROBIOLOGY: RVP neg    RADIOLOGY & ADDITIONAL STUDIES:         EXAM:  XR CHEST PA LAT 2V                          PROCEDURE DATE:  02/19/2018      INTERPRETATION:    CHEST X-RAY PA AND LATERAL:    History: Hypoxia.    Date and time of exam: 2/19/2018 12:38 PM.    Comparison exam: 2/18/2018.    Technique: PA and lateral views of the chest were obtained.    Findings:  There is an infiltrate in the left lower lobe. Less pronounced infiltrate   in the right lower lobe. These findings are unchanged. The heart and   mediastinum unremarkable. No definite evidence of a pleural effusion.    Impression:  Bilateral lower lobe infiltrates, left larger than right. Stable exam   without significant change since the previous study..    LAURA HEATH M.D., ATTENDING RADIOLOGIST  This document has been electronically signed. Feb 19 2018  1:53PM

## 2018-02-23 NOTE — PROGRESS NOTE ADULT - PROBLEM SELECTOR PLAN 1
Patient require supplemental oxygen, not on oxygen at home, it could be due to pneumonia, no PE on ct done on 0/16, proBNP is better then before, cycle trops came negative, Echo done result pending, she has been given small dose of Lasix, will d/c as it does not looks like related to heart failure, pulmonary consult appreciated, as per Pulmonary she is well know to their service, she has Hx of emphysema, she might need home O2, will continue with duo nebs and antibiotics for pneumonia for now, will monitor, will try to wean her off. Patient require supplemental oxygen, not on oxygen at home, it could be due to pneumonia, no PE on ct done on 0/16, proBNP is better then before, cycle trops came negative, Echo done result pending, she has been given small dose of Lasix, will d/c as it does not looks like related to heart failure, pulmonary consult appreciated, as per Pulmonary she is well know to their service, she has Hx of emphysema, she might need home O2, will continue with duo nebs and antibiotics for pneumonia for now, will monitor, her supplemental oxygen requirement is down to 3 liters now.

## 2018-02-24 PROCEDURE — 71046 X-RAY EXAM CHEST 2 VIEWS: CPT | Mod: 26

## 2018-02-24 PROCEDURE — 99232 SBSQ HOSP IP/OBS MODERATE 35: CPT

## 2018-02-24 RX ADMIN — Medication 81 MILLIGRAM(S): at 12:22

## 2018-02-24 RX ADMIN — VALSARTAN 160 MILLIGRAM(S): 80 TABLET ORAL at 05:52

## 2018-02-24 RX ADMIN — NYSTATIN CREAM 1 APPLICATION(S): 100000 CREAM TOPICAL at 17:07

## 2018-02-24 RX ADMIN — Medication 10 MILLIEQUIVALENT(S): at 12:22

## 2018-02-24 RX ADMIN — ENOXAPARIN SODIUM 40 MILLIGRAM(S): 100 INJECTION SUBCUTANEOUS at 12:22

## 2018-02-24 RX ADMIN — Medication 250 MILLIGRAM(S): at 05:52

## 2018-02-24 RX ADMIN — Medication 20 MILLIGRAM(S): at 05:52

## 2018-02-24 RX ADMIN — SODIUM CHLORIDE 3 MILLILITER(S): 9 INJECTION INTRAMUSCULAR; INTRAVENOUS; SUBCUTANEOUS at 12:17

## 2018-02-24 RX ADMIN — NYSTATIN CREAM 1 APPLICATION(S): 100000 CREAM TOPICAL at 05:52

## 2018-02-24 RX ADMIN — CEFTRIAXONE 100 GRAM(S): 500 INJECTION, POWDER, FOR SOLUTION INTRAMUSCULAR; INTRAVENOUS at 21:33

## 2018-02-24 RX ADMIN — SODIUM CHLORIDE 3 MILLILITER(S): 9 INJECTION INTRAMUSCULAR; INTRAVENOUS; SUBCUTANEOUS at 05:50

## 2018-02-24 RX ADMIN — Medication 250 MILLIGRAM(S): at 17:08

## 2018-02-24 RX ADMIN — SODIUM CHLORIDE 3 MILLILITER(S): 9 INJECTION INTRAMUSCULAR; INTRAVENOUS; SUBCUTANEOUS at 21:12

## 2018-02-24 NOTE — PROGRESS NOTE ADULT - SUBJECTIVE AND OBJECTIVE BOX
MANUEL BURRELL    [id number]    89y      Female    Patient is a 89y old  Female who presents with a chief complaint of Cough, generalized weakness (16 Feb 2018 23:42)      INTERVAL HPI/OVERNIGHT EVENTS:    Patient's SOB is little better, her supplemental oxygen requirement is down to 3 liters now, has some pleuritic chest pain on the right lower chest, she has no nausea, vomiting, dizziness, she has no urinary symptoms, her UA done showed 0 to 3 WBCs, looks like contamination of previous sample.     REVIEW OF SYSTEMS:    CONSTITUTIONAL: No fever, some fatigue  RESPIRATORY: Improving cough, shortness of breath  CARDIOVASCULAR: some right sided pleuritic chest pain, no palpitations  GASTROINTESTINAL: No abdominal, No nausea, vomiting  NEUROLOGICAL: No headaches,  loss of strength.  MISCELLANEOUS: No joint swelling or pain       Vital Signs Last 24 Hrs  T(C): 37.1 (24 Feb 2018 04:56), Max: 37.1 (24 Feb 2018 04:56)  T(F): 98.7 (24 Feb 2018 04:56), Max: 98.7 (24 Feb 2018 04:56)  HR: 89 (24 Feb 2018 04:56) (89 - 90)  BP: 102/70 (24 Feb 2018 04:56) (102/70 - 110/64)  RR: 18 (24 Feb 2018 04:56) (18 - 20)  SpO2: 96% (24 Feb 2018 04:56) (95% - 96%)    PHYSICAL EXAM:    GENERAL: Elderly female looking comfortable   HEENT: PERRL, +EOMI  NECK: soft, Supple, No JVD,   CHEST/LUNG: decrease air entry bilaterally; minimal b/l crackles, No wheezing  HEART: S1S2+, Regular rate and rhythm; No murmurs  ABDOMEN: Soft, Nontender, Nondistended; Bowel sounds present  EXTREMITIES:  1+ Peripheral Pulses, No edema  SKIN: No rashes or lesions  NEURO: AAOX3, no focal deficits, no motor r sensory loss  PSYCH: normal mood        23 Feb 2018 07:01  -  24 Feb 2018 07:00  --------------------------------------------------------  IN: 240 mL / OUT: 0 mL / NET: 240 mL        MEDICATIONS  (STANDING):  ALBUTerol    90 MICROgram(s) HFA Inhaler 1 Puff(s) Inhalation every 4 hours  aspirin enteric coated 81 milliGRAM(s) Oral daily  cefTRIAXone   IVPB 1 Gram(s) IV Intermittent every 24 hours  cefTRIAXone   IVPB      enoxaparin Injectable 40 milliGRAM(s) SubCutaneous daily  furosemide    Tablet 20 milliGRAM(s) Oral daily  metoprolol     tartrate 25 milliGRAM(s) Oral daily  nystatin Powder 1 Application(s) Topical two times a day  potassium chloride    Tablet ER 10 milliEquivalent(s) Oral daily  saccharomyces boulardii 250 milliGRAM(s) Oral two times a day  sodium chloride 0.9% lock flush 3 milliLiter(s) IV Push every 8 hours  tiotropium 18 MICROgram(s) Capsule 1 Capsule(s) Inhalation daily  valsartan 160 milliGRAM(s) Oral daily    MEDICATIONS  (PRN):  acetaminophen   Tablet 650 milliGRAM(s) Oral every 6 hours PRN For Temp greater than 38 C (100.4 F), pain and headache  ALBUTerol/ipratropium for Nebulization 3 milliLiter(s) Nebulizer every 6 hours PRN Shortness of Breath and/or Wheezing  guaiFENesin    Syrup 100 milliGRAM(s) Oral every 6 hours PRN Cough MANUEL UBRRELL    [id number]    89y      Female    Patient is a 89y old  Female who presents with a chief complaint of Cough, generalized weakness (16 Feb 2018 23:42)      INTERVAL HPI/OVERNIGHT EVENTS:    Patient's SOB is little better, her supplemental oxygen requirement is down to 3 liters now, has some pleuritic chest pain on the right lower chest, chest xary to repeat, she has no nausea, vomiting, dizziness, she has no urinary symptoms, her UA done showed 0 to 3 WBCs, looks like contamination of previous sample.     REVIEW OF SYSTEMS:    CONSTITUTIONAL: No fever, some fatigue  RESPIRATORY: Improving cough, shortness of breath  CARDIOVASCULAR: some right sided pleuritic chest pain, no palpitations  GASTROINTESTINAL: No abdominal, No nausea, vomiting  NEUROLOGICAL: No headaches,  loss of strength.  MISCELLANEOUS: No joint swelling or pain       Vital Signs Last 24 Hrs  T(C): 37.1 (24 Feb 2018 04:56), Max: 37.1 (24 Feb 2018 04:56)  T(F): 98.7 (24 Feb 2018 04:56), Max: 98.7 (24 Feb 2018 04:56)  HR: 89 (24 Feb 2018 04:56) (89 - 90)  BP: 102/70 (24 Feb 2018 04:56) (102/70 - 110/64)  RR: 18 (24 Feb 2018 04:56) (18 - 20)  SpO2: 96% (24 Feb 2018 04:56) (95% - 96%)    PHYSICAL EXAM:    GENERAL: Elderly female looking comfortable   HEENT: PERRL, +EOMI  NECK: soft, Supple, No JVD,   CHEST/LUNG: decrease air entry bilaterally; minimal b/l crackles, No wheezing  HEART: S1S2+, Regular rate and rhythm; No murmurs  ABDOMEN: Soft, Nontender, Nondistended; Bowel sounds present  EXTREMITIES:  1+ Peripheral Pulses, No edema  SKIN: No rashes or lesions  NEURO: AAOX3, no focal deficits, no motor r sensory loss  PSYCH: normal mood        23 Feb 2018 07:01  -  24 Feb 2018 07:00  --------------------------------------------------------  IN: 240 mL / OUT: 0 mL / NET: 240 mL        MEDICATIONS  (STANDING):  ALBUTerol    90 MICROgram(s) HFA Inhaler 1 Puff(s) Inhalation every 4 hours  aspirin enteric coated 81 milliGRAM(s) Oral daily  cefTRIAXone   IVPB 1 Gram(s) IV Intermittent every 24 hours  cefTRIAXone   IVPB      enoxaparin Injectable 40 milliGRAM(s) SubCutaneous daily  furosemide    Tablet 20 milliGRAM(s) Oral daily  metoprolol     tartrate 25 milliGRAM(s) Oral daily  nystatin Powder 1 Application(s) Topical two times a day  potassium chloride    Tablet ER 10 milliEquivalent(s) Oral daily  saccharomyces boulardii 250 milliGRAM(s) Oral two times a day  sodium chloride 0.9% lock flush 3 milliLiter(s) IV Push every 8 hours  tiotropium 18 MICROgram(s) Capsule 1 Capsule(s) Inhalation daily  valsartan 160 milliGRAM(s) Oral daily    MEDICATIONS  (PRN):  acetaminophen   Tablet 650 milliGRAM(s) Oral every 6 hours PRN For Temp greater than 38 C (100.4 F), pain and headache  ALBUTerol/ipratropium for Nebulization 3 milliLiter(s) Nebulizer every 6 hours PRN Shortness of Breath and/or Wheezing  guaiFENesin    Syrup 100 milliGRAM(s) Oral every 6 hours PRN Cough

## 2018-02-24 NOTE — PROGRESS NOTE ADULT - PROBLEM SELECTOR PLAN 2
Patient with no recent hospitalizations, No hx of aspiration or dysphagia. Cont with ceftrixone, she has completed course of azithromycin, checked lactate  WNL, Normotensive, non-toxic, RVP neg, Blood cultures has been negative, Supplemental 02, nebs, spirometer, OOB/Ambulate, will slowly wean off from the supplemental oxygen, she is encouraged to use incentive spirometry, she had CT chest done showed no PE, will continue antibiotics to finish 10days course, will provide pain meds for pleuritic chest pain and incentive spirometry, will repeat CXR in AM. Patient with no recent hospitalizations, No hx of aspiration or dysphagia. Cont with ceftrixone, she has completed course of azithromycin, checked lactate  WNL, Normotensive, non-toxic, RVP neg, Blood cultures has been negative, Supplemental 02, nebs, spirometer, OOB/Ambulate, will slowly wean off from the supplemental oxygen, she is encouraged to use incentive spirometry, she had CT chest done showed no PE, will continue antibiotics to finish 10days course, will provide pain meds for pleuritic chest pain and incentive spirometry, repeat CXR showed Residual right lung base  focal airspace consolidation, will continue with incentive spirometry.

## 2018-02-24 NOTE — PROGRESS NOTE ADULT - PROBLEM SELECTOR PLAN 1
Patient require supplemental oxygen, not on oxygen at home, it could be due to pneumonia, no PE on ct done on 0/16, proBNP is better then before, cycle trops came negative, Echo done result pending, she has been given small dose of Lasix, will d/c as it does not looks like related to heart failure, pulmonary consult appreciated, as per Pulmonary she is well know to their service, she has Hx of emphysema, she might need home O2, will continue with duo nebs and antibiotics for pneumonia for now, will monitor, her supplemental oxygen requirement is down to 3 liters now. Patient require supplemental oxygen, not on oxygen at home, it could be due to pneumonia, no PE on ct done on 0/16, proBNP is better then before, cycle trops came negative, Echo done result pending, she has been given small dose of Lasix, will d/c as it does not looks like related to heart failure, pulmonary consult appreciated, as per Pulmonary she is well know to their service, she has Hx of emphysema, she might need home O2, will continue with duo nebs and antibiotics for pneumonia for now, will monitor, her supplemental oxygen requirement is down to 3 liters now, will continue to wean her off.

## 2018-02-24 NOTE — PROGRESS NOTE ADULT - ASSESSMENT
89 y/o female with CAP, early sepsis, hypoxia, form Pneumonia, hx of HTN, PPM, UTI No significant past surgical history

## 2018-02-25 PROCEDURE — 99232 SBSQ HOSP IP/OBS MODERATE 35: CPT

## 2018-02-25 RX ADMIN — SODIUM CHLORIDE 3 MILLILITER(S): 9 INJECTION INTRAMUSCULAR; INTRAVENOUS; SUBCUTANEOUS at 05:19

## 2018-02-25 RX ADMIN — SODIUM CHLORIDE 3 MILLILITER(S): 9 INJECTION INTRAMUSCULAR; INTRAVENOUS; SUBCUTANEOUS at 12:23

## 2018-02-25 RX ADMIN — NYSTATIN CREAM 1 APPLICATION(S): 100000 CREAM TOPICAL at 05:29

## 2018-02-25 RX ADMIN — Medication 250 MILLIGRAM(S): at 17:40

## 2018-02-25 RX ADMIN — Medication 250 MILLIGRAM(S): at 05:29

## 2018-02-25 RX ADMIN — CEFTRIAXONE 100 GRAM(S): 500 INJECTION, POWDER, FOR SOLUTION INTRAMUSCULAR; INTRAVENOUS at 21:25

## 2018-02-25 RX ADMIN — Medication 10 MILLIEQUIVALENT(S): at 12:23

## 2018-02-25 RX ADMIN — ENOXAPARIN SODIUM 40 MILLIGRAM(S): 100 INJECTION SUBCUTANEOUS at 12:23

## 2018-02-25 RX ADMIN — Medication 81 MILLIGRAM(S): at 12:23

## 2018-02-25 RX ADMIN — Medication 650 MILLIGRAM(S): at 21:24

## 2018-02-25 RX ADMIN — NYSTATIN CREAM 1 APPLICATION(S): 100000 CREAM TOPICAL at 17:40

## 2018-02-25 RX ADMIN — SODIUM CHLORIDE 3 MILLILITER(S): 9 INJECTION INTRAMUSCULAR; INTRAVENOUS; SUBCUTANEOUS at 21:25

## 2018-02-25 RX ADMIN — Medication 20 MILLIGRAM(S): at 05:29

## 2018-02-25 NOTE — PROGRESS NOTE ADULT - PROBLEM SELECTOR PLAN 5
urine cx is growing ESBL Ecoli, patient has no symptoms of UTI, her WBC count is came down, looks like contamination, repeated UA looks ok with WBCs in urine 0-3, discussed with ID verbally.

## 2018-02-25 NOTE — PROGRESS NOTE ADULT - PROBLEM SELECTOR PLAN 3
Sepsis is resolved, WBCs are down, will continued with current management.
Sepsis is resolved, WBCs are down, will continued with current management.
DASH diet, cont. Valsartan. Satpricilla she no longer takes Norvasc.
Sepsis is resolved, WBCs are down, will continued with current management.

## 2018-02-25 NOTE — PROGRESS NOTE ADULT - ASSESSMENT
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   for her Pneumonia of right lower lobe, Patient with no recent hospitalizations, No hx of aspiration or dysphagia, Cont with ceftrixone, she has completed course of azithromycin, checked lactate  WNL, Normotensive, non-toxic, RVP neg, Blood cultures has been negative, Supplemental 02, nebs, spirometer, she had CT chest done showed no PE, antibiotics to finish 10days course, pain meds for pleuritic chest pain and incentive spirometry, repeat CXR showed Residual right lung base  focal airspace consolidation, sepsis is resolved, WBCs are down.   her urine cx growing ESBL Ecoli, patient has no symptoms of UTI, her WBC count came down, looks like contamination, repeated UA looks ok with WBCs in urine 0-3, discussed with ID verbally no need for further antibiotics.

## 2018-02-25 NOTE — PROGRESS NOTE ADULT - PROBLEM SELECTOR PROBLEM 4
Essential hypertension
Urinary tract infection without hematuria, site unspecified

## 2018-02-25 NOTE — PROGRESS NOTE ADULT - PROBLEM SELECTOR PROBLEM 5
Urinary tract infection without hematuria, site unspecified

## 2018-02-25 NOTE — PROGRESS NOTE ADULT - PROBLEM SELECTOR PLAN 4
DASH diet, cont. Valsartan. Satpricilla she no longer takes Norvasc.
urine cx is growing ESBL Ecoli, patient has no symptoms of UTI, her WBC count is came down, looks like contamination, repeated UA looks ok with WBCs in urine 0-3, discussed with ID verbally.
urine cx is growing ESBL Ecoli, patient has no symptoms of UTI, her WBC count is came down, looks like contamination, will repeat UA, discussed with ID verbally.
urine cx is growing gram negative rods, Already on Rocephin for CAP, Has no current symptoms, will follow sensitivity.
urine cx is growing gram negative rods, Already on Rocephin for CAP, Has no current symptoms, will follow sensitivity.

## 2018-02-25 NOTE — PROGRESS NOTE ADULT - SUBJECTIVE AND OBJECTIVE BOX
MANUEL BURRELL    [id number]    89y      Female    Patient is a 89y old  Female who presents with a chief complaint of Cough, generalized weakness (16 Feb 2018 23:42)      INTERVAL HPI/OVERNIGHT EVENTS:    Patient's SOB is little better, her supplemental oxygen requirement is down to 3 liters now, has some pleuritic chest pain on the right lower chest, chest xary to repeat, she has no nausea, vomiting, dizziness, she has no urinary symptoms, her UA done showed 0 to 3 WBCs, looks like contamination of previous sample.     REVIEW OF SYSTEMS:    CONSTITUTIONAL: No fever, some fatigue  RESPIRATORY: Improving cough, shortness of breath  CARDIOVASCULAR: some right sided pleuritic chest pain, no palpitations  GASTROINTESTINAL: No abdominal, No nausea, vomiting  NEUROLOGICAL: No headaches,  loss of strength.  MISCELLANEOUS: No joint swelling or pain       Vital Signs Last 24 Hrs  T(C): 36.6 (25 Feb 2018 09:08), Max: 36.8 (25 Feb 2018 04:47)  T(F): 97.9 (25 Feb 2018 09:08), Max: 98.2 (25 Feb 2018 04:47)  HR: 69 (25 Feb 2018 09:08) (69 - 92)  BP: 119/67 (25 Feb 2018 09:08) (100/50 - 119/67)  BP(mean): --  RR: 18 (25 Feb 2018 09:08) (18 - 20)  SpO2: 93% (25 Feb 2018 09:08) (92% - 95%)    PHYSICAL EXAM:    GENERAL: Elderly female looking comfortable   HEENT: PERRL, +EOMI  NECK: soft, Supple, No JVD,   CHEST/LUNG: decrease air entry bilaterally; minimal b/l crackles, No wheezing  HEART: S1S2+, Regular rate and rhythm; No murmurs  ABDOMEN: Soft, Nontender, Nondistended; Bowel sounds present  EXTREMITIES:  1+ Peripheral Pulses, No edema  SKIN: No rashes or lesions  NEURO: AAOX3, no focal deficits, no motor r sensory loss  PSYCH: normal mood      LABS:                  I&O's Summary    24 Feb 2018 07:01  -  25 Feb 2018 07:00  --------------------------------------------------------  IN: 240 mL / OUT: 0 mL / NET: 240 mL        MEDICATIONS  (STANDING):  ALBUTerol    90 MICROgram(s) HFA Inhaler 1 Puff(s) Inhalation every 4 hours  aspirin enteric coated 81 milliGRAM(s) Oral daily  cefTRIAXone   IVPB 1 Gram(s) IV Intermittent every 24 hours  cefTRIAXone   IVPB      enoxaparin Injectable 40 milliGRAM(s) SubCutaneous daily  furosemide    Tablet 20 milliGRAM(s) Oral daily  metoprolol     tartrate 25 milliGRAM(s) Oral daily  nystatin Powder 1 Application(s) Topical two times a day  potassium chloride    Tablet ER 10 milliEquivalent(s) Oral daily  saccharomyces boulardii 250 milliGRAM(s) Oral two times a day  sodium chloride 0.9% lock flush 3 milliLiter(s) IV Push every 8 hours  tiotropium 18 MICROgram(s) Capsule 1 Capsule(s) Inhalation daily  valsartan 160 milliGRAM(s) Oral daily    MEDICATIONS  (PRN):  acetaminophen   Tablet 650 milliGRAM(s) Oral every 6 hours PRN For Temp greater than 38 C (100.4 F), pain and headache  ALBUTerol/ipratropium for Nebulization 3 milliLiter(s) Nebulizer every 6 hours PRN Shortness of Breath and/or Wheezing  guaiFENesin    Syrup 100 milliGRAM(s) Oral every 6 hours PRN Cough MANUEL BURRELL    [id number]    89y      Female    Patient is a 89y old  Female who presents with a chief complaint of Cough, generalized weakness (16 Feb 2018 23:42)      INTERVAL HPI/OVERNIGHT EVENTS:    Patient's SOB is little better, her supplemental oxygen requirement 3 liters, has some pleuritic chest pain on the right lower chest, chest xary done showed residual right lung base  focal airspace consolidation, she has no nausea, vomiting, dizziness, she has no urinary symptoms.     REVIEW OF SYSTEMS:    CONSTITUTIONAL: No fever, some fatigue  RESPIRATORY: Improving cough, shortness of breath  CARDIOVASCULAR: some right sided pleuritic chest pain, no palpitations  GASTROINTESTINAL: No abdominal, No nausea, vomiting  NEUROLOGICAL: No headaches,  loss of strength.  MISCELLANEOUS: No joint swelling or pain       Vital Signs Last 24 Hrs  T(C): 36.6 (25 Feb 2018 09:08), Max: 36.8 (25 Feb 2018 04:47)  T(F): 97.9 (25 Feb 2018 09:08), Max: 98.2 (25 Feb 2018 04:47)  HR: 69 (25 Feb 2018 09:08) (69 - 92)  BP: 119/67 (25 Feb 2018 09:08) (100/50 - 119/67)  RR: 18 (25 Feb 2018 09:08) (18 - 20)  SpO2: 93% (25 Feb 2018 09:08) (92% - 95%)    PHYSICAL EXAM:    GENERAL: Elderly female looking comfortable   HEENT: PERRL, +EOMI  NECK: soft, Supple, No JVD,   CHEST/LUNG: decrease air entry bilaterally; minimal b/l crackles, No wheezing  HEART: S1S2+, Regular rate and rhythm; No murmurs  ABDOMEN: Soft, Nontender, Nondistended; Bowel sounds present  EXTREMITIES:  1+ Peripheral Pulses, No edema  SKIN: No rashes or lesions  NEURO: AAOX3, no focal deficits, no motor r sensory loss  PSYCH: normal mood      LABS:                  I&O's Summary    24 Feb 2018 07:01  -  25 Feb 2018 07:00  --------------------------------------------------------  IN: 240 mL / OUT: 0 mL / NET: 240 mL        MEDICATIONS  (STANDING):  ALBUTerol    90 MICROgram(s) HFA Inhaler 1 Puff(s) Inhalation every 4 hours  aspirin enteric coated 81 milliGRAM(s) Oral daily  cefTRIAXone   IVPB 1 Gram(s) IV Intermittent every 24 hours  cefTRIAXone   IVPB      enoxaparin Injectable 40 milliGRAM(s) SubCutaneous daily  furosemide    Tablet 20 milliGRAM(s) Oral daily  metoprolol     tartrate 25 milliGRAM(s) Oral daily  nystatin Powder 1 Application(s) Topical two times a day  potassium chloride    Tablet ER 10 milliEquivalent(s) Oral daily  saccharomyces boulardii 250 milliGRAM(s) Oral two times a day  sodium chloride 0.9% lock flush 3 milliLiter(s) IV Push every 8 hours  tiotropium 18 MICROgram(s) Capsule 1 Capsule(s) Inhalation daily  valsartan 160 milliGRAM(s) Oral daily    MEDICATIONS  (PRN):  acetaminophen   Tablet 650 milliGRAM(s) Oral every 6 hours PRN For Temp greater than 38 C (100.4 F), pain and headache  ALBUTerol/ipratropium for Nebulization 3 milliLiter(s) Nebulizer every 6 hours PRN Shortness of Breath and/or Wheezing  guaiFENesin    Syrup 100 milliGRAM(s) Oral every 6 hours PRN Cough

## 2018-02-25 NOTE — PROGRESS NOTE ADULT - PROBLEM SELECTOR PROBLEM 2
Pneumonia of right lower lobe due to infectious organism
R/O Sepsis, due to unspecified organism

## 2018-02-25 NOTE — PROGRESS NOTE ADULT - PROVIDER SPECIALTY LIST ADULT
Hospitalist
Pulmonology
Hospitalist
Hospitalist

## 2018-02-25 NOTE — PROGRESS NOTE ADULT - PROBLEM SELECTOR PROBLEM 1
R/O Acute respiratory failure with hypoxia
Pneumonia of right lower lobe due to infectious organism
R/O Acute respiratory failure with hypoxia

## 2018-02-25 NOTE — PROGRESS NOTE ADULT - PROBLEM SELECTOR PLAN 1
Patient require supplemental oxygen, not on oxygen at home, it could be due to pneumonia, no PE on ct done on 0/16, proBNP is better then before, cycle trops came negative, Echo done result pending, she has been given small dose of Lasix, will d/c as it does not looks like related to heart failure, pulmonary consult appreciated, as per Pulmonary she is well know to their service, she has Hx of emphysema, she might need home O2, will continue with duo nebs and antibiotics for pneumonia for now, will monitor, her supplemental oxygen requirement is down to 3 liters now, will continue to wean her off. Patient require supplemental oxygen, not on oxygen at home, it could be due to pneumonia, no PE on ct done on 0/16, proBNP is better then before, cycle trops came negative, Echo done result pending, she has been given small dose of Lasix, will d/c as it does not looks like related to heart failure, pulmonary consult appreciated, as per Pulmonary she is well know to their service, she has Hx of emphysema, she might need home O2, will continue with duo nebs and antibiotics for pneumonia for now, will monitor, her supplemental oxygen requirement is down to 3 liters now, will continue to wean her off, will get home O2 eval.

## 2018-02-25 NOTE — PROGRESS NOTE ADULT - PROBLEM SELECTOR PROBLEM 3
R/O Sepsis, due to unspecified organism
R/O Sepsis, due to unspecified organism
Essential hypertension
R/O Sepsis, due to unspecified organism

## 2018-02-25 NOTE — PROGRESS NOTE ADULT - PROBLEM SELECTOR PLAN 2
Patient with no recent hospitalizations, No hx of aspiration or dysphagia. Cont with ceftrixone, she has completed course of azithromycin, checked lactate  WNL, Normotensive, non-toxic, RVP neg, Blood cultures has been negative, Supplemental 02, nebs, spirometer, OOB/Ambulate, will slowly wean off from the supplemental oxygen, she is encouraged to use incentive spirometry, she had CT chest done showed no PE, will continue antibiotics to finish 10days course, will provide pain meds for pleuritic chest pain and incentive spirometry, repeat CXR showed Residual right lung base  focal airspace consolidation, will continue with incentive spirometry.

## 2018-02-26 ENCOUNTER — TRANSCRIPTION ENCOUNTER (OUTPATIENT)
Age: 83
End: 2018-02-26

## 2018-02-26 VITALS
TEMPERATURE: 98 F | RESPIRATION RATE: 20 BRPM | HEART RATE: 84 BPM | OXYGEN SATURATION: 95 % | SYSTOLIC BLOOD PRESSURE: 97 MMHG | DIASTOLIC BLOOD PRESSURE: 64 MMHG

## 2018-02-26 PROCEDURE — 99239 HOSP IP/OBS DSCHRG MGMT >30: CPT

## 2018-02-26 PROCEDURE — 97116 GAIT TRAINING THERAPY: CPT

## 2018-02-26 PROCEDURE — 87633 RESP VIRUS 12-25 TARGETS: CPT

## 2018-02-26 PROCEDURE — 87486 CHLMYD PNEUM DNA AMP PROBE: CPT

## 2018-02-26 PROCEDURE — 83880 ASSAY OF NATRIURETIC PEPTIDE: CPT

## 2018-02-26 PROCEDURE — 80048 BASIC METABOLIC PNL TOTAL CA: CPT

## 2018-02-26 PROCEDURE — 93005 ELECTROCARDIOGRAM TRACING: CPT

## 2018-02-26 PROCEDURE — 87086 URINE CULTURE/COLONY COUNT: CPT

## 2018-02-26 PROCEDURE — 83605 ASSAY OF LACTIC ACID: CPT

## 2018-02-26 PROCEDURE — 99285 EMERGENCY DEPT VISIT HI MDM: CPT | Mod: 25

## 2018-02-26 PROCEDURE — 85027 COMPLETE CBC AUTOMATED: CPT

## 2018-02-26 PROCEDURE — 71046 X-RAY EXAM CHEST 2 VIEWS: CPT

## 2018-02-26 PROCEDURE — 81001 URINALYSIS AUTO W/SCOPE: CPT

## 2018-02-26 PROCEDURE — 87581 M.PNEUMON DNA AMP PROBE: CPT

## 2018-02-26 PROCEDURE — 71045 X-RAY EXAM CHEST 1 VIEW: CPT

## 2018-02-26 PROCEDURE — 97163 PT EVAL HIGH COMPLEX 45 MIN: CPT

## 2018-02-26 PROCEDURE — 87798 DETECT AGENT NOS DNA AMP: CPT

## 2018-02-26 PROCEDURE — 87040 BLOOD CULTURE FOR BACTERIA: CPT

## 2018-02-26 PROCEDURE — 85610 PROTHROMBIN TIME: CPT

## 2018-02-26 PROCEDURE — 84484 ASSAY OF TROPONIN QUANT: CPT

## 2018-02-26 PROCEDURE — 93306 TTE W/DOPPLER COMPLETE: CPT

## 2018-02-26 PROCEDURE — 80053 COMPREHEN METABOLIC PANEL: CPT

## 2018-02-26 PROCEDURE — 87186 SC STD MICRODIL/AGAR DIL: CPT

## 2018-02-26 PROCEDURE — 94640 AIRWAY INHALATION TREATMENT: CPT

## 2018-02-26 PROCEDURE — 36415 COLL VENOUS BLD VENIPUNCTURE: CPT

## 2018-02-26 PROCEDURE — 97530 THERAPEUTIC ACTIVITIES: CPT

## 2018-02-26 PROCEDURE — 82962 GLUCOSE BLOOD TEST: CPT

## 2018-02-26 RX ORDER — TIOTROPIUM BROMIDE 18 UG/1
1 CAPSULE ORAL; RESPIRATORY (INHALATION)
Qty: 0 | Refills: 0 | DISCHARGE
Start: 2018-02-26

## 2018-02-26 RX ORDER — METOPROLOL TARTRATE 50 MG
25 TABLET ORAL DAILY
Qty: 0 | Refills: 0 | Status: DISCONTINUED | OUTPATIENT
Start: 2018-02-26 | End: 2018-02-26

## 2018-02-26 RX ORDER — ALBUTEROL 90 UG/1
1 AEROSOL, METERED ORAL
Qty: 0 | Refills: 0 | DISCHARGE
Start: 2018-02-26

## 2018-02-26 RX ORDER — FUROSEMIDE 40 MG
20 TABLET ORAL DAILY
Qty: 0 | Refills: 0 | Status: DISCONTINUED | OUTPATIENT
Start: 2018-02-26 | End: 2018-02-26

## 2018-02-26 RX ORDER — IPRATROPIUM/ALBUTEROL SULFATE 18-103MCG
3 AEROSOL WITH ADAPTER (GRAM) INHALATION
Qty: 0 | Refills: 0 | DISCHARGE
Start: 2018-02-26

## 2018-02-26 RX ORDER — VALSARTAN 80 MG/1
160 TABLET ORAL DAILY
Qty: 0 | Refills: 0 | Status: DISCONTINUED | OUTPATIENT
Start: 2018-02-26 | End: 2018-02-26

## 2018-02-26 RX ADMIN — NYSTATIN CREAM 1 APPLICATION(S): 100000 CREAM TOPICAL at 06:20

## 2018-02-26 RX ADMIN — SODIUM CHLORIDE 3 MILLILITER(S): 9 INJECTION INTRAMUSCULAR; INTRAVENOUS; SUBCUTANEOUS at 06:23

## 2018-02-26 RX ADMIN — ENOXAPARIN SODIUM 40 MILLIGRAM(S): 100 INJECTION SUBCUTANEOUS at 14:18

## 2018-02-26 RX ADMIN — Medication 10 MILLIEQUIVALENT(S): at 14:19

## 2018-02-26 RX ADMIN — Medication 250 MILLIGRAM(S): at 17:40

## 2018-02-26 RX ADMIN — SODIUM CHLORIDE 3 MILLILITER(S): 9 INJECTION INTRAMUSCULAR; INTRAVENOUS; SUBCUTANEOUS at 14:19

## 2018-02-26 RX ADMIN — Medication 81 MILLIGRAM(S): at 14:18

## 2018-02-26 RX ADMIN — NYSTATIN CREAM 1 APPLICATION(S): 100000 CREAM TOPICAL at 17:40

## 2018-02-26 RX ADMIN — Medication 250 MILLIGRAM(S): at 06:19

## 2018-02-26 NOTE — DISCHARGE NOTE ADULT - PLAN OF CARE
due to pna and copd is requiring O2 NC at this time  will likely require home O2 post YOAN due to pna: resolved s/p completion of abx CAP likley gram negative organism: resolved s/p abx completion stable continue copd meds continue cardiac meds

## 2018-02-26 NOTE — DISCHARGE NOTE ADULT - CARE PROVIDER_API CALL
Narciso Segundo), Family Medicine  [address] [place] [address]  Phone: [phone]  Fax: [phone]

## 2018-02-26 NOTE — DISCHARGE NOTE ADULT - HOSPITAL COURSE
89 y/o female with  admitted with CAP, early sepsis, hypoxia, form Pneumonia, hx of HTN, PPM, UTI, for her Acute respiratory failure with hypoxia, Patient require supplemental oxygen, not on oxygen at home, it could be due to pneumonia, no PE on ct, proBNP is better then before, cycle trops came negative, Echo done result showed  Left ventricular ejection fraction, by visual estimation, is 50 to 55%,  Low normal global left ventricular systolic function, Moderate sized plaque involving the ascending aorta, she has been given small dose of Lasix, later on d/c as it does not looks like related to heart failure, pulmonary consult appreciated, as per Pulmonary she is well know to their service, she has Hx of emphysema, she might need home O2, continued with duo nebs and antibiotics for pneumonia, her supplemental oxygen requirement is down to 3 liters form, try continue to wean her off.     For her Pneumonia of right lower lobe, Patient with no recent hospitalizations, No hx of aspiration or dysphagia, Cont with ceftriaxone she has completed course of azithromycin, checked lactate  WNL, Normotensive, non-toxic, RVP neg, Blood cultures has been negative, Supplemental 02, nebs, spirometer, she had CT chest done showed no PE, antibiotics to finish 10 days course which is today 2/26/18, pain meds for pleuritic chest pain and incentive spirometry, repeat CXR showed Residual right lung base  focal airspace consolidation, sepsis is resolved, WBCs are down.     Her urine cx growing ESBL Ecoli, patient has no symptoms of UTI, her WBC count came down, looks like contamination, repeated UA looks ok with WBCs in urine 0-3, discussed with ID verbally no need for further antibiotics.        PHYSICAL EXAM:  Vital Signs Last 24 Hrs  T(C): 36.4 (26 Feb 2018 08:10), Max: 37 (25 Feb 2018 16:07)  T(F): 97.5 (26 Feb 2018 08:10), Max: 98.6 (25 Feb 2018 16:07)  HR: 81 (26 Feb 2018 08:10) (69 - 85)  BP: 94/58 (26 Feb 2018 08:10) (94/58 - 104/60)  BP(mean): --  RR: 18 (26 Feb 2018 08:10) (18 - 20)  SpO2: 94% (26 Feb 2018 08:10) (91% - 96%)    GENERAL: NAD, well-groomed, well-developed  EYES: EOMI, PERRLA, conjunctiva and sclera clear  CHEST/LUNG: decreased BS bilaterally; No rales, rhonchi, wheezing  HEART: Regular rate and rhythm; No murmurs, rubs, or gallops  ABDOMEN: Soft, Nontender, Nondistended; Bowel sounds present  EXTREMITIES:  2+ Peripheral Pulses, No clubbing, cyanosis, or edema    Total time spent for discharge: 35 minutes 89 y/o female with  admitted with CAP, early sepsis, hypoxia, form Pneumonia, hx of HTN, PPM, UTI, for her Acute respiratory failure with hypoxia, Patient require supplemental oxygen, not on oxygen at home, it could be due to pneumonia, no PE on ct, proBNP is better then before, cycle trops came negative, Echo done result showed  Left ventricular ejection fraction, by visual estimation, is 50 to 55%,  Low normal global left ventricular systolic function, Moderate sized plaque involving the ascending aorta, she has been given small dose of Lasix, later on d/c as it does not looks like related to heart failure, pulmonary consult appreciated, as per Pulmonary she is well know to their service, she has Hx of emphysema, she might need home O2, continued with duo nebs and antibiotics for pneumonia, her supplemental oxygen requirement is down to 3 liters form, try continue to wean her off.     For her Pneumonia of right lower lobe, Patient with no recent hospitalizations, No hx of aspiration or dysphagia, Cont with ceftriaxone she has completed course of azithromycin, checked lactate  WNL, Normotensive, non-toxic, RVP neg, Blood cultures has been negative, Supplemental 02, nebs, spirometer, she had CT chest done showed no PE, antibiotics to finish 10 days course which is today 2/26/18, pain meds for pleuritic chest pain and incentive spirometry, repeat CXR showed Residual right lung base  focal airspace consolidation, sepsis is resolved, WBCs are down.     Her urine cx growing ESBL Ecoli, patient has no symptoms of UTI, her WBC count came down, looks like contamination, repeated UA looks ok with WBCs in urine 0-3, discussed with ID verbally no need for further antibiotics.    PHYSICAL EXAM:  Vital Signs Last 24 Hrs  T(C): 36.4 (26 Feb 2018 08:10), Max: 37 (25 Feb 2018 16:07)  T(F): 97.5 (26 Feb 2018 08:10), Max: 98.6 (25 Feb 2018 16:07)  HR: 81 (26 Feb 2018 08:10) (69 - 85)  BP: 94/58 (26 Feb 2018 08:10) (94/58 - 104/60)  BP(mean): --  RR: 18 (26 Feb 2018 08:10) (18 - 20)  SpO2: 94% (26 Feb 2018 08:10) (91% - 96%)    GENERAL: NAD, well-groomed, well-developed  EYES: EOMI, PERRLA, conjunctiva and sclera clear  CHEST/LUNG: decreased BS bilaterally; No rales, rhonchi, wheezing  HEART: Regular rate and rhythm; No murmurs, rubs, or gallops  ABDOMEN: Soft, Nontender, Nondistended; Bowel sounds present  EXTREMITIES:  2+ Peripheral Pulses, No clubbing, cyanosis, or edema    Total time spent for discharge: 35 minutes

## 2018-02-26 NOTE — DISCHARGE NOTE ADULT - OTHER SIGNIFICANT FINDINGS
CXR:  Findings:  There is an infiltrate in the left lower lobe. Less pronounced infiltrate   in the right lower lobe. These findings are unchanged. The heart and   mediastinum unremarkable. No definite evidence of a pleural effusion.  Impression:  Bilateral lower lobe infiltrates, left larger than right. Stable exam   without significant change since the previous study..  LAURA HEATH M.D., ATTENDING RADIOLOGIST  This document has been electronically signed. Feb 19 2018  1:53PM    CT chest Angio:  INTERPRETATION:  CT CHEST WITH IV CONTRAST:  Date and time of exam: 2/16/2018 5:45 PM.  COMPARISON EXAMINATION:   No prior exam.  FINDINGS:  No evidence of pulmonary emboli.  No evidence of mediastinal or hilar lymphadenopathy. No evidence of   pleural or pericardial effusion. No evidence of axillary adenopathy.  Emphysematous changes diffusely. Mild atelectasis in the left lower lobe   and lingula. There is a infiltrate posteriorly at the right lung base.  Degenerative changes in the spine..      IMPRESSION:      No evidence of pulmonary embolus.  Right lower lobe infiltrate.  Atelectasis in the lingula and left lower lobe.  Emphysematous changes..  LAURA HEATH M.D., ATTENDING RADIOLOGIST  This document has been electronically signed. Feb 16 2018  6:32PM    2Decho:  PHYSICIAN INTERPRETATION:  Left Ventricle: The left ventricular internal cavity size is normal.  Global LV systolic function was low normal. Left ventricular ejection   fraction, by visual estimation, is 50 to 55%. Spectral Doppler shows   impaired relaxation pattern of left ventricular myocardial filling (Grade   I diastolic dysfunction). Assynchronous contraction, but overall LVEF   appears to be within normal limits.  Right Ventricle: The right ventricular size is mildly enlarged. RV   systolic function is normal.  Left Atrium: The left atrium is normal in size.  Right Atrium: The right atrium is normal in size.  Pericardium: There is no evidence of pericardial effusion. There is a   significant pericardial fat pad present.  Mitral Valve: Thickening of the anterior and posterior mitral valve   leaflets. There is moderate mitral annular calcification. Trace mitral   valve regurgitation is seen.  Tricuspid Valve: Mild tricuspid regurgitation is visualized.  Aortic Valve: The aortic valve is trileaflet. Sclerotic aortic valve with   decreased opening. Peak transaortic gradient equals 13.1 mmHg, mean   transaortic gradient equals 7.3 mmHg, No evidence of aortic valve   regurgitation is seen. Sclerotic aortic valve with decreased opening.  Pulmonic Valve: The pulmonic valve was not well visualized.  Aorta: The aortic root is normal in size and structure. Simple atheroma   seen in the aortic arch.  Pulmonary Artery: The pulmonary artery is mildly dilated.  Venous: The inferior vena cava is not well visualized.       Summary:   1. Technically difficult study.   2. Normal left ventricular internal cavity size.   3. Assynchronous contraction, but overall LVEF appears to be within   normal limits.   4. Left ventricular ejection fraction, by visual estimation, is 50 to   55%.   5. Low normal global left ventricular systolic function.   6. Spectral Doppler shows impaired relaxation pattern of left   ventricular myocardial filling (Grade I diastolic dysfunction).   7. Moderate mitral annular calcification.   8. Thickening of the anterior and posterior mitral valve leaflets.   9. Trace mitral valve regurgitation.  10. Sclerotic aortic valve with decreased opening.  11. Moderate sized plaque involving the ascending aorta.  12. Mildly dilated pulmonary artery.  13. There is no evidence of pericardial effusion.  14. There is a significant pericardial fat pad present.    MD Beena Electronically signed on 2/22/2018 at 8:54:48 PM   HARINI SHANKS M.D.  This document has been electronically signed. Feb 22 2018 10:14AM

## 2018-02-26 NOTE — DISCHARGE NOTE ADULT - CARE PLAN
Principal Discharge DX:	Acute respiratory failure with hypoxia  Goal:	due to pna and copd  Assessment and plan of treatment:	is requiring O2 NC at this time  will likely require home O2 post YOAN  Secondary Diagnosis:	Sepsis, due to unspecified organism  Goal:	due to pna: resolved  Assessment and plan of treatment:	s/p completion of abx  Secondary Diagnosis:	Pneumonia of right lower lobe due to infectious organism  Goal:	CAP likley gram negative organism: resolved  Assessment and plan of treatment:	s/p abx completion  Secondary Diagnosis:	COPD exacerbation  Goal:	stable  Assessment and plan of treatment:	continue copd meds  Secondary Diagnosis:	CAD (coronary artery disease)  Goal:	stable  Assessment and plan of treatment:	continue cardiac meds  Secondary Diagnosis:	Essential hypertension

## 2018-02-26 NOTE — DISCHARGE NOTE ADULT - PATIENT PORTAL LINK FT
You can access the [facility] Patient Portal, offered by [facility], by registering with the following website: [email or web address]

## 2018-02-26 NOTE — DISCHARGE NOTE ADULT - MEDICATION SUMMARY - MEDICATIONS TO TAKE
I will START or STAY ON the medications listed below when I get home from the hospital:    Ecotrin  -- 81  orally  -- Indication: For CAD (coronary artery disease)    valsartan 160 mg oral capsule  -- Indication: For Htn    Lopressor  -- 25 milligram(s) orally  -- Indication: For Htn    tiotropium 18 mcg inhalation capsule  -- 1 cap(s) inhaled once a day  -- Indication: For Copd    ipratropium-albuterol 0.5 mg-2.5 mg/3 mLinhalation solution  -- 3 milliliter(s) inhaled every 6 hours, As needed, Shortness of Breath and/or Wheezing  -- Indication: For Copd    albuterol 90 mcg/inh inhalation aerosol  -- 1 puff(s) inhaled every 4 hours  -- Indication: For Copd    Lasix 20 mg oral tablet  -- 1 tab(s) by mouth once a day  -- Indication: For Htn    KCl-20 oral liquid  -- 15 milliliter(s) by mouth 2 times a day  -- Indication: For Potassium

## 2018-02-26 NOTE — DISCHARGE NOTE ADULT - SECONDARY DIAGNOSIS.
Sepsis, due to unspecified organism Pneumonia of right lower lobe due to infectious organism COPD exacerbation CAD (coronary artery disease) Essential hypertension

## 2018-02-27 RX ORDER — AMLODIPINE BESYLATE 2.5 MG/1
1 TABLET ORAL
Qty: 0 | Refills: 0 | COMMUNITY

## 2018-03-09 NOTE — CDI QUERY NOTE - NSCDIOTHERTXTBX_GEN_ALL_CORE_HH
pt. admitted with /80, P 79, R 18, afebrile, WBC 11.4. +pneumonia.  Tx with IV abx.  Sepsis/ sepsis resolved  documented in chart.  Please clarify the status of sepsis.

## 2018-03-22 PROBLEM — I25.10 ATHEROSCLEROTIC HEART DISEASE OF NATIVE CORONARY ARTERY WITHOUT ANGINA PECTORIS: Chronic | Status: ACTIVE | Noted: 2018-02-16

## 2018-03-22 PROBLEM — I10 ESSENTIAL (PRIMARY) HYPERTENSION: Chronic | Status: ACTIVE | Noted: 2018-02-16

## 2018-03-27 ENCOUNTER — APPOINTMENT (OUTPATIENT)
Dept: PULMONOLOGY | Facility: CLINIC | Age: 83
End: 2018-03-27
Payer: MEDICARE

## 2018-03-27 VITALS — DIASTOLIC BLOOD PRESSURE: 60 MMHG | BODY MASS INDEX: 36.16 KG/M2 | WEIGHT: 173 LBS | SYSTOLIC BLOOD PRESSURE: 98 MMHG

## 2018-03-27 VITALS — OXYGEN SATURATION: 98 % | HEART RATE: 88 BPM

## 2018-03-27 PROCEDURE — 99214 OFFICE O/P EST MOD 30 MIN: CPT

## 2018-05-22 ENCOUNTER — OTHER (OUTPATIENT)
Age: 83
End: 2018-05-22

## 2018-06-02 NOTE — H&P ADULT - RESPIRATORY AND THORAX
Jose Alvarez 
 
 
 [address] [place] [address] 
[phone] Patient: Loraine Espinoza MRN: [id number] NCI:[birth year] About your hospitalization You were admitted on:  June 2, 2018 You last received care in the:  [address] [place] [address] You were discharged on:  June 4, 2018 Why you were hospitalized Your primary diagnosis was:  Pneumonia Your diagnoses also included:  Gerd (Gastroesophageal Reflux Disease), Atrial Fibrillation (Hcc), Hypothyroidism, Sepsis (Hcc) Follow-up Information Follow up With Details Comments Contact Info Brijesh De Leon MD On 6/8/2018 10:45am [place] [address] [address] [facility] [id number] [phone] Discharge Orders None A check poonam indicates which time of day the medication should be taken. My Medications START taking these medications Instructions Each Dose to Equal  
 Morning Noon Evening Bedtime  
 levoFLOXacin 750 mg tablet Commonly known as:  Willam Blue Take 1 Tab by mouth daily for 5 days. 750 mg CHANGE how you take these medications Instructions Each Dose to Equal  
 Morning Noon Evening Bedtime  
 apixaban 5 mg tablet Commonly known as:  Oswald Dean What changed:   
- medication strength 
- how much to take Take 1 Tab by mouth two (2) times a day. 5 mg  
    
   
   
   
  
  
 hydrocortisone 2.5 % topical cream  
Commonly known as:  HYTONE What changed:  Another medication with the same name was removed. Continue taking this medication, and follow the directions you see here. levothyroxine 175 mcg tablet Commonly known as:  SYNTHROID What changed:  Another medication with the same name was removed. Continue taking this medication, and follow the directions you see here. Take 175 mcg by mouth Daily (before breakfast). 175 mcg PICATO 0.015 % Gel Generic drug:  ingenol mebutate What changed:  Another medication with the same name was removed. Continue taking this medication, and follow the directions you see here. CONTINUE taking these medications Instructions Each Dose to Equal  
 Morning Noon Evening Bedtime  
 acitretin 25 mg capsule TK 1 C PO QPM  
     
   
   
   
  
 ALPHAGAN P 0.15 % ophthalmic solution Generic drug:  brimonidine CENTRUM SILVER Tab tablet Generic drug:  multivitamins-minerals-lutein Take  by mouth.  
     
   
   
   
  
 clobetasol 0.05 % Sham  
   
      
   
   
   
  
 COLCRYS 0.6 mg tablet Generic drug:  colchicine Take As Needed. COMBIVENT RESPIMAT  mcg/actuation inhaler Generic drug:  ipratropium-albuterol Take As Needed. cyanocobalamin 1,000 mcg tablet  
   
 1,000 mcg. 1000 mcg  
    
   
   
   
  
 desonide 0.05 % cream  
Commonly known as:  Reyes Gunther Take As Needed. dilTIAZem  mg ER capsule Commonly known as:  CARDIZEM CD  
   
 180 mg.  
 180 mg  
    
   
   
   
  
 esomeprazole 40 mg capsule Commonly known as:  NEXIUM  
   
 40 mg.  
 40 mg  
    
   
   
   
  
 furosemide 40 mg tablet Commonly known as:  LASIX  
   
      
   
   
   
  
 gabapentin 600 mg tablet Commonly known as:  NEURONTIN Take 600 mg by mouth three (3) times daily. 600 mg  
    
   
   
   
  
 hydrOXYzine HCl 10 mg tablet Commonly known as:  ATARAX Take As Needed. loratadine 10 mg tablet Commonly known as:  CLARITIN  
   
 10 mg.  
 10 mg  
    
   
   
   
  
 LUMIGAN 0.03 % ophthalmic drops Generic drug:  bimatoprost  
   
 Administer 1 drop to both eyes every evening.   
 1 Drop  
    
   
   
   
  
 metroNIDAZOLE 0.75 % topical cream  
Commonly known as:  Slim Kelley  
   
 APPLY TO FACE BID  
     
   
   
   
  
 PROAIR HFA 90 mcg/actuation inhaler Generic drug:  albuterol Take  inhaled by mouth. tamsulosin 0.4 mg capsule Commonly known as:  FLOMAX Take 1 Cap by mouth daily (after dinner). 0.4 mg  
    
   
   
   
  
  
 traMADol 50 mg tablet Commonly known as:  ULTRAM  
   
 Take 1 tablet by mouth every six (6) hours as needed for Pain. 50 mg  
    
   
   
   
  
 traZODone 50 mg tablet Commonly known as:  Eamon Spatz Take 50 mg by mouth nightly as needed. 50 mg  
    
   
   
   
  
 VITAMIN B-6 PO Take  by mouth. Where to Get Your Medications Information on where to get these meds will be given to you by the nurse or doctor. ! Ask your nurse or doctor about these medications  
  apixaban 5 mg tablet  
 levoFLOXacin 750 mg tablet Opioid Education Prescription Opioids: What You Need to Know: 
 
 
 
F-face looks uneven A-arms unable to move or move unevenly S-speech slurred or non-existent T-time-call 911 as soon as signs and symptoms begin-DO NOT go Back to bed or wait to see if you get better-TIME IS BRAIN. Warning Signs of HEART ATTACK Call 911 if you have these symptoms: ? Chest discomfort. Most heart attacks involve discomfort in the center of the chest that lasts more than a few minutes, or that goes away and comes back. It can feel like uncomfortable pressure, squeezing, fullness, or pain. ? Discomfort in other areas of the upper body. Symptoms can include pain or discomfort in one or both arms, the back, neck, jaw, or stomach. ? Shortness of breath with or without chest discomfort. ? Other signs may include breaking out in a cold sweat, nausea, or lightheadedness. Don't wait more than five minutes to call [address]! Fast action can save your life. Calling 911 is almost always the fastest way to get lifesaving treatment. Emergency Medical Services staff can begin treatment when they arrive  up to an hour sooner than if someone gets to the hospital by car. The discharge information has been reviewed with the patient. The patient verbalized understanding. Discharge medications reviewed with the patient and appropriate educational materials and side effects teaching were provided. ___________________________________________________________________________________________________________________________________ Introducing [facility]! Dear Demarucs Keller: Thank you for requesting a [facility] account. Our records indicate that you already have an active [facility] account. You can access your account anytime at [email or web address] Did you know that you can access your hospital and ER discharge instructions at any time in [facility]? You can also review all of your test results from your hospital stay or ER visit. Additional Information If you have questions, please visit the Frequently Asked Questions section of the [facility] website at [email or web address] Remember, [facility] is NOT to be used for urgent needs. For medical emergencies, dial 911. Now available from your iPhone and Android! Introducing Harish Lomas As a [facility] patient, I wanted to make you aware of our electronic visit tool called [facility]. [employer] [facility] allows you to connect within minutes with a medical provider 24 hours a day, seven days a week via a mobile device or tablet or logging into a secure website from your computer. You can access [facility]ish Lomas from anywhere in the [place]. A virtual visit might be right for you when you have a simple condition and feel like you just dont want to get out of bed, or cant get away from work for an appointment, when your regular [facility] provider is not available (evenings, weekends or holidays), or when youre out of town and need minor care. Electronic visits cost only $49 and if the [employer] [facility] provider determines a prescription is needed to treat your condition, one can be electronically transmitted to a nearby pharmacy*. Please take a moment to enroll today if you have not already done so. The enrollment process is free and takes just a few minutes. To enroll, please download the [email or web address] josette to your tablet or phone, or visit [email or web address] to enroll on your computer. And, as an [address] [place] [address] patient with a [employer] account, the results of your visits will be scanned into your electronic medical record and your primary care provider will be able to view the scanned results. We urge you to continue to see your regular [facility] provider for your ongoing medical care. And while your primary care provider may not be the one available when you seek a [facility]ish Lomas virtual visit, the peace of mind you get from getting a real diagnosis real time can be priceless. For more information on [facility]omas, view our Frequently Asked Questions (FAQs) at [email or web address]. Sincerely, 
 
Keily Velázquez MD 
Chief Medical Officer Sylvester8 Monica Pina *:  certain medications cannot be prescribed via [facility] Lomas Unresulted Labs-Please follow up with your PCP about these lab tests Order Current Status CULTURE, BLOOD Preliminary result CULTURE, BLOOD Preliminary result Providers Seen During Your Hospitalization Provider Specialty Primary office phone Luis Agrawal DO Emergency Medicine [phone] Augusto Forte MD Emergency Medicine [phone] Kendal Angeles DO Internal Medicine [phone] Petty Patiño MD Internal Medicine [phone] Your Primary Care Physician (PCP) Primary Care Physician Office Phone Office Fax [address]85 [address]  [phone] You are allergic to the following Allergen Reactions Adhesive Other (comments) Skin irritation Darvon (Propoxyphene) Hives Myalgia Other (comments) Joint pain Penicillins Rash Itching Recent Documentation Height Weight BMI Smoking Status 1.727 m 81.6 kg 27.37 kg/m2 Former Smoker Emergency Contacts Name Discharge Info Relation Home Work Mobile [place] Hernández DISCHARGE CAREGIVER [3] Spouse [3] [phone] Patient Belongings The following personal items are in your possession at time of discharge: 
  Dental Appliances: None  Visual Aid: Glasses, At bedside, With patient      Home Medications: None   Jewelry: None  Clothing: None    Other Valuables: None Please provide this summary of care documentation to your next provider. Signatures-by signing, you are acknowledging that this After Visit Summary has been reviewed with you and you have received a copy. Patient Signature:  ____________________________________________________________ Date:  ____________________________________________________________  
  
Gareth Vazquez Provider Signature:  ____________________________________________________________ Date:  ____________________________________________________________ details…

## 2018-10-26 ENCOUNTER — INBOUND DOCUMENT (OUTPATIENT)
Age: 83
End: 2018-10-26

## 2018-11-20 ENCOUNTER — APPOINTMENT (OUTPATIENT)
Dept: PULMONOLOGY | Facility: CLINIC | Age: 83
End: 2018-11-20
Payer: MEDICARE

## 2018-11-20 VITALS
OXYGEN SATURATION: 91 % | WEIGHT: 180 LBS | HEART RATE: 96 BPM | SYSTOLIC BLOOD PRESSURE: 100 MMHG | DIASTOLIC BLOOD PRESSURE: 80 MMHG | BODY MASS INDEX: 37.62 KG/M2

## 2018-11-20 PROCEDURE — 99214 OFFICE O/P EST MOD 30 MIN: CPT

## 2018-11-20 RX ORDER — POTASSIUM CHLORIDE 750 MG/1
10 TABLET, FILM COATED, EXTENDED RELEASE ORAL
Refills: 0 | Status: DISCONTINUED | COMMUNITY
End: 2018-11-20

## 2018-11-20 RX ORDER — AMLODIPINE BESYLATE 10 MG/1
10 TABLET ORAL
Refills: 0 | Status: DISCONTINUED | COMMUNITY
End: 2018-11-20

## 2020-01-16 NOTE — PHYSICAL THERAPY INITIAL EVALUATION ADULT - PHYSICAL ASSIST/NONPHYSICAL ASSIST: SIT/STAND, REHAB EVAL
Bedside shift change report given to Casandra Russo RN (oncoming nurse) by Luis Armando Dean RN (offgoing nurse). Report included the following information SBAR, Kardex, Procedure Summary, Intake/Output, MAR, Accordion, Recent Results, Med Rec Status and Cardiac Rhythm paced. 1 person assist

## 2021-12-13 ENCOUNTER — INPATIENT (INPATIENT)
Facility: HOSPITAL | Age: 86
LOS: 9 days | Discharge: HOME | DRG: 291 | End: 2021-12-23
Attending: FAMILY MEDICINE | Admitting: STUDENT IN AN ORGANIZED HEALTH CARE EDUCATION/TRAINING PROGRAM
Payer: MEDICARE

## 2021-12-13 VITALS — HEIGHT: 60 IN | WEIGHT: 154.98 LBS

## 2021-12-13 DIAGNOSIS — Z98.89 OTHER SPECIFIED POSTPROCEDURAL STATES: Chronic | ICD-10-CM

## 2021-12-13 DIAGNOSIS — Z95.0 PRESENCE OF CARDIAC PACEMAKER: Chronic | ICD-10-CM

## 2021-12-13 DIAGNOSIS — H26.40 UNSPECIFIED SECONDARY CATARACT: Chronic | ICD-10-CM

## 2021-12-13 DIAGNOSIS — Z45.018 ENCOUNTER FOR ADJUSTMENT AND MANAGEMENT OF OTHER PART OF CARDIAC PACEMAKER: Chronic | ICD-10-CM

## 2021-12-13 DIAGNOSIS — I50.9 HEART FAILURE, UNSPECIFIED: ICD-10-CM

## 2021-12-13 LAB
ALBUMIN SERPL ELPH-MCNC: 3.6 G/DL — SIGNIFICANT CHANGE UP (ref 3.3–5.2)
ALP SERPL-CCNC: 88 U/L — SIGNIFICANT CHANGE UP (ref 40–120)
ALT FLD-CCNC: 12 U/L — SIGNIFICANT CHANGE UP
ANION GAP SERPL CALC-SCNC: 17 MMOL/L — SIGNIFICANT CHANGE UP (ref 5–17)
AST SERPL-CCNC: 25 U/L — SIGNIFICANT CHANGE UP
BASOPHILS # BLD AUTO: 0.03 K/UL — SIGNIFICANT CHANGE UP (ref 0–0.2)
BASOPHILS NFR BLD AUTO: 0.4 % — SIGNIFICANT CHANGE UP (ref 0–2)
BILIRUB SERPL-MCNC: 0.4 MG/DL — SIGNIFICANT CHANGE UP (ref 0.4–2)
BUN SERPL-MCNC: 25.5 MG/DL — HIGH (ref 8–20)
CALCIUM SERPL-MCNC: 9 MG/DL — SIGNIFICANT CHANGE UP (ref 8.6–10.2)
CHLORIDE SERPL-SCNC: 112 MMOL/L — HIGH (ref 98–107)
CO2 SERPL-SCNC: 21 MMOL/L — LOW (ref 22–29)
CREAT SERPL-MCNC: 1.03 MG/DL — SIGNIFICANT CHANGE UP (ref 0.5–1.3)
EOSINOPHIL # BLD AUTO: 0.03 K/UL — SIGNIFICANT CHANGE UP (ref 0–0.5)
EOSINOPHIL NFR BLD AUTO: 0.4 % — SIGNIFICANT CHANGE UP (ref 0–6)
GLUCOSE SERPL-MCNC: 137 MG/DL — HIGH (ref 70–99)
HCT VFR BLD CALC: 36.8 % — SIGNIFICANT CHANGE UP (ref 34.5–45)
HGB BLD-MCNC: 11.7 G/DL — SIGNIFICANT CHANGE UP (ref 11.5–15.5)
IMM GRANULOCYTES NFR BLD AUTO: 0.4 % — SIGNIFICANT CHANGE UP (ref 0–1.5)
LYMPHOCYTES # BLD AUTO: 1.23 K/UL — SIGNIFICANT CHANGE UP (ref 1–3.3)
LYMPHOCYTES # BLD AUTO: 17.2 % — SIGNIFICANT CHANGE UP (ref 13–44)
MAGNESIUM SERPL-MCNC: 2.2 MG/DL — SIGNIFICANT CHANGE UP (ref 1.6–2.6)
MCHC RBC-ENTMCNC: 28.9 PG — SIGNIFICANT CHANGE UP (ref 27–34)
MCHC RBC-ENTMCNC: 31.8 GM/DL — LOW (ref 32–36)
MCV RBC AUTO: 90.9 FL — SIGNIFICANT CHANGE UP (ref 80–100)
MONOCYTES # BLD AUTO: 0.57 K/UL — SIGNIFICANT CHANGE UP (ref 0–0.9)
MONOCYTES NFR BLD AUTO: 8 % — SIGNIFICANT CHANGE UP (ref 2–14)
NEUTROPHILS # BLD AUTO: 5.26 K/UL — SIGNIFICANT CHANGE UP (ref 1.8–7.4)
NEUTROPHILS NFR BLD AUTO: 73.6 % — SIGNIFICANT CHANGE UP (ref 43–77)
NT-PROBNP SERPL-SCNC: 2829 PG/ML — HIGH (ref 0–300)
PLATELET # BLD AUTO: 265 K/UL — SIGNIFICANT CHANGE UP (ref 150–400)
POTASSIUM SERPL-MCNC: 4.3 MMOL/L — SIGNIFICANT CHANGE UP (ref 3.5–5.3)
POTASSIUM SERPL-SCNC: 4.3 MMOL/L — SIGNIFICANT CHANGE UP (ref 3.5–5.3)
PROT SERPL-MCNC: 7.1 G/DL — SIGNIFICANT CHANGE UP (ref 6.6–8.7)
RBC # BLD: 4.05 M/UL — SIGNIFICANT CHANGE UP (ref 3.8–5.2)
RBC # FLD: 13.2 % — SIGNIFICANT CHANGE UP (ref 10.3–14.5)
SODIUM SERPL-SCNC: 150 MMOL/L — HIGH (ref 135–145)
TROPONIN T SERPL-MCNC: <0.01 NG/ML — SIGNIFICANT CHANGE UP (ref 0–0.06)
WBC # BLD: 7.15 K/UL — SIGNIFICANT CHANGE UP (ref 3.8–10.5)
WBC # FLD AUTO: 7.15 K/UL — SIGNIFICANT CHANGE UP (ref 3.8–10.5)

## 2021-12-13 PROCEDURE — 99223 1ST HOSP IP/OBS HIGH 75: CPT

## 2021-12-13 PROCEDURE — 71045 X-RAY EXAM CHEST 1 VIEW: CPT | Mod: 26

## 2021-12-13 PROCEDURE — 99285 EMERGENCY DEPT VISIT HI MDM: CPT

## 2021-12-13 PROCEDURE — 93010 ELECTROCARDIOGRAM REPORT: CPT

## 2021-12-13 RX ORDER — FUROSEMIDE 40 MG
40 TABLET ORAL ONCE
Refills: 0 | Status: COMPLETED | OUTPATIENT
Start: 2021-12-13 | End: 2021-12-13

## 2021-12-13 RX ORDER — ALBUTEROL 90 UG/1
2 AEROSOL, METERED ORAL EVERY 4 HOURS
Refills: 0 | Status: DISCONTINUED | OUTPATIENT
Start: 2021-12-13 | End: 2021-12-20

## 2021-12-13 RX ADMIN — Medication 40 MILLIGRAM(S): at 19:06

## 2021-12-13 RX ADMIN — ALBUTEROL 2 PUFF(S): 90 AEROSOL, METERED ORAL at 19:10

## 2021-12-13 NOTE — ED PROVIDER NOTE - PHYSICAL EXAMINATION
General: well appearing, NAD  Head: NC, AT  EENT: EOMI, no scleral icterus  Cardiac: RRR, systolic blowing murmur at right sternum, no lower extremity edema  Respiratory: bl crackles worst at the left lower lobe. no respiratory distress   Abdomen: suprapubic tenderness, soft, ND, nonperitonitic  MSK/Vascular: full ROM, soft compartments, warm extremities  Neuro: AAOx3, sensation to light touch intact  Psych: calm, cooperative

## 2021-12-13 NOTE — ED PROVIDER NOTE - ATTENDING CONTRIBUTION TO CARE
89yoF; with PMH CHF, HTN, HLD, CAD, s/p PPM, COPD(not normally on O2); now p/w BARRON x3-4 days, progressively worsening, and unable to walk 10 steps without having severe dyspnea. report LE swelling. denies cp/palp. denies f/c/s. denies cough. states she is vaccinated for covid. denies abd pain. denies sn/v/d. denies deisy, pain. denies dysuria.  General:  mild tachypnea.   Head:     NC/AT, EOMI, oral mucosa moist  Neck:     trachea midline  Lungs:    bibasilar crackles, mild tachypnea, no retractions.   CVS:     irregular, no m/g/r  Abd:     +BS, s/nt/nd, no organomegaly  Ext:    2+ radial and pedal pulses, trace pedal edema.   Neuro: AAOx3, no sensory/motor deficits  A/P:  89yoF p/w worsening BARRON  -labs, ekg, xray, cardiac monitor, [place] heart consult.

## 2021-12-13 NOTE — ED PROVIDER NOTE - CLINICAL SUMMARY MEDICAL DECISION MAKING FREE TEXT BOX
89 y f with pmh of chf, cad, htn, gerd, bbb, hld, COPD, dm, presenting for exertional dyspnea for 3 days. cbc, cmp, ekg, trop, mg, bnp, ua, uc, albuteral, furosemide, cxr.

## 2021-12-13 NOTE — ED PROVIDER NOTE - NSICDXPASTMEDICALHX_GEN_ALL_CORE_FT
PAST MEDICAL HISTORY:  BBB (bundle branch block) left    CAD (coronary artery disease)     Carotid artery disease     Cataract     CHF (congestive heart failure)     COPD (chronic obstructive pulmonary disease)     Diabetes mellitus type 2    Dyslipidemia     GERD (gastroesophageal reflux disease)     Hernia     Hypertension     Hypertension     Obesity     Pneumonia

## 2021-12-13 NOTE — ED ADULT NURSE REASSESSMENT NOTE - NS ED NURSE REASSESS COMMENT FT1
Patient yelling at staff walking by. Patient states she is wet. Patient has primafit in place with urine output. Disposable salma and brief placed under patient. Assured patient urine is going through tubing into canister. Patient repositioned in bed. Will continue to monitor.

## 2021-12-13 NOTE — ED PROVIDER NOTE - OBJECTIVE STATEMENT
89 y f with pmh of chf, cad, htn, gerd, bbb, hld, COPD, dm, presenting for exertional dyspnea for 3 days. Is taking her furosemide. Denying cp, sob at rest, cough, f/c, n/v, new back/arm/neck pain, abdominal pain, dysuria, frequency of urination.

## 2021-12-13 NOTE — ED PROVIDER NOTE - NS ED ROS FT
Constitutional: no fever, no chills  Head: NC, AT   Eyes: no redness   ENMT: no nasal congestion/drainage, no sore throat   CV: no chest pain, no edema  Resp: no cough, + exertional dyspnea  GI: no abdominal pain, no nausea, no vomiting, no diarrhea  : no dysuria, no hematuria   Skin: no lesions, no rashes   Neuro: no LOC, no headache, no sensory deficits, no weakness

## 2021-12-13 NOTE — ED PROVIDER NOTE - NSICDXPASTSURGICALHX_GEN_ALL_CORE_FT
PAST SURGICAL HISTORY:  Adjustment and management of cardiac pacemaker     After cataract, right eye 3/31/2015    Artificial cardiac pacemaker 8/2014    History of tonsillectomy

## 2021-12-14 LAB
ANION GAP SERPL CALC-SCNC: 16 MMOL/L — SIGNIFICANT CHANGE UP (ref 5–17)
ANION GAP SERPL CALC-SCNC: 18 MMOL/L — HIGH (ref 5–17)
APPEARANCE UR: CLEAR — SIGNIFICANT CHANGE UP
BACTERIA # UR AUTO: ABNORMAL
BILIRUB UR-MCNC: NEGATIVE — SIGNIFICANT CHANGE UP
BUN SERPL-MCNC: 24.2 MG/DL — HIGH (ref 8–20)
BUN SERPL-MCNC: 38.5 MG/DL — HIGH (ref 8–20)
CALCIUM SERPL-MCNC: 8 MG/DL — LOW (ref 8.6–10.2)
CALCIUM SERPL-MCNC: 8.9 MG/DL — SIGNIFICANT CHANGE UP (ref 8.6–10.2)
CHLORIDE SERPL-SCNC: 104 MMOL/L — SIGNIFICANT CHANGE UP (ref 98–107)
CHLORIDE SERPL-SCNC: 105 MMOL/L — SIGNIFICANT CHANGE UP (ref 98–107)
CK SERPL-CCNC: 35 U/L — SIGNIFICANT CHANGE UP (ref 25–170)
CO2 SERPL-SCNC: 22 MMOL/L — SIGNIFICANT CHANGE UP (ref 22–29)
CO2 SERPL-SCNC: 23 MMOL/L — SIGNIFICANT CHANGE UP (ref 22–29)
COLOR SPEC: YELLOW — SIGNIFICANT CHANGE UP
CREAT SERPL-MCNC: 1.05 MG/DL — SIGNIFICANT CHANGE UP (ref 0.5–1.3)
CREAT SERPL-MCNC: 2.02 MG/DL — HIGH (ref 0.5–1.3)
DIFF PNL FLD: ABNORMAL
EPI CELLS # UR: SIGNIFICANT CHANGE UP
GLUCOSE BLDC GLUCOMTR-MCNC: 175 MG/DL — HIGH (ref 70–99)
GLUCOSE SERPL-MCNC: 140 MG/DL — HIGH (ref 70–99)
GLUCOSE SERPL-MCNC: 168 MG/DL — HIGH (ref 70–99)
GLUCOSE UR QL: NEGATIVE — SIGNIFICANT CHANGE UP
KETONES UR-MCNC: NEGATIVE — SIGNIFICANT CHANGE UP
LEUKOCYTE ESTERASE UR-ACNC: ABNORMAL
MAGNESIUM SERPL-MCNC: 2.2 MG/DL — SIGNIFICANT CHANGE UP (ref 1.6–2.6)
NITRITE UR-MCNC: POSITIVE
PH UR: 6 — SIGNIFICANT CHANGE UP (ref 5–8)
PHOSPHATE SERPL-MCNC: 4.4 MG/DL — SIGNIFICANT CHANGE UP (ref 2.4–4.7)
POTASSIUM SERPL-MCNC: 3.3 MMOL/L — LOW (ref 3.5–5.3)
POTASSIUM SERPL-MCNC: 3.8 MMOL/L — SIGNIFICANT CHANGE UP (ref 3.5–5.3)
POTASSIUM SERPL-SCNC: 3.3 MMOL/L — LOW (ref 3.5–5.3)
POTASSIUM SERPL-SCNC: 3.8 MMOL/L — SIGNIFICANT CHANGE UP (ref 3.5–5.3)
PROT UR-MCNC: NEGATIVE — SIGNIFICANT CHANGE UP
RBC CASTS # UR COMP ASSIST: ABNORMAL /HPF (ref 0–4)
SARS-COV-2 RNA SPEC QL NAA+PROBE: SIGNIFICANT CHANGE UP
SODIUM SERPL-SCNC: 144 MMOL/L — SIGNIFICANT CHANGE UP (ref 135–145)
SODIUM SERPL-SCNC: 144 MMOL/L — SIGNIFICANT CHANGE UP (ref 135–145)
SP GR SPEC: 1.01 — SIGNIFICANT CHANGE UP (ref 1.01–1.02)
TROPONIN T SERPL-MCNC: 0.01 NG/ML — SIGNIFICANT CHANGE UP (ref 0–0.06)
UROBILINOGEN FLD QL: NEGATIVE — SIGNIFICANT CHANGE UP
WBC UR QL: ABNORMAL

## 2021-12-14 PROCEDURE — 93010 ELECTROCARDIOGRAM REPORT: CPT

## 2021-12-14 PROCEDURE — 99233 SBSQ HOSP IP/OBS HIGH 50: CPT

## 2021-12-14 PROCEDURE — 93306 TTE W/DOPPLER COMPLETE: CPT | Mod: 26

## 2021-12-14 PROCEDURE — 71045 X-RAY EXAM CHEST 1 VIEW: CPT | Mod: 26

## 2021-12-14 RX ORDER — TRAMADOL HYDROCHLORIDE 50 MG/1
50 TABLET ORAL DAILY
Refills: 0 | Status: DISCONTINUED | OUTPATIENT
Start: 2021-12-14 | End: 2021-12-14

## 2021-12-14 RX ORDER — VALSARTAN 80 MG/1
160 TABLET ORAL DAILY
Refills: 0 | Status: DISCONTINUED | OUTPATIENT
Start: 2021-12-14 | End: 2021-12-15

## 2021-12-14 RX ORDER — ENOXAPARIN SODIUM 100 MG/ML
40 INJECTION SUBCUTANEOUS DAILY
Refills: 0 | Status: DISCONTINUED | OUTPATIENT
Start: 2021-12-15 | End: 2021-12-23

## 2021-12-14 RX ORDER — ENOXAPARIN SODIUM 100 MG/ML
70 INJECTION SUBCUTANEOUS ONCE
Refills: 0 | Status: COMPLETED | OUTPATIENT
Start: 2021-12-14 | End: 2021-12-14

## 2021-12-14 RX ORDER — FUROSEMIDE 40 MG
40 TABLET ORAL DAILY
Refills: 0 | Status: DISCONTINUED | OUTPATIENT
Start: 2021-12-14 | End: 2021-12-15

## 2021-12-14 RX ORDER — TRAMADOL HYDROCHLORIDE 50 MG/1
1 TABLET ORAL
Qty: 0 | Refills: 0 | DISCHARGE

## 2021-12-14 RX ORDER — CHOLECALCIFEROL (VITAMIN D3) 125 MCG
1 CAPSULE ORAL
Qty: 0 | Refills: 0 | DISCHARGE

## 2021-12-14 RX ORDER — ASCORBIC ACID 60 MG
500 TABLET,CHEWABLE ORAL DAILY
Refills: 0 | Status: DISCONTINUED | OUTPATIENT
Start: 2021-12-14 | End: 2021-12-23

## 2021-12-14 RX ORDER — VALSARTAN 80 MG/1
0 TABLET ORAL
Qty: 0 | Refills: 0 | DISCHARGE

## 2021-12-14 RX ORDER — FUROSEMIDE 40 MG
1 TABLET ORAL
Qty: 0 | Refills: 0 | DISCHARGE

## 2021-12-14 RX ORDER — POTASSIUM CHLORIDE 20 MEQ
20 PACKET (EA) ORAL
Refills: 0 | Status: COMPLETED | OUTPATIENT
Start: 2021-12-14 | End: 2021-12-14

## 2021-12-14 RX ORDER — CHOLECALCIFEROL (VITAMIN D3) 125 MCG
400 CAPSULE ORAL DAILY
Refills: 0 | Status: DISCONTINUED | OUTPATIENT
Start: 2021-12-14 | End: 2021-12-23

## 2021-12-14 RX ORDER — ENOXAPARIN SODIUM 100 MG/ML
40 INJECTION SUBCUTANEOUS DAILY
Refills: 0 | Status: DISCONTINUED | OUTPATIENT
Start: 2021-12-14 | End: 2021-12-14

## 2021-12-14 RX ORDER — ASPIRIN/CALCIUM CARB/MAGNESIUM 324 MG
81 TABLET ORAL
Qty: 0 | Refills: 0 | DISCHARGE

## 2021-12-14 RX ORDER — SODIUM CHLORIDE 9 MG/ML
250 INJECTION INTRAMUSCULAR; INTRAVENOUS; SUBCUTANEOUS ONCE
Refills: 0 | Status: COMPLETED | OUTPATIENT
Start: 2021-12-14 | End: 2021-12-14

## 2021-12-14 RX ORDER — METOPROLOL TARTRATE 50 MG
25 TABLET ORAL
Qty: 0 | Refills: 0 | DISCHARGE

## 2021-12-14 RX ORDER — ASCORBIC ACID 60 MG
1 TABLET,CHEWABLE ORAL
Qty: 0 | Refills: 0 | DISCHARGE

## 2021-12-14 RX ORDER — ASPIRIN/CALCIUM CARB/MAGNESIUM 324 MG
81 TABLET ORAL DAILY
Refills: 0 | Status: DISCONTINUED | OUTPATIENT
Start: 2021-12-14 | End: 2021-12-23

## 2021-12-14 RX ORDER — POTASSIUM CHLORIDE 20 MEQ
15 PACKET (EA) ORAL
Qty: 0 | Refills: 0 | DISCHARGE

## 2021-12-14 RX ADMIN — ENOXAPARIN SODIUM 70 MILLIGRAM(S): 100 INJECTION SUBCUTANEOUS at 09:52

## 2021-12-14 RX ADMIN — Medication 81 MILLIGRAM(S): at 11:20

## 2021-12-14 RX ADMIN — Medication 20 MILLIEQUIVALENT(S): at 09:52

## 2021-12-14 RX ADMIN — Medication 400 UNIT(S): at 11:20

## 2021-12-14 RX ADMIN — Medication 40 MILLIGRAM(S): at 06:21

## 2021-12-14 RX ADMIN — SODIUM CHLORIDE 250 MILLILITER(S): 9 INJECTION INTRAMUSCULAR; INTRAVENOUS; SUBCUTANEOUS at 18:56

## 2021-12-14 RX ADMIN — Medication 500 MILLIGRAM(S): at 11:20

## 2021-12-14 RX ADMIN — VALSARTAN 160 MILLIGRAM(S): 80 TABLET ORAL at 06:21

## 2021-12-14 RX ADMIN — Medication 20 MILLIEQUIVALENT(S): at 08:55

## 2021-12-14 RX ADMIN — Medication 1 TABLET(S): at 11:20

## 2021-12-14 NOTE — CONSULT NOTE ADULT - SUBJECTIVE AND OBJECTIVE BOX
[facility], [facility]                                                    [address] , [address], [place] [address]                                                         PHONE: [phone]    FAX: [phone] [address], [address], [place] [address]                                                 PHONE: [phone]    FAX: [phone]  *******************************************************************************  cc: SOB    HPI: [id number] who presents with SOB. Hx of PAF (declined AC). dual chamberPPM 8/22/14 for complete heart block, HTN and normal LV function. Dobutamine nuclear stress test 4/28/17 no ischemia EF 76%, echo 7/28/20 normal EF, mild Aortic stenosis, mild TR, carotid 7/28/20 16-49% Right and 50-65% left carotid.       Overnight events/Subjective Assessment:    INTERPRETATION OF TELEMETRY (personally reviewed):    PAST MEDICAL & SURGICAL HISTORY:  Hypertension    Diabetes mellitus  type 2    Carotid artery disease    Cataract    Hernia    COPD (chronic obstructive pulmonary disease)    Dyslipidemia    GERD (gastroesophageal reflux disease)    CHF (congestive heart failure)    BBB (bundle branch block)  left    Pneumonia    Obesity    Hypertension    CAD (coronary artery disease)    Artificial cardiac pacemaker  8/2014    History of tonsillectomy    After cataract, right eye  3/31/2015    Adjustment and management of cardiac pacemaker        ibuprofen (Rash)  levofloxacin (Hives)      MEDICATIONS  (STANDING):  ascorbic acid 500 milliGRAM(s) Oral daily  aspirin enteric coated 81 milliGRAM(s) Oral daily  cholecalciferol 400 Unit(s) Oral daily  enoxaparin Injectable 40 milliGRAM(s) SubCutaneous daily  furosemide   Injectable 40 milliGRAM(s) IV Push daily  multivitamin 1 Tablet(s) Oral daily  valsartan 160 milliGRAM(s) Oral daily    MEDICATIONS  (PRN):  ALBUTerol    90 MICROgram(s) HFA Inhaler 2 Puff(s) Inhalation every 4 hours PRN Shortness of Breath and/or Wheezing  traMADol 50 milliGRAM(s) Oral daily PRN Moderate to Severe Pain      Vital Signs Last 24 Hrs  T(C): 37.3 (14 Dec 2021 04:14), Max: 37.3 (14 Dec 2021 04:14)  T(F): 99.1 (14 Dec 2021 04:14), Max: 99.1 (14 Dec 2021 04:14)  HR: 106 (14 Dec 2021 04:14) (93 - 110)  BP: 137/88 (14 Dec 2021 04:14) (137/88 - 179/77)  BP(mean): --  RR: 18 (14 Dec 2021 04:14) (18 - 24)  SpO2: 93% (14 Dec 2021 04:14) (88% - 94%)    I&O's Detail    I&O's Summary          PHYSICAL EXAM:  General: Appears well developed, well nourished, no acute distress. not in acute pain  HEAD: normal cephalic. Atraumatic  PUPILS: equal and reactive to light  EARS: normal hearing  NECK: supple. no JVD or HJR. no carotid bruits. no visible lymphadenopathy  NOSE: no gross abnormalities  CHEST: symmetric chest wall expansion  CARDIOVASCULAR: Normal rate. Regular rhythm. Normal S1 and S2, no S3/S4,  no murmur, rub, or gallop  LUNGS: Normal effort. Normal respiratory rate. Breath sounds are clear to auscultation bilaterally. No respiratory distress. No stridor.  no rales, rhonchi or wheeze. no decreased Breath sounds  ABDOMEN: Soft, nontender, non-distended, positive bowel sounds, no mass or bruit. no abdominal tenderness. No rebound. no ascites  EXTREMITIES: No clubbing, cyanosis or edema. normal range of motion  PULSES:  distal pulses WNL  SKIN: Warm and dry with normal turgor. no visible rash or cyanosis   NEURO: Alert & oriented x 3, grossly intact with no focal weakness  PSYCH: normal mood and affect. Grossly normal insight and judgement exhibited    FAMILY HISTORY:  Family history of CHF (congestive heart failure)        SOCIAL HISTORY:  former smoking-quit 1974. No ETOH/No IVDA    REVIEW OF SYSTEMS:  Constitutional: no fever, chills or malaise. No weight loss  Head: no trauma  Eyes: no visual deficit. No double vision  Ears: no hearing deficit or ringing in the ears  Nose: no nose bleeds or smell changes or congestion  Throat: no difficult swallowing or painful swallowing  Neck: supple. No lymphadenopathy or swelling  Respiratory: no SOB, wheeze, asthma, COPD. No cough. No blood in the sputum  Cardiovascular: no CP, palpitations, irregular heart beats. No edema. No PND. No orthopnea. No skin/temperature or color changes  Gastrointestinal: no abdominal pain. No constipation. No diarrhea. No melena. No nausea. No vomiting. No bloating  Genitourinary: no frequency or urgency. No hematuria  Lymphatics: no grossly swollen lymph nodes  Musculoskeletal: no limitation of range of motion. Normal strength. No pain  Integumentary: no visible rash. No itching  Neurologic: no HA. No TIA or stroke symptoms. No seizure. No hx of epilepsy. No tingling or numbness. No weakness. No dizziness  Psychiatric: denied. Reports appropriate mood.        LABS:                        11.7   7.15  )-----------( 265      ( 13 Dec 2021 17:49 )             36.8     12-14    144  |  104  |  24.2<H>  ----------------------------<  168<H>  3.3<L>   |  22.0  |  1.05    Ca    8.9      14 Dec 2021 04:21  Mg     2.2     12-13    TPro  7.1  /  Alb  3.6  /  TBili  0.4  /  DBili  x   /  AST  25  /  ALT  12  /  AlkPhos  88  12-13    CARDIAC MARKERS ( 14 Dec 2021 04:21 )  x     / 0.01 ng/mL / 35 U/L / x     / x      CARDIAC MARKERS ( 13 Dec 2021 17:49 )  x     / <0.01 ng/mL / x     / x     / x            Serum Pro-Brain Natriuretic Peptide: 2829 pg/mL (12-13 @ 17:49)  serum  Lipids:         RADIOLOGY & ADDITIONAL STUDIES:    ECG:          ASSESSMENT AND PLAN:  In summary, MANUEL BURRELL is a 89y Female with past medical history significant for           Thank you for allowing me to participate in the care of your patient.     Yu Reese MD                                                               [facility], [facility]                                                    [address], [address], [place] [address]                                                         PHONE: [phone]    FAX: [phone] [address], [address], [place] [address]                                                 PHONE: [phone]    FAX: [phone]  *******************************************************************************  cc: SOB    HPI: 89F who presents with SOB and hypoxia from PMD's office.  Dietary indescetion. Hx of HFpEF.  Hx of PAF (declined AC). dual chamberPPM 8/22/14 for complete heart block, HTN and normal LV function. Dobutamine nuclear stress test 4/28/17 no ischemia EF 76%, echo 7/28/20 normal EF, mild Aortic stenosis, mild TR, carotid 7/28/20 16-49% Right and 50-65% left carotid.       Overnight events/Subjective Assessment:    INTERPRETATION OF TELEMETRY (personally reviewed):    PAST MEDICAL & SURGICAL HISTORY:  Hypertension    Diabetes mellitus  type 2    Carotid artery disease    Cataract    Hernia    COPD (chronic obstructive pulmonary disease)    Dyslipidemia    GERD (gastroesophageal reflux disease)    CHF (congestive heart failure)    BBB (bundle branch block)  left    Pneumonia    Obesity    Hypertension    CAD (coronary artery disease)    Artificial cardiac pacemaker  8/2014    History of tonsillectomy    After cataract, right eye  3/31/2015    Adjustment and management of cardiac pacemaker        ibuprofen (Rash)  levofloxacin (Hives)      MEDICATIONS  (STANDING):  ascorbic acid 500 milliGRAM(s) Oral daily  aspirin enteric coated 81 milliGRAM(s) Oral daily  cholecalciferol 400 Unit(s) Oral daily  enoxaparin Injectable 40 milliGRAM(s) SubCutaneous daily  furosemide   Injectable 40 milliGRAM(s) IV Push daily  multivitamin 1 Tablet(s) Oral daily  valsartan 160 milliGRAM(s) Oral daily    MEDICATIONS  (PRN):  ALBUTerol    90 MICROgram(s) HFA Inhaler 2 Puff(s) Inhalation every 4 hours PRN Shortness of Breath and/or Wheezing  traMADol 50 milliGRAM(s) Oral daily PRN Moderate to Severe Pain      Vital Signs Last 24 Hrs  T(C): 37.3 (14 Dec 2021 04:14), Max: 37.3 (14 Dec 2021 04:14)  T(F): 99.1 (14 Dec 2021 04:14), Max: 99.1 (14 Dec 2021 04:14)  HR: 106 (14 Dec 2021 04:14) (93 - 110)  BP: 137/88 (14 Dec 2021 04:14) (137/88 - 179/77)  BP(mean): --  RR: 18 (14 Dec 2021 04:14) (18 - 24)  SpO2: 93% (14 Dec 2021 04:14) (88% - 94%)    I&O's Detail    I&O's Summary          PHYSICAL EXAM:  General: Appears well developed, well nourished, no acute distress. not in acute pain  HEAD: normal cephalic. Atraumatic  PUPILS: equal and reactive to light  EARS: normal hearing  NECK: supple. no JVD or HJR. no carotid bruits. no visible lymphadenopathy  NOSE: no gross abnormalities  CHEST: symmetric chest wall expansion  CARDIOVASCULAR: Normal rate. Regular rhythm. Normal S1 and S2, no S3/S4,  no murmur, rub, or gallop  LUNGS: Normal effort. Normal respiratory rate. Breath sounds are clear to auscultation bilaterally. No respiratory distress. No stridor.  no rales, rhonchi or wheeze. no decreased Breath sounds  ABDOMEN: Soft, nontender, non-distended, positive bowel sounds, no mass or bruit. no abdominal tenderness. No rebound. no ascites  EXTREMITIES: No clubbing, cyanosis or edema. normal range of motion  PULSES:  distal pulses WNL  SKIN: Warm and dry with normal turgor. no visible rash or cyanosis   NEURO: Alert & oriented x 3, grossly intact with no focal weakness  PSYCH: normal mood and affect. Grossly normal insight and judgement exhibited    FAMILY HISTORY:  Family history of CHF (congestive heart failure)        SOCIAL HISTORY:  former smoking-quit 1974. No ETOH/No IVDA    REVIEW OF SYSTEMS:  Constitutional: no fever, chills or malaise. No weight loss  Head: no trauma  Eyes: no visual deficit. No double vision  Ears: no hearing deficit or ringing in the ears  Nose: no nose bleeds or smell changes or congestion  Throat: no difficult swallowing or painful swallowing  Neck: supple. No lymphadenopathy or swelling  Respiratory: no SOB, wheeze, asthma, COPD. No cough. No blood in the sputum  Cardiovascular: no CP, palpitations, irregular heart beats. No edema. No PND. No orthopnea. No skin/temperature or color changes  Gastrointestinal: no abdominal pain. No constipation. No diarrhea. No melena. No nausea. No vomiting. No bloating  Genitourinary: no frequency or urgency. No hematuria  Lymphatics: no grossly swollen lymph nodes  Musculoskeletal: no limitation of range of motion. Normal strength. No pain  Integumentary: no visible rash. No itching  Neurologic: no HA. No TIA or stroke symptoms. No seizure. No hx of epilepsy. No tingling or numbness. No weakness. No dizziness  Psychiatric: denied. Reports appropriate mood.        LABS:                        11.7   7.15  )-----------( 265      ( 13 Dec 2021 17:49 )             36.8     12-14    144  |  104  |  24.2<H>  ----------------------------<  168<H>  3.3<L>   |  22.0  |  1.05    Ca    8.9      14 Dec 2021 04:21  Mg     2.2     12-13    TPro  7.1  /  Alb  3.6  /  TBili  0.4  /  DBili  x   /  AST  25  /  ALT  12  /  AlkPhos  88  12-13    CARDIAC MARKERS ( 14 Dec 2021 04:21 )  x     / 0.01 ng/mL / 35 U/L / x     / x      CARDIAC MARKERS ( 13 Dec 2021 17:49 )  x     / <0.01 ng/mL / x     / x     / x            Serum Pro-Brain Natriuretic Peptide: 2829 pg/mL (12-13 @ 17:49)  serum  Lipids:         RADIOLOGY & ADDITIONAL STUDIES:    ECG:          ASSESSMENT AND PLAN:  In summary, MANUEL BURRELL is a 89y Female with past medical history significant for           Thank you for allowing me to participate in the care of your patient.     Yu Reese MD                                                               [facility]                                                    [address], [address], [place] [address]                                                         PHONE: [phone]    FAX: [phone] [address], [address], [place] [address]                                                 PHONE: [phone]    FAX: [phone]  *******************************************************************************  cc: SOB    HPI: 89F who presents with SOB and hypoxia from PMD's office. Patient reports she sleeps with 2 ;illows normally and this has remained unchanged. No PND. No CP. No palpitations. No dizziness or syncope. No fever, chills or constitutional sx.  Sx have been present for 3-4 days.  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 Hx of HFpEF.  Hx of PAF (declined AC). dual chamber PPM 8/22/14 for complete heart block, HTN and normal LV function. Dobutamine nuclear stress test 4/28/17 no ischemia EF 76%, echo 7/28/20 normal EF, mild Aortic stenosis, mild TR, carotid 7/28/20 16-49% Right and 50-65% left carotid.       Overnight events/Subjective Assessment: improvement of breathing with diuresis.    INTERPRETATION OF TELEMETRY (personally reviewed): AF    PAST MEDICAL & SURGICAL HISTORY:  Hypertension    Diabetes mellitus  type 2    Carotid artery disease    Cataract    Hernia    COPD (chronic obstructive pulmonary disease)    Dyslipidemia    GERD (gastroesophageal reflux disease)    CHF (congestive heart failure)    BBB (bundle branch block)  left    Pneumonia    Obesity    Hypertension    CAD (coronary artery disease)    Artificial cardiac pacemaker  8/2014    History of tonsillectomy    After cataract, right eye  3/31/2015    Adjustment and management of cardiac pacemaker        ibuprofen (Rash)  levofloxacin (Hives)      MEDICATIONS  (STANDING):  ascorbic acid 500 milliGRAM(s) Oral daily  aspirin enteric coated 81 milliGRAM(s) Oral daily  cholecalciferol 400 Unit(s) Oral daily  enoxaparin Injectable 40 milliGRAM(s) SubCutaneous daily  furosemide   Injectable 40 milliGRAM(s) IV Push daily  multivitamin 1 Tablet(s) Oral daily  valsartan 160 milliGRAM(s) Oral daily    MEDICATIONS  (PRN):  ALBUTerol    90 MICROgram(s) HFA Inhaler 2 Puff(s) Inhalation every 4 hours PRN Shortness of Breath and/or Wheezing  traMADol 50 milliGRAM(s) Oral daily PRN Moderate to Severe Pain      Vital Signs Last 24 Hrs  T(C): 37.3 (14 Dec 2021 04:14), Max: 37.3 (14 Dec 2021 04:14)  T(F): 99.1 (14 Dec 2021 04:14), Max: 99.1 (14 Dec 2021 04:14)  HR: 106 (14 Dec 2021 04:14) (93 - 110)  BP: 137/88 (14 Dec 2021 04:14) (137/88 - 179/77)  BP(mean): --  RR: 18 (14 Dec 2021 04:14) (18 - 24)  SpO2: 93% (14 Dec 2021 04:14) (88% - 94%)    I&O's Detail    I&O's Summary          PHYSICAL EXAM:  General: Appears well developed, well nourished, no acute distress. not in acute pain  HEAD: normal cephalic. Atraumatic  PUPILS: equal and reactive to light  EARS: normal hearing  NECK: supple. no JVD or HJR. no carotid bruits. no visible lymphadenopathy  NOSE: no gross abnormalities  CHEST: symmetric chest wall expansion  CARDIOVASCULAR: Normal rate. irregular irregular rhythm. Normal S1 and S2, no S3/S4,  no murmur, rub, or gallop  LUNGS: Normal effort. Normal respiratory rate. Breath sounds are clear to auscultation bilaterally. No respiratory distress. No stridor.  no rales, rhonchi or wheeze. no decreased Breath sounds  ABDOMEN: Soft, nontender, non-distended, positive bowel sounds, no mass or bruit. no abdominal tenderness. No rebound. no ascites  EXTREMITIES: No clubbing, cyanosis or edema. normal range of motion  PULSES:  distal pulses WNL  SKIN: Warm and dry with normal turgor. no visible rash or cyanosis   NEURO: Alert & oriented x 3, grossly intact with no focal weakness  PSYCH: normal mood and affect. Grossly normal insight and judgement exhibited    FAMILY HISTORY:  Family history of CHF (congestive heart failure) for mother. No family hx of ischemic heart disease for her father        SOCIAL HISTORY:  former smoking-quit 1974. No ETOH/No IVDA    REVIEW OF SYSTEMS:  Constitutional: no fever, chills or malaise. No weight loss  Head: no trauma  Eyes: no visual deficit. No double vision  Ears: no hearing deficit or ringing in the ears  Nose: no nose bleeds or smell changes or congestion  Throat: no difficult swallowing or painful swallowing  Neck: supple. No lymphadenopathy or swelling  Respiratory: + SOB, no wheeze, asthma, COPD. No cough. No blood in the sputum  Cardiovascular: no CP, palpitations, irregular heart beats. No edema. No PND. No orthopnea. No skin/temperature or color changes  Gastrointestinal: no abdominal pain. No constipation. No diarrhea. No melena. No nausea. No vomiting. No bloating  Genitourinary: no frequency or urgency. No hematuria  Lymphatics: no grossly swollen lymph nodes  Musculoskeletal: no limitation of range of motion. Normal strength. No pain  Integumentary: no visible rash. No itching  Neurologic: no HA. No TIA or stroke symptoms. No seizure. No hx of epilepsy. No tingling or numbness. No weakness. No dizziness  Psychiatric: denied. Reports appropriate mood.        LABS:                        11.7   7.15  )-----------( 265      ( 13 Dec 2021 17:49 )             36.8     12-14    144  |  104  |  24.2<H>  ----------------------------<  168<H>  3.3<L>   |  22.0  |  1.05    Ca    8.9      14 Dec 2021 04:21  Mg     2.2     12-13    TPro  7.1  /  Alb  3.6  /  TBili  0.4  /  DBili  x   /  AST  25  /  ALT  12  /  AlkPhos  88  12-13    CARDIAC MARKERS ( 14 Dec 2021 04:21 )  x     / 0.01 ng/mL / 35 U/L / x     / x      CARDIAC MARKERS ( 13 Dec 2021 17:49 )  x     / <0.01 ng/mL / x     / x     / x            Serum Pro-Brain Natriuretic Peptide: 2829 pg/mL (12-13 @ 17:49)  serum  Lipids:         RADIOLOGY & ADDITIONAL STUDIES:    ECG: AF 80 V paced          ASSESSMENT AND PLAN:  In summary, MANUEL BURRELL is a 89y Female with past medical history significant for  presentation with SOB and hypoxia from PMD's office. Patient reports she sleeps with 2 ;illows normally and this has remained unchanged. No PND. No CP. No palpitations. No dizziness or syncope. Sx have been present for 3-4 days.  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 Hx of HFpEF.  Hx of PAF (declined AC). dual chamber PPM 8/22/14 for complete heart block, HTN and normal LV function. Dobutamine nuclear stress test 4/28/17 no ischemia EF 76%, echo 7/28/20 normal EF, mild Aortic stenosis, mild TR, carotid 7/28/20 16-49% Right and 50-65% left carotid.     - Acute on chronic systolic CHF due to dietary indiscretion. IV diuresis with lasix 40mg daily. Probable po in the AM    - Salt restriction and dietary modification dw the patient    - PAF. elevated [id number] score.  I have dw the patient risk and benefits of AC therapy including that of stroke. She reports no prior hx of stroke. She understands her risks and again has declined AC. Intermittent HR in the 40's on monitor (paced)    - HTN. lasix and valsartan    - There are no sx to suggest ACS or hemodynamic compromise. Pt has 2 sets negative CE and has ruled out for ACS. Nuclear stress 4/'17 no ischemia with normal LV function    - Echo 7/'20 EF normal. mild aortic stenosis. May follow in the office on DC    - PPM. follow up via the office on DC    - Telemetry monitoring personally reviewed by me. AF with v pacing and intermittent pacing of HR 40's    - ECG personally reviewed by me    - radiologic imaging reviewed    - Laboratory data reviewed.    - I have personally reviewed all obtainable prior records and data      Thank you for allowing me to participate in the care of your patient.     Yu Reese MD

## 2021-12-14 NOTE — H&P ADULT - NSHPLABSRESULTS_GEN_ALL_CORE
11.7   7.15  )-----------( 265      ( 13 Dec 2021 17:49 )             36.8       12-14    144  |  104  |  24.2<H>  ----------------------------<  168<H>  3.3<L>   |  22.0  |  1.05    Ca    8.9      14 Dec 2021 04:21  Mg     2.2     12-13    TPro  7.1  /  Alb  3.6  /  TBili  0.4  /  DBili  x   /  AST  25  /  ALT  12  /  AlkPhos  88  12-13    EKG: atrial sensed, v-paced rhythm, HR 80    CXR personally reviewed: small bilateral pleural effusions, worse on left side, evidence of intersitial vascular congestion

## 2021-12-14 NOTE — H&P ADULT - ASSESSMENT
89yoF hx complete heart block s/p PPM (2014), HFpEF (last known EF 50-55%), HTN, chronic pain from severe OA of knees, ?COPD, HTN, HLD, non-compliant with statin, presenting with several days of exertional dyspnea after eating fast food, who was sent to hospital from PMD office for hypoxia in their office with concern for acute CHF exacerbation     Acute hypoxemic respiratory failure due to suspected diastolic CHF with acute decompensation  -Likely precipitated by recent dietary indiscretions after eating fast food/cheeseburgers  -O2 sat 78% on room air at outpatient office, was placed on 4L nasal cannula with improvement  -CXR personally reviewed, shows small bilateral pleural effusions, worse on left side, evidence of intersitial pulmonary edema  -EKG shows atrial sensed v-paced rhythm, HR 80s  -Worsening proBNP from prior values, from 300 to 2K range   -Received IV lasix 40mg in ED, will continue lasix 40mg daily for now  -Last TTE performed in 2018 showed EF   -Wean supplemental O2 as tolerated   -Telemetry and  monitoring  -Consider cardiology evaluation if no improvement     Hypernatremia  -Na 150, mental status intact  -Unable to given D5 or ½ NS given concurrent suspected CHF exacerbation  -Repeat BMP sent  -Encourage PO free water intake up to 1L (given fluid restriction with CHF)  -If worsening with diuretic, consider nephrology evaluation for further management     Hx HTN  -On valsartan    Hx HLD  -Per outpatient PMD note, pt declines to use statins    Hx chronic pain due to osteoarthritis  -On tramadol PRN    Prophylactic measure  -Lovenox 89yoF hx complete heart block s/p PPM (2014), HFpEF (last known EF 50-55%), HTN, chronic pain from severe OA of knees, ?COPD, HTN, HLD, non-compliant with statin, presenting with several days of exertional dyspnea after eating fast food, who was sent to hospital from PMD office for hypoxia in their office with concern for acute CHF exacerbation     Acute hypoxemic respiratory failure due to suspected diastolic CHF with acute decompensation  -Likely precipitated by recent dietary indiscretions after eating fast food/cheeseburgers  -O2 sat 78% on room air at outpatient office, was placed on 4L nasal cannula with improvement  -CXR personally reviewed, shows small bilateral pleural effusions, worse on left side, evidence of intersitial pulmonary edema  -EKG shows atrial sensed v-paced rhythm, HR 80s  -Worsening proBNP from prior values, from 300 to 2K range   -Received IV lasix 40mg in ED, will continue lasix 40mg daily for now  -Last TTE performed in 2018 showed diastolic dysfunction, will repeat to assess for interval changes  -Wean supplemental O2 as tolerated   -Telemetry and  monitoring  -Consider cardiology evaluation if no improvement     Hypernatremia  -Na 150, mental status intact  -Unable to given D5 or ½ NS given concurrent suspected CHF exacerbation  -Repeat BMP ordered, currently pending, follow up  -Encourage PO free water intake up to 1L (given fluid restriction with CHF)  -If worsening with diuretic, consider nephrology evaluation for further management     Hx HTN  -On valsartan    Hx HLD  -Per outpatient PMD note, pt declines to use statins    Hx chronic pain due to osteoarthritis  -On tramadol PRN    Prophylactic measure  -Lovenox

## 2021-12-14 NOTE — H&P ADULT - NSHPPHYSICALEXAM_GEN_ALL_CORE
Vital Signs Last 24 Hrs  T(C): 37.3 (14 Dec 2021 04:14), Max: 37.3 (14 Dec 2021 04:14)  T(F): 99.1 (14 Dec 2021 04:14), Max: 99.1 (14 Dec 2021 04:14)  HR: 106 (14 Dec 2021 04:14) (93 - 110)  BP: 137/88 (14 Dec 2021 04:14) (137/88 - 179/77)  BP(mean): --  RR: 18 (14 Dec 2021 04:14) (18 - 24)  SpO2: 93% (14 Dec 2021 04:14) (88% - 94%)    GENERAL:  Well-appearing elderly female, mild tachypnea, wearing nasal cannula   EYES:  Clear conjunctiva, extraocular movement intact  ENT: Moist mucous membranes  RESP:  Mild tachypnea, decreased bilateral breath sounds in both lung fields   CV: Regular rate and rhythm, no murmurs appreciated, no lower extremity edema  GI: Soft, non-tender, non-distended  NEURO: Awake, alert, conversant, UE strength 5/5, LE strength about 4/5 (likely due to chronic deconditioned functional status), light touch sensation grossly intact  PSYCH: Calm, cooperative  SKIN: No rash or lesions, warm and dry

## 2021-12-14 NOTE — CHART NOTE - NSCHARTNOTEFT_GEN_A_CORE
Saw patient at bedside who is currently undergoing diuresis    cardiology aware of 40 BPM pacing; she alternates between pacing at 40 to a-fib with HR of     d-dimer below age adjusted cut off    check CT of chest to evaluate of interstitial lung disease vs CHF    Currently on NRB oxygen    Plan to discharge home with home O2 if able to lower O2 requirements

## 2021-12-14 NOTE — GOALS OF CARE CONVERSATION - ADVANCED CARE PLANNING - CONVERSATION DETAILS
Discussed with Italia (close friend) regarding Mrs. Brown admission, treatment plan and current condition. She explains that patient has no children, was  but  . At this time patient has filled out a HCP (specifying Italia Crawley as proxy), advanced directives (DNR but she is unsure of DNI), and living will. She does not have copies of this on hand. Explained that at this time since we have no records she is full code.     We will call her for any changes in clinical condition to clarify advanced directives if appropriate. Discussed with Italia that clinical course could worsen overnight requiring evaluation for intubation and she is in understanding.    Will obtain copies of these records in AM once she locates them at patient's house.

## 2021-12-14 NOTE — CHART NOTE - NSCHARTNOTEFT_GEN_A_CORE
Pt seen resting comfortably in bed. NAD, A & O x 3.  No complaints.  BP 96/48  HR 50. O2 sat 98% on HFNC.  Pt going to echo now.

## 2021-12-14 NOTE — H&P ADULT - HISTORY OF PRESENT ILLNESS
89yoF hx complete heart block s/p PPM (2014), HFpEF (last known EF 50-55%), HTN, chronic pain from severe OA of knees, ?COPD, HTN, HLD, non-compliant with statin who was sent from PMD office for hypoxia and concern for volume overload.  Pt reports 3-4 days of progressive exertional dyspnea which she reports started after she ate ‘[employer]’ cheeseburger from [employer] a few days ago.  She is on lasix 20mg and reports compliance with her diuretic.  She denies any recent episodes of chest pain. She also denies any recent lightheadedness, syncopal events, orthopnea, worsening leg swelling, fevers, chills or cough.  Pt went to her PMD yesterday where she was found to be markedly dyspnea when moving around in exam room and was noted to be satting 78% on room air. She was placed on 4L nasal cannula at the office and EMS was called who transported the pt to [facility] for further evaluation.

## 2021-12-14 NOTE — RAPID RESPONSE TEAM SUMMARY - NSSITUATIONBACKGROUNDRRT_GEN_ALL_CORE
89yoF hx complete heart block s/p PPM (2014), HFpEF (last known EF 50-55%), HTN, chronic pain from severe OA of knees, ?COPD, HTN, HLD, non-compliant with statin, presenting with several days of exertional dyspnea after eating fast food, who was sent to hospital from PMD office for hypoxia in their office with concern for acute CHF exacerbation.    Rapid response called for hypotension 70/44   HR 40s paced.  Her hr has been in the 40s most of the day.  Pt is A & O x 3. NAD.  RN reports pt had was being changed when bp dropped. Pt is asymptomatic. Denies dizzyness, HA, lightheadedness. Pt reports her SOB has improved since this morning.  Currently on HFNC at 40L 80%  saturating 99%.  Per cardio note echo 7/28/20 normal EF    Started bolus of 250 cc NS.  Repeat bp 99/44. Dr. Haque present and discussed case Dr. Paulino. He will call EP to consult on pt.  Upgrade to stepdown bed and continue to monitor closely.    6 beats of V-tach noted on rhythm strip. BMP, phos, mag.              89yoF hx complete heart block s/p PPM (2014), HFpEF (last known EF 50-55%), HTN, chronic pain from severe OA of knees, ?COPD, HTN, HLD, non-compliant with statin, presenting with several days of exertional dyspnea after eating fast food, who was sent to hospital from PMD office for hypoxia in their office with concern for acute CHF exacerbation.    Rapid response called for hypotension 70/44   HR 40s paced afebrile.  Her hr has been in the 40s most of the day.  Pt is A & O x 3. NAD.  RN reports pt had was being changed when bp dropped. Pt is asymptomatic. Denies dizzyness, HA, lightheadedness. Pt reports her SOB has improved since this morning.  Currently on HFNC at 40L 80%  saturating 99%.  Per cardio note echo 7/28/20 normal EF    Started bolus of 250 cc NS.  Repeat bp 99/44. Dr. Haque present and discussed case Dr. Paulino. He will call EP to consult on pt.  Upgrade to stepdown bed and continue to monitor closely.    CVS: Chad, S1, S2  Neuro: A&O x 3, no deficits noted, face symmetrical, speech nml, MS equal  Ext: No LE edema     6 beats of V-tach noted on rhythm strip. BMP, phos, mag.

## 2021-12-14 NOTE — CHART NOTE - NSCHARTNOTEFT_GEN_A_CORE
For details please refer to RRT note   RRT called for hypotension  89yoF hx complete heart block s/p PPM (2014), HFpEF (last known EF 50-55%), HTN admitted for resp failure    Per RN, BP 70s/40  On HiFlo  Tele with Sinus mamta paced rhythm in 40s   On Exam pt appears comfortable  CVS: Mamta, S1, S2  Neuro: A&O x 3  Ext: No LE edema     Agree with small IVF bolus 250 mL over 1 hr as recent EF unknown (last known EF 50% from 2018)  BP 99/45 now improving   Discussed with primary attending, will plan for EP consult   Upgrade pt to SDU   If continues to be hypotensive, low threshold for ICU consult   Discussed with Nocturnist

## 2021-12-15 DIAGNOSIS — J96.01 ACUTE RESPIRATORY FAILURE WITH HYPOXIA: ICD-10-CM

## 2021-12-15 DIAGNOSIS — J44.9 CHRONIC OBSTRUCTIVE PULMONARY DISEASE, UNSPECIFIED: ICD-10-CM

## 2021-12-15 DIAGNOSIS — R06.02 SHORTNESS OF BREATH: ICD-10-CM

## 2021-12-15 DIAGNOSIS — I50.31 ACUTE DIASTOLIC (CONGESTIVE) HEART FAILURE: ICD-10-CM

## 2021-12-15 DIAGNOSIS — J43.9 EMPHYSEMA, UNSPECIFIED: ICD-10-CM

## 2021-12-15 LAB
ANION GAP SERPL CALC-SCNC: 16 MMOL/L — SIGNIFICANT CHANGE UP (ref 5–17)
BASE EXCESS BLDA CALC-SCNC: 5.6 MMOL/L — HIGH (ref -2–3)
BLOOD GAS COMMENTS ARTERIAL: SIGNIFICANT CHANGE UP
BUN SERPL-MCNC: 39.4 MG/DL — HIGH (ref 8–20)
CALCIUM SERPL-MCNC: 8.3 MG/DL — LOW (ref 8.6–10.2)
CHLORIDE SERPL-SCNC: 104 MMOL/L — SIGNIFICANT CHANGE UP (ref 98–107)
CO2 SERPL-SCNC: 21 MMOL/L — LOW (ref 22–29)
CREAT SERPL-MCNC: 1.46 MG/DL — HIGH (ref 0.5–1.3)
CULTURE RESULTS: SIGNIFICANT CHANGE UP
GLUCOSE SERPL-MCNC: 141 MG/DL — HIGH (ref 70–99)
HCO3 BLDA-SCNC: 30 MMOL/L — HIGH (ref 21–28)
HCT VFR BLD CALC: 36.3 % — SIGNIFICANT CHANGE UP (ref 34.5–45)
HGB BLD-MCNC: 11.5 G/DL — SIGNIFICANT CHANGE UP (ref 11.5–15.5)
HOROWITZ INDEX BLDA+IHG-RTO: SIGNIFICANT CHANGE UP
MAGNESIUM SERPL-MCNC: 2.1 MG/DL — SIGNIFICANT CHANGE UP (ref 1.6–2.6)
MCHC RBC-ENTMCNC: 29 PG — SIGNIFICANT CHANGE UP (ref 27–34)
MCHC RBC-ENTMCNC: 31.7 GM/DL — LOW (ref 32–36)
MCV RBC AUTO: 91.4 FL — SIGNIFICANT CHANGE UP (ref 80–100)
PCO2 BLDA: 45 MMHG — HIGH (ref 32–35)
PH BLDA: 7.43 — SIGNIFICANT CHANGE UP (ref 7.35–7.45)
PHOSPHATE SERPL-MCNC: 3.6 MG/DL — SIGNIFICANT CHANGE UP (ref 2.4–4.7)
PLATELET # BLD AUTO: 266 K/UL — SIGNIFICANT CHANGE UP (ref 150–400)
PO2 BLDA: 109 MMHG — HIGH (ref 83–108)
POTASSIUM SERPL-MCNC: 4 MMOL/L — SIGNIFICANT CHANGE UP (ref 3.5–5.3)
POTASSIUM SERPL-SCNC: 4 MMOL/L — SIGNIFICANT CHANGE UP (ref 3.5–5.3)
PROCALCITONIN SERPL-MCNC: 0.15 NG/ML — HIGH (ref 0.02–0.1)
RBC # BLD: 3.97 M/UL — SIGNIFICANT CHANGE UP (ref 3.8–5.2)
RBC # FLD: 13.3 % — SIGNIFICANT CHANGE UP (ref 10.3–14.5)
SAO2 % BLDA: 99 % — HIGH (ref 94–98)
SODIUM SERPL-SCNC: 141 MMOL/L — SIGNIFICANT CHANGE UP (ref 135–145)
SPECIMEN SOURCE: SIGNIFICANT CHANGE UP
WBC # BLD: 10.8 K/UL — HIGH (ref 3.8–10.5)
WBC # FLD AUTO: 10.8 K/UL — HIGH (ref 3.8–10.5)

## 2021-12-15 PROCEDURE — 99233 SBSQ HOSP IP/OBS HIGH 50: CPT

## 2021-12-15 PROCEDURE — 99222 1ST HOSP IP/OBS MODERATE 55: CPT

## 2021-12-15 RX ORDER — IPRATROPIUM/ALBUTEROL SULFATE 18-103MCG
3 AEROSOL WITH ADAPTER (GRAM) INHALATION EVERY 6 HOURS
Refills: 0 | Status: DISCONTINUED | OUTPATIENT
Start: 2021-12-15 | End: 2021-12-20

## 2021-12-15 RX ADMIN — Medication 81 MILLIGRAM(S): at 12:15

## 2021-12-15 RX ADMIN — Medication 500 MILLIGRAM(S): at 12:15

## 2021-12-15 RX ADMIN — ENOXAPARIN SODIUM 40 MILLIGRAM(S): 100 INJECTION SUBCUTANEOUS at 12:15

## 2021-12-15 RX ADMIN — Medication 400 UNIT(S): at 13:39

## 2021-12-15 RX ADMIN — VALSARTAN 160 MILLIGRAM(S): 80 TABLET ORAL at 05:26

## 2021-12-15 RX ADMIN — Medication 1 TABLET(S): at 12:15

## 2021-12-15 RX ADMIN — Medication 3 MILLILITER(S): at 21:45

## 2021-12-15 RX ADMIN — Medication 3 MILLILITER(S): at 16:20

## 2021-12-15 RX ADMIN — Medication 40 MILLIGRAM(S): at 05:26

## 2021-12-15 NOTE — CONSULT NOTE ADULT - ASSESSMENT
-CHF, HFpEF  -Pulm HTN likely  -VHD  -Fluid overload; dietary indescretion  -Hx COPD, emphysema  -Hypoxic resp failure  -renal insufficiency  -obesity  -sob  -elevated d-dimer; normal when adjusted for age; other reasons to explain SOB    RECC:  CT chest is ordered; will do when stable for transport. ABG ordered; pending.  F/U report on LE doppler. Diurese. Change duoneb to ATC. Titrate O2. Cardiology f/u. DVT proph.    Further reccs will come.

## 2021-12-15 NOTE — PROGRESS NOTE ADULT - SUBJECTIVE AND OBJECTIVE BOX
CHIEF COMPLAINT/INTERVAL HISTORY:    Patient is a 89y old  Female who presents with a chief complaint of Acute hypoxemic respiratory failure due to suspected diastolic CHF with acute decompensation (15 Dec 2021 16:47)    SUBJECTIVE & OBJECTIVE: Pt seen and examined at bedside. RRT for hypotension overnight. Patient hemodynamically stable; AAO x 4. Reports feeling better. Denies any acute complaints.     ROS: No chest pain, palpitations, light headedness, dizziness, headache, nausea/vomiting, fevers/chills, abdominal pain, dysuria or increased urinary frequency.    ICU Vital Signs Last 24 Hrs  T(C): 36.6 (15 Dec 2021 14:55), Max: 37.3 (15 Dec 2021 05:04)  T(F): 97.8 (15 Dec 2021 14:55), Max: 99.2 (15 Dec 2021 05:04)  HR: 78 (15 Dec 2021 14:55) (40 - 87)  BP: 102/68 (15 Dec 2021 14:55) (90/48 - 153/79)  RR: 20 (15 Dec 2021 14:55) (18 - 20)  SpO2: 100% (15 Dec 2021 14:55) (95% - 100%)    MEDICATIONS  (STANDING):  albuterol/ipratropium for Nebulization 3 milliLiter(s) Nebulizer every 6 hours  ascorbic acid 500 milliGRAM(s) Oral daily  aspirin enteric coated 81 milliGRAM(s) Oral daily  cholecalciferol 400 Unit(s) Oral daily  enoxaparin Injectable 40 milliGRAM(s) SubCutaneous daily  multivitamin 1 Tablet(s) Oral daily    MEDICATIONS  (PRN):  ALBUTerol    90 MICROgram(s) HFA Inhaler 2 Puff(s) Inhalation every 4 hours PRN Shortness of Breath and/or Wheezing  traMADol 50 milliGRAM(s) Oral daily PRN Moderate to Severe Pain      LABS:                        11.5   10.80 )-----------( 266      ( 15 Dec 2021 11:25 )             36.3     12-15    141  |  104  |  39.4<H>  ----------------------------<  141<H>  4.0   |  21.0<L>  |  1.46<H>    Ca    8.3<L>      15 Dec 2021 07:11  Phos  3.6     12-15  Mg     2.1     12-15        Urinalysis Basic - ( 14 Dec 2021 01:55 )    Color: Yellow / Appearance: Clear / S.010 / pH: x  Gluc: x / Ketone: Negative  / Bili: Negative / Urobili: Negative   Blood: x / Protein: Negative / Nitrite: Positive   Leuk Esterase: Trace / RBC: 3-5 /HPF / WBC 6-10   Sq Epi: x / Non Sq Epi: Occasional / Bacteria: Many    PHYSICAL EXAM:    GENERAL: elderly female, laying in bed, NAD  HEAD:  Atraumatic, Normocephalic  EYES: EOMI, PERRLA, conjunctiva and sclera clear  ENMT: Moist mucous membranes  NECK: Supple   NERVOUS SYSTEM:  Alert & Oriented X3   CHEST/LUNG: coarse breath sounds  HEART: Regular rate and rhythm; + S1/S2  ABDOMEN: Soft, Nontender, Nondistended; Bowel sounds present, + abdominal hernia  EXTREMITIES:  no pedal edema

## 2021-12-15 NOTE — CONSULT NOTE ADULT - REASON FOR ADMISSION
Acute hypoxemic respiratory failure due to suspected diastolic CHF with acute decompensation
Acute hypoxemic respiratory failure due to suspected diastolic CHF with acute decompensation

## 2021-12-15 NOTE — PROGRESS NOTE ADULT - ASSESSMENT
89yoF hx complete heart block s/p PPM (2014), HFpEF (last known EF 50-55%), HTN, chronic pain from severe OA of knees, COPD, HTN, HLD, non-compliant with statin, presenting with several days of exertional dyspnea after eating fast food, who was sent to hospital from PMD office for hypoxia in their office with concern for acute CHF exacerbation.    Acute hypoxemic respiratory failure likely due to Acute on Chronic diastolic HF    -remains hypoxic on hiflo; wean as tolerated  -ABG reviewed; CT chest pending  -Likely precipitated by recent dietary indiscretions after eating fast food/cheeseburgers  -CXR with pulm edema  -BNP ~2000  -Volume status improved s/p IV lasix; appears euvolemic. Hold IV lasix and reassess in 24 hours.  -TTE reviewed; preserved EF. Discussed with cardio. No indication for ischemic eval.  -D-dimer elevated but WNL when corrected for age. Check LE duplex  -Add bronchodilators ATC given history of COPD  -Check procal although low suspicion for infectious process  -Wean supplemental O2 as tolerated   -Cardio and pulm recommendations appreciated    Acute Kidney Injury  -likely due to overdiuresis and renal hypoperfusion  -creatinine peaked at 2.02 and is downtrending to 1.46 s/p IV bolus overnight  -hold lasix and hold ARB  -UA with pyuria; check urine culture  -maintain MAP > 65  -strict I/Os, daily weights  -avoid nephrotoxic agents  -obtain renal/bladder sono if renal function does not continue to improve    Hx HTN  -Hypotensive overnight; BP improved  -Hold ARB and lasix as above    Hx HLD  -Per outpatient PMD note, pt declines to use statins    Hx chronic pain due to osteoarthritis  -On tramadol PRN    Hx of CHB s/p PPM  -tele reviewed    DVT ppx - Lovenox SC    Dispo - Wean off hiflo. CT chest. LE duplex. PT evaluation.     Plan discussed with patient, Dr. Snow. Dr. Castillo, family friend/HCP Walter [phone]

## 2021-12-15 NOTE — PROGRESS NOTE ADULT - SUBJECTIVE AND OBJECTIVE BOX
[facility], [facility]                                          [address], [address], [place] [address]                                               PHONE: [phone]    FAX: [phone] [address], [address], [place] [address]                                       PHONE: [phone]    FAX: [phone]  *******************************************************************************    Overnight events/Subjective Assessment: no acute CV events overnight.  patient reports feeling well and denies any chest pain, palpitations, or shortness of breath, despite being on High flow oxygen via nasal canula.    INTERPRETATION OF TELEMETRY (personally reviewed): all V-paced    ibuprofen (Rash)  levofloxacin (Hives)    MEDICATIONS  (STANDING):  albuterol/ipratropium for Nebulization 3 milliLiter(s) Nebulizer every 6 hours  ascorbic acid 500 milliGRAM(s) Oral daily  aspirin enteric coated 81 milliGRAM(s) Oral daily  cholecalciferol 400 Unit(s) Oral daily  enoxaparin Injectable 40 milliGRAM(s) SubCutaneous daily  furosemide   Injectable 40 milliGRAM(s) IV Push daily  multivitamin 1 Tablet(s) Oral daily  valsartan 160 milliGRAM(s) Oral daily    MEDICATIONS  (PRN):  ALBUTerol    90 MICROgram(s) HFA Inhaler 2 Puff(s) Inhalation every 4 hours PRN Shortness of Breath and/or Wheezing  traMADol 50 milliGRAM(s) Oral daily PRN Moderate to Severe Pain      Vital Signs Last 24 Hrs  T(C): 36.6 (15 Dec 2021 14:55), Max: 37.3 (15 Dec 2021 05:04)  T(F): 97.8 (15 Dec 2021 14:55), Max: 99.2 (15 Dec 2021 05:04)  HR: 78 (15 Dec 2021 14:55) (40 - 87)  BP: 102/68 (15 Dec 2021 14:55) (71/42 - 153/79)  RR: 20 (15 Dec 2021 14:55) (18 - 20)  SpO2: 100% (15 Dec 2021 14:55) (95% - 100%)        PHYSICAL EXAM:  General: Elderly woman seen sitting up comfortably in bed on HiFlow O2 in nad  HEENT: Head: normocephalic, atraumatic  Eyes: Pupils equal and reactive  Neck: Supple, no carotid bruit, no JVD, no HJR  CARDIOVASCULAR: irregularly irregular; 2/6 systolic murmur  LUNGS: Clear to auscultation bilaterally, no rales, rhonchi or wheeze  ABDOMEN: Soft, nontender, non-distended, positive bowel sounds, no mass or bruit  EXTREMITIES: No edema, distal pulses WNL  SKIN: Warm and dry with normal turgor  NEURO: Alert & oriented x 3, grossly intact  PSYCH: normal mood and affect        LABS:                        11.5   10.80 )-----------( 266      ( 15 Dec 2021 11:25 )             36.3     12-15    141  |  104  |  39.4<H>  ----------------------------<  141<H>  4.0   |  21.0<L>  |  1.46<H>    Ca    8.3<L>      15 Dec 2021 07:11  Phos  3.6     12-15  Mg     2.1     12-15    TPro  7.1  /  Alb  3.6  /  TBili  0.4  /  DBili  x   /  AST  25  /  ALT  12  /  AlkPhos  88  12-13    CARDIAC MARKERS ( 14 Dec 2021 04:21 )  x     / 0.01 ng/mL / 35 U/L / x     / x      CARDIAC MARKERS ( 13 Dec 2021 17:49 )  x     / <0.01 ng/mL / x     / x     / x          Serum Pro-Brain Natriuretic Peptide: 2829 pg/mL (12-13 @ 17:49)      RADIOLOGY & ADDITIONAL STUDIES REVIEWED     ASSESSMENT AND PLAN:  In summary, MANUEL BURRELL is a 89y woman with HTN, chronic diastolic HF, persistent afib (off ac), complete heart block s/p PPM (2014), mild aortic stenosis, and mild-moderate b/l internal carotid artery stenoses, admitted for evaluation of acute hypoxic respiratory failure.     - The patient has been chest pain free since admission with negative serial troponin and has ruled out for acute MI.   - Mild acute on chronic diastolic HF: clinically does not appear to be in decompensated HF at this time.  Would hold diuresis particularly given ITZ.   - Echocardiogram (12/2021) reviewed: normal LV size with hyperdynamic LV systolic function (LVEF >75%); likely restrictive diastolic filling pattern; severely dilated LA; mildly dilated RA; normal RV size and systolic function; moderate aortic stenosis; pulmonary artery severely dilated.  - HTN: BP low normal since admission: would hold ARB particularly in light of ITZ.   - Moderate aortic stenosis: plan for repeat echocardiogram in 6 months.   - Acute hypoxic respiratory failure: maintained on high flow nasal canula: pulmonary evaluation in progress.   - Persistent afib with elevated CHADSVASC score maintained off ac per patient's choice.   - Complete heart block s/p PPM: continue follow up with Dr. Lin as scheduled.   - ITZ: continue to monitor per primary team.       Joaquín Castillo M.D.

## 2021-12-15 NOTE — CONSULT NOTE ADULT - SUBJECTIVE AND OBJECTIVE BOX
PULMONARY CONSULT NOTE      SUZY BURRELL-[id number]    Patient is a 89y old  Female who presents with a chief complaint of Acute hypoxemic respiratory failure due to suspected diastolic CHF with acute decompensation (14 Dec 2021 06:24)      HISTORY OF PRESENT ILLNESS: Hx emphysema, COPD, pna. Not in our office since 2018.    Also hx complete heart block s/p PPM (2014), HFpEF (last known EF 50-55%), HTN, chronic pain from severe OA of knees,   HTN, HLD, non-compliant with statin who was sent from PMD office for hypoxia and concern for volume overload.  Pt reports 3-4 days of progressive exertional dyspnea which she reports started after she ate ‘[employer]’ cheeseburger from [employer] a few days ago.  She is on lasix 20mg and reports compliance with her diuretic.  She denies any recent episodes of chest pain. She also denies any recent lightheadedness, syncopal events, orthopnea, worsening leg swelling, fevers, chills or cough.  Pt went to her PMD yesterday where she was found to be markedly dyspnea when moving around in exam room and was noted to be satting 78% on room air. She was placed on 4L nasal cannula at the office and EMS was called who transported the pt to [facility] for further evaluation.    Episode of low BP last pm. Improved.    On HFO2.     Says her sob is improved.     MEDICATIONS  (STANDING):  ascorbic acid 500 milliGRAM(s) Oral daily  aspirin enteric coated 81 milliGRAM(s) Oral daily  cholecalciferol 400 Unit(s) Oral daily  enoxaparin Injectable 40 milliGRAM(s) SubCutaneous daily  furosemide   Injectable 40 milliGRAM(s) IV Push daily  multivitamin 1 Tablet(s) Oral daily  valsartan 160 milliGRAM(s) Oral daily      MEDICATIONS  (PRN):  ALBUTerol    90 MICROgram(s) HFA Inhaler 2 Puff(s) Inhalation every 4 hours PRN Shortness of Breath and/or Wheezing  traMADol 50 milliGRAM(s) Oral daily PRN Moderate to Severe Pain      Allergies    ibuprofen (Rash)  levofloxacin (Hives)    Intolerances        PAST MEDICAL & SURGICAL HISTORY:  Hypertension    Diabetes mellitus  type 2    Carotid artery disease    Cataract    Hernia    COPD (chronic obstructive pulmonary disease)    Dyslipidemia    GERD (gastroesophageal reflux disease)    CHF (congestive heart failure)    BBB (bundle branch block)  left    Pneumonia    Obesity    Hypertension    CAD (coronary artery disease)    Artificial cardiac pacemaker  8/2014    History of tonsillectomy    After cataract, right eye  3/31/2015    Adjustment and management of cardiac pacemaker        FAMILY HISTORY:  Family history of CHF (congestive heart failure)        SOCIAL HISTORY  Smoking History: former smoker quit many years ago    REVIEW OF SYSTEMS:    CONSTITUTIONAL:  No fevers, chills, sweats    HEENT:  Eyes:  No diplopia or blurred vision. ENT:  No earache, sore throat or runny nose.    CARDIOVASCULAR:  per HPI    RESPIRATORY: per HPI      GASTROINTESTINAL:  No abdominal pain, nausea, vomiting or diarrhea.    GENITOURINARY:  No dysuria, frequency or urgency.    NEUROLOGIC:  No paresthesias, fasciculations, seizures or weakness.    PSYCHIATRIC:  No disorder of thought or mood.    Vital Signs Last 24 Hrs  T(C): 36.8 (15 Dec 2021 11:41), Max: 37.3 (15 Dec 2021 05:04)  T(F): 98.2 (15 Dec 2021 11:41), Max: 99.2 (15 Dec 2021 05:04)  HR: 84 (15 Dec 2021 11:41) (40 - 87)  BP: 143/69 (15 Dec 2021 11:41) (71/42 - 153/79)  BP(mean): --  RR: 18 (15 Dec 2021 11:41) (18 - 18)  SpO2: 97% (15 Dec 2021 11:41) (95% - 99%)    PHYSICAL EXAMINATION:    GENERAL: The patient is a  in no apparent distress.     HEENT: Head is normocephalic and atraumatic.  Mucous membranes are moist.     NECK: Supple.     LUNGS: Clear to auscultation without wheezing, rales, or rhonchi. Respirations unlabored    HEART: Regular rate and rhythm     ABDOMEN: Soft, nontender, and nondistended.      EXTREMITIES: Without any cyanosis, clubbing,     NEUROLOGIC: Grossly intact.      LABS:                        11.5   10.80 )-----------( 266      ( 15 Dec 2021 11:25 )             36.3     12-15    141  |  104  |  39.4<H>  ----------------------------<  141<H>  4.0   |  21.0<L>  |  1.46<H>    Ca    8.3<L>      15 Dec 2021 07:11  Phos  3.6     12-15  Mg     2.1     12-15    TPro  7.1  /  Alb  3.6  /  TBili  0.4  /  DBili  x   /  AST  25  /  ALT  12  /  AlkPhos  88  12-13       CARDIAC MARKERS ( 14 Dec 2021 04:21 )  x     / 0.01 ng/mL / 35 U/L / x     / x      CARDIAC MARKERS ( 13 Dec 2021 17:49 )  x     / <0.01 ng/mL / x     / x     / x            Serum Pro-Brain Natriuretic Peptide: 2829 pg/mL (12-13-21 @ 17:49)      Procalcitonin, Serum: 0.15 ng/mL (12-15-21 @ 11:25)      MICROBIOLOGY:  Culture - Urine (12.14.21 @ 06:37)    Specimen Source: Clean Catch Clean Catch (Midstream)    Culture Results:   <10,000 CFU/mL Normal Urogenital [place]    COVID-19 PCR . (12.13.21 @ 20:45)    COVID-19 PCR: NotDetec: You can help in the fight against COVID-19. [facility] may contact  you to see if you are interested in voluntarily participating in one of  our clinical trials.  Testing is performed using polymerase chain reaction (PCR) or  transcription mediated amplification (TMA). This COVID-19 (SARS-CoV-2)  nucleic acid amplification test was validated by [facility] and is  in use under the FDA Emergency Use Authorization (EUA) for clinical labs  CLIA-certified to perform high complexity testing. Test results should be  correlated with clinical presentation, patient history, and epidemiology.        RADIOLOGY & ADDITIONAL STUDIES:  < from: Xray Chest 1 View-PORTABLE IMMEDIATE (Xray Chest 1 View-PORTABLE IMMEDIATE .) (12.14.21 @ 09:50) >    ACC: [id number] EXAM:  XR CHEST PORTABLE URGENT 1V                        ACC: [id number] EXAM:  XR CHEST PORTABLE IMMED 1V                          PROCEDURE DATE:  12/14/2021          INTERPRETATION:  AP chest on December 13, 2021 at 5:07 PM. Patientis   short of breath.    Heart likely enlarged. Left-sided pacemaker again noted.    Present film shows a diffuse interstitial pattern throughout all lung   fields. This could represent slowly progressive chronic interstitial lung   disease. Acute component of disease difficult to exclude. These   interstitial markings are significantly increased from February 24, 2018.    Follow-up AP chest on December 14, 2021 at 9:38 AM. Chest relatively   unchanged.    IMPRESSION: Diffusely increased interstitial markings which could have a   significant chronic component. Acute disease cannot excluded. Stable   cardiac findings.    --- End of Report ---            DOMINIQUE FLORES MD; Attending Radiologist  This document has been electronically signed. Dec [id number] 10:26AM    < end of copied text >  < from: Xray Chest 1 View- PORTABLE-Urgent (12.13.21 @ 17:13) >  ACC: [id number] EXAM:  XR CHEST PORTABLE URGENT 1V                        ACC: [id number] EXAM:  XR CHEST PORTABLE IMMED 1V                          PROCEDURE DATE:  12/14/2021          INTERPRETATION:  AP chest on December 13, 2021 at 5:07 PM. Patientis   short of breath.    Heart likely enlarged. Left-sided pacemaker again noted.    Present film shows a diffuse interstitial pattern throughout all lung   fields. This could represent slowly progressive chronic interstitial lung   disease. Acute component of disease difficult to exclude. These   interstitial markings are significantly increased from February 24, 2018.    Follow-up AP chest on December 14, 2021 at 9:38 AM. Chest relatively   unchanged.    IMPRESSION: Diffusely increased interstitial markings which could have a   significant chronic component. Acute disease cannot excluded. Stable   cardiac findings.    --- End of Report ---            DOMINIQUE FLORES MD; Attending Radiologist  This document has been electronically signed. Dec [id number] 10:26AM    < end of copied text >

## 2021-12-16 LAB
ANION GAP SERPL CALC-SCNC: 13 MMOL/L — SIGNIFICANT CHANGE UP (ref 5–17)
BASOPHILS # BLD AUTO: 0.04 K/UL — SIGNIFICANT CHANGE UP (ref 0–0.2)
BASOPHILS NFR BLD AUTO: 0.4 % — SIGNIFICANT CHANGE UP (ref 0–2)
BUN SERPL-MCNC: 37 MG/DL — HIGH (ref 8–20)
CALCIUM SERPL-MCNC: 8.3 MG/DL — LOW (ref 8.6–10.2)
CHLORIDE SERPL-SCNC: 105 MMOL/L — SIGNIFICANT CHANGE UP (ref 98–107)
CO2 SERPL-SCNC: 26 MMOL/L — SIGNIFICANT CHANGE UP (ref 22–29)
CREAT SERPL-MCNC: 1.19 MG/DL — SIGNIFICANT CHANGE UP (ref 0.5–1.3)
EOSINOPHIL # BLD AUTO: 0.91 K/UL — HIGH (ref 0–0.5)
EOSINOPHIL NFR BLD AUTO: 9.2 % — HIGH (ref 0–6)
GLUCOSE SERPL-MCNC: 134 MG/DL — HIGH (ref 70–99)
HCT VFR BLD CALC: 37.1 % — SIGNIFICANT CHANGE UP (ref 34.5–45)
HGB BLD-MCNC: 11.7 G/DL — SIGNIFICANT CHANGE UP (ref 11.5–15.5)
IMM GRANULOCYTES NFR BLD AUTO: 0.3 % — SIGNIFICANT CHANGE UP (ref 0–1.5)
LYMPHOCYTES # BLD AUTO: 1.7 K/UL — SIGNIFICANT CHANGE UP (ref 1–3.3)
LYMPHOCYTES # BLD AUTO: 17.2 % — SIGNIFICANT CHANGE UP (ref 13–44)
MAGNESIUM SERPL-MCNC: 2.2 MG/DL — SIGNIFICANT CHANGE UP (ref 1.6–2.6)
MCHC RBC-ENTMCNC: 29.1 PG — SIGNIFICANT CHANGE UP (ref 27–34)
MCHC RBC-ENTMCNC: 31.5 GM/DL — LOW (ref 32–36)
MCV RBC AUTO: 92.3 FL — SIGNIFICANT CHANGE UP (ref 80–100)
MONOCYTES # BLD AUTO: 0.86 K/UL — SIGNIFICANT CHANGE UP (ref 0–0.9)
MONOCYTES NFR BLD AUTO: 8.7 % — SIGNIFICANT CHANGE UP (ref 2–14)
NEUTROPHILS # BLD AUTO: 6.36 K/UL — SIGNIFICANT CHANGE UP (ref 1.8–7.4)
NEUTROPHILS NFR BLD AUTO: 64.2 % — SIGNIFICANT CHANGE UP (ref 43–77)
PHOSPHATE SERPL-MCNC: 3.1 MG/DL — SIGNIFICANT CHANGE UP (ref 2.4–4.7)
PLATELET # BLD AUTO: 269 K/UL — SIGNIFICANT CHANGE UP (ref 150–400)
POTASSIUM SERPL-MCNC: 3.8 MMOL/L — SIGNIFICANT CHANGE UP (ref 3.5–5.3)
POTASSIUM SERPL-SCNC: 3.8 MMOL/L — SIGNIFICANT CHANGE UP (ref 3.5–5.3)
RBC # BLD: 4.02 M/UL — SIGNIFICANT CHANGE UP (ref 3.8–5.2)
RBC # FLD: 13.2 % — SIGNIFICANT CHANGE UP (ref 10.3–14.5)
SODIUM SERPL-SCNC: 144 MMOL/L — SIGNIFICANT CHANGE UP (ref 135–145)
WBC # BLD: 9.9 K/UL — SIGNIFICANT CHANGE UP (ref 3.8–10.5)
WBC # FLD AUTO: 9.9 K/UL — SIGNIFICANT CHANGE UP (ref 3.8–10.5)

## 2021-12-16 PROCEDURE — 99233 SBSQ HOSP IP/OBS HIGH 50: CPT

## 2021-12-16 RX ORDER — METOPROLOL TARTRATE 50 MG
12.5 TABLET ORAL
Refills: 0 | Status: DISCONTINUED | OUTPATIENT
Start: 2021-12-16 | End: 2021-12-23

## 2021-12-16 RX ADMIN — Medication 1 TABLET(S): at 12:18

## 2021-12-16 RX ADMIN — Medication 3 MILLILITER(S): at 22:06

## 2021-12-16 RX ADMIN — Medication 12.5 MILLIGRAM(S): at 17:49

## 2021-12-16 RX ADMIN — Medication 3 MILLILITER(S): at 10:33

## 2021-12-16 RX ADMIN — Medication 3 MILLILITER(S): at 04:18

## 2021-12-16 RX ADMIN — Medication 3 MILLILITER(S): at 16:25

## 2021-12-16 RX ADMIN — ENOXAPARIN SODIUM 40 MILLIGRAM(S): 100 INJECTION SUBCUTANEOUS at 12:21

## 2021-12-16 RX ADMIN — Medication 400 UNIT(S): at 12:19

## 2021-12-16 RX ADMIN — Medication 500 MILLIGRAM(S): at 12:20

## 2021-12-16 RX ADMIN — Medication 81 MILLIGRAM(S): at 12:20

## 2021-12-16 NOTE — PROGRESS NOTE ADULT - ASSESSMENT
89yoF hx complete heart block s/p PPM (2014), HFpEF (last known EF 50-55%), HTN, chronic pain from severe OA of knees, COPD, HTN, HLD, non-compliant with statin, presenting with several days of exertional dyspnea after eating fast food, who was sent to hospital from PMD office for hypoxia in their office with concern for acute CHF exacerbation.    Acute hypoxemic respiratory failure likely due to Acute on Chronic diastolic HF    -remains hypoxic on hiflo; wean as tolerated  -ABG reviewed; CT chest pending (delayed as pt remains on hiflo)  -Likely precipitated by recent dietary indiscretions after eating fast food/cheeseburgers  -CXR with pulm edema, BNP ~2000  -Volume status improved s/p IV lasix; appears euvolemic now  -Holding IV diuresis; to restart home PO lasix upon d/c  -TTE reviewed; preserved EF. Discussed with cardio. No indication for ischemic eval.  -D-dimer elevated but WNL when corrected for age.   -LE duplex pending   -Bronchodilators ATC given history of COPD  -Procal 0.15 - only mildly elevated   -Wean supplemental O2 as tolerated   -Cardio and pulm recommendations appreciated    Acute Kidney Injury  -likely due to overdiuresis and renal hypoperfusion  -creatinine peaked at 2.02 and is now downtrending/normalized   -hold lasix and hold ARB  -maintain MAP > 65  -strict I/Os, daily weights  -avoid nephrotoxic agents    Pyuria   -UA dirty but Ucx without growth   -pt asymptomatic, monitor off abx     Hx HTN  -Hypotensive overnight; BP improved  -Hold ARB and lasix as above    Hx HLD  -Per outpatient PMD note, pt declines to use statins    Hx chronic pain due to osteoarthritis  -On tramadol PRN    Hx of CHB s/p PPM  -tele reviewed    DVT ppx - Lovenox SC    Dispo - Wean off hiflo. CT chest and LE duplex. PT evaluation.

## 2021-12-16 NOTE — PROGRESS NOTE ADULT - ASSESSMENT
-CHF, HFpEF  -Pulm HTN likely  -VHD  -Fluid overload; dietary indescretion  -Hx COPD, emphysema  -Hypoxic resp failure; still on HFO2  -renal insufficiency  -obesity  -sob  -elevated d-dimer; normal when adjusted for age; other reasons to explain SOB    RECC:  CT chest is ordered; will do when stable for transport. CXR for now to assess any monteiro. LE doppler; try portable. Diurese. Duoneb to ATC. Titrate O2. Cardiology f/u. DVT proph.

## 2021-12-16 NOTE — PROGRESS NOTE ADULT - SUBJECTIVE AND OBJECTIVE BOX
CC: hypoxia     INTERVAL HPI/OVERNIGHT EVENTS:  Pt resting comfortably in bed on hiflo NC.       ROS:   CONSTITUTIONAL: (-) fever, (-) chills  RESPIRATORY: (-) SOB, (-) cough  CV: (-) chest pain, (-) palpitations  GI: (-) abdominal pain, (-) nausea, (-) vomiting, (-) diarrhea, (-) constipation   NEURO: (-) headache, (-) weakness, (-) visual changes    VITAL SIGNS:  T(C): 36.8 (12-16-21 @ 11:08), Max: 36.8 (12-16-21 @ 11:08)  HR: 77 (12-16-21 @ 16:23) (75 - 91)  BP: 112/73 (12-16-21 @ 11:08) (112/73 - 119/73)  RR: 20 (12-16-21 @ 11:08) (20 - 20)  SpO2: 93% (12-16-21 @ 16:23) (93% - 100%)  Wt(kg): --    PHYSICAL EXAM: refer to attending attestation   GENERAL: Laying comfortably in bed, AOx3, NAD    LABS:                        11.7   9.90  )-----------( 269      ( 16 Dec 2021 07:34 )             37.1     12-16    144  |  105  |  37.0<H>  ----------------------------<  134<H>  3.8   |  26.0  |  1.19    Ca    8.3<L>      16 Dec 2021 07:34  Phos  3.1     12-16  Mg     2.2     12-16        ABG - ( 15 Dec 2021 13:45 )  pH, Arterial: 7.430 pH, Blood: x     /  pCO2: 45    /  pO2: 109   / HCO3: 30    / Base Excess: 5.6   /  SaO2: 99.0               CC: hypoxia     INTERVAL HPI/OVERNIGHT EVENTS:  Pt resting comfortably in bed on hiflo NC. Patient seen and examined. Reports SOB has improving; slowly being weaned from Hiflo.       ROS:   CONSTITUTIONAL: (-) fever, (-) chills  RESPIRATORY: (-) SOB, (-) cough  CV: (-) chest pain, (-) palpitations  GI: (-) abdominal pain, (-) nausea, (-) vomiting, (-) diarrhea, (-) constipation   NEURO: (-) headache, (-) weakness, (-) visual changes    VITAL SIGNS:  T(C): 36.8 (12-16-21 @ 11:08), Max: 36.8 (12-16-21 @ 11:08)  HR: 77 (12-16-21 @ 16:23) (75 - 91)  BP: 112/73 (12-16-21 @ 11:08) (112/73 - 119/73)  RR: 20 (12-16-21 @ 11:08) (20 - 20)  SpO2: 93% (12-16-21 @ 16:23) (93% - 100%)  Wt(kg): --    PHYSICAL EXAM: refer to attending attestation   GENERAL: Laying comfortably in bed, AOx3, NAD    LABS:                        11.7   9.90  )-----------( 269      ( 16 Dec 2021 07:34 )             37.1     12-16    144  |  105  |  37.0<H>  ----------------------------<  134<H>  3.8   |  26.0  |  1.19    Ca    8.3<L>      16 Dec 2021 07:34  Phos  3.1     12-16  Mg     2.2     12-16        ABG - ( 15 Dec 2021 13:45 )  pH, Arterial: 7.430 pH, Blood: x     /  pCO2: 45    /  pO2: 109   / HCO3: 30    / Base Excess: 5.6   /  SaO2: 99.0

## 2021-12-16 NOTE — PROGRESS NOTE ADULT - SUBJECTIVE AND OBJECTIVE BOX
PULMONARY PROGRESS NOTE      SUZY BURRELL-[id number]    Patient is a 89y old  Female who presents with a chief complaint of Acute hypoxemic respiratory failure due to suspected diastolic CHF with acute decompensation (16 Dec 2021 07:51)      INTERVAL HPI/OVERNIGHT EVENTS: She says she is feeling much better. No sob now. No cp.     MEDICATIONS  (STANDING):  albuterol/ipratropium for Nebulization 3 milliLiter(s) Nebulizer every 6 hours  ascorbic acid 500 milliGRAM(s) Oral daily  aspirin enteric coated 81 milliGRAM(s) Oral daily  cholecalciferol 400 Unit(s) Oral daily  enoxaparin Injectable 40 milliGRAM(s) SubCutaneous daily  metoprolol tartrate 12.5 milliGRAM(s) Oral two times a day  multivitamin 1 Tablet(s) Oral daily      MEDICATIONS  (PRN):  ALBUTerol    90 MICROgram(s) HFA Inhaler 2 Puff(s) Inhalation every 4 hours PRN Shortness of Breath and/or Wheezing  traMADol 50 milliGRAM(s) Oral daily PRN Moderate to Severe Pain      Allergies    ibuprofen (Rash)  levofloxacin (Hives)    Intolerances        PAST MEDICAL & SURGICAL HISTORY:  Hypertension    Diabetes mellitus  type 2    Carotid artery disease    Cataract    Hernia    COPD (chronic obstructive pulmonary disease)    Dyslipidemia    GERD (gastroesophageal reflux disease)    CHF (congestive heart failure)    BBB (bundle branch block)  left    Pneumonia    Obesity    Hypertension    CAD (coronary artery disease)    Artificial cardiac pacemaker  8/2014    History of tonsillectomy    After cataract, right eye  3/31/2015    Adjustment and management of cardiac pacemaker          Vital Signs Last 24 Hrs  T(C): 36.8 (16 Dec 2021 11:08), Max: 36.8 (16 Dec 2021 11:08)  T(F): 98.3 (16 Dec 2021 11:08), Max: 98.3 (16 Dec 2021 11:08)  HR: 75 (16 Dec 2021 11:08) (75 - 91)  BP: 112/73 (16 Dec 2021 11:08) (102/68 - 119/73)  BP(mean): --  RR: 20 (16 Dec 2021 11:08) (20 - 20)  SpO2: 100% (16 Dec 2021 11:08) (94% - 100%)    PHYSICAL EXAMINATION:    GENERAL: The patient is awake and alert in no apparent distress.     HEENT: Head is normocephalic and atraumatic. Extraocular muscles are intact. Mucous membranes are moist.    NECK: Supple.    LUNGS: crackles b/l,  respirations unlabored    HEART: Regular rate and rhythm     ABDOMEN: Soft, nontender, obese    EXTREMITIES: no edema.    NEUROLOGIC: Grossly intact.    LABS:                        11.7   9.90  )-----------( 269      ( 16 Dec 2021 07:34 )             37.1     12-16    144  |  105  |  37.0<H>  ----------------------------<  134<H>  3.8   |  26.0  |  1.19    Ca    8.3<L>      16 Dec 2021 07:34  Phos  3.1     12-16  Mg     2.2     12-16          ABG - ( 15 Dec 2021 13:45 )  pH, Arterial: 7.430 pH, Blood: x     /  pCO2: 45    /  pO2: 109   / HCO3: 30    / Base Excess: 5.6   /  SaO2: 99.0               Serum Pro-Brain Natriuretic Peptide: 2829 pg/mL (12-13-21 @ 17:49)      Procalcitonin, Serum: 0.15 ng/mL (12-15-21 @ 11:25)      MICROBIOLOGY:    RADIOLOGY & ADDITIONAL STUDIES:      < from: Xray Chest 1 View-PORTABLE IMMEDIATE (Xray Chest 1 View-PORTABLE IMMEDIATE .) (12.14.21 @ 09:50) >  ACC: [id number] EXAM:  XR CHEST PORTABLE URGENT 1V                        ACC: [id number] EXAM:  XR CHEST PORTABLE IMMED 1V                          PROCEDURE DATE:  12/14/2021          INTERPRETATION:  AP chest on December 13, 2021 at 5:07 PM. Patientis   short of breath.    Heart likely enlarged. Left-sided pacemaker again noted.    Present film shows a diffuse interstitial pattern throughout all lung   fields. This could represent slowly progressive chronic interstitial lung   disease. Acute component of disease difficult to exclude. These   interstitial markings are significantly increased from February 24, 2018.    Follow-up AP chest on December 14, 2021 at 9:38 AM. Chest relatively   unchanged.    IMPRESSION: Diffusely increased interstitial markings which could have a   significant chronic component. Acute disease cannot excluded. Stable   cardiac findings.    --- End of Report ---            DOMINIQUE FLORES MD; Attending Radiologist  This document has been electronically signed. Dec [id number] 10:26AM    < end of copied text >

## 2021-12-16 NOTE — PROGRESS NOTE ADULT - SUBJECTIVE AND OBJECTIVE BOX
[facility], [facility]                                          [address] , [address], [place] [address]                                               PHONE: [phone]    FAX: [phone] [address] [facility], [address], [place] [address]                                       PHONE: [phone]    FAX: [phone]  *******************************************************************************    Overnight events/Subjective Assessment:  Pt reports breathing improving.  FiO2 requirement is decreasing.  No CP or palpitations.  No overnight cardiac events    INTERPRETATION OF TELEMETRY (personally reviewed): SR 70-80s, A-sensing, V-pacing and occasional A-V pacing    ibuprofen (Rash)  levofloxacin (Hives)    MEDICATIONS  (STANDING):  albuterol/ipratropium for Nebulization 3 milliLiter(s) Nebulizer every 6 hours  ascorbic acid 500 milliGRAM(s) Oral daily  aspirin enteric coated 81 milliGRAM(s) Oral daily  cholecalciferol 400 Unit(s) Oral daily  enoxaparin Injectable 40 milliGRAM(s) SubCutaneous daily  multivitamin 1 Tablet(s) Oral daily    MEDICATIONS  (PRN):  ALBUTerol    90 MICROgram(s) HFA Inhaler 2 Puff(s) Inhalation every 4 hours PRN Shortness of Breath and/or Wheezing  traMADol 50 milliGRAM(s) Oral daily PRN Moderate to Severe Pain      Vital Signs Last 24 Hrs  T(C): 36.7 (16 Dec 2021 04:45), Max: 36.8 (15 Dec 2021 11:41)  T(F): 98 (16 Dec 2021 04:45), Max: 98.2 (15 Dec 2021 11:41)  HR: 91 (16 Dec 2021 04:45) (78 - 91)  BP: 119/73 (16 Dec 2021 04:45) (102/68 - 143/69)  BP(mean): --  RR: 20 (16 Dec 2021 04:45) (18 - 20)  SpO2: 96% (16 Dec 2021 04:45) (96% - 100%)    I&O's Detail    15 Dec 2021 07:01  -  16 Dec 2021 07:00  --------------------------------------------------------  IN:    Oral Fluid: 200 mL  Total IN: 200 mL    OUT:  Total OUT: 0 mL    Total NET: 200 mL        I&O's Summary    15 Dec 2021 07:01  -  16 Dec 2021 07:00  --------------------------------------------------------  IN: 200 mL / OUT: 0 mL / NET: 200 mL            PHYSICAL EXAM:  General: Elderly woman, laying flat, comfortably in bed on HiFlow O2 in nad  HEENT: Head: normocephalic, atraumatic  Eyes: Pupils equal and reactive  Neck: Supple, no carotid bruit, no JVD, no HJR  CARDIOVASCULAR: irregular, 2/6 systolic murmur  LUNGS: Clear to auscultation bilaterally, no rales, rhonchi or wheeze  ABDOMEN: Soft, nontender, non-distended, positive bowel sounds, no mass or bruit  EXTREMITIES: No edema, chronic skin changes  SKIN: Warm and dry with normal turgor  NEURO: Alert & oriented x 3, grossly intact  PSYCH: normal mood and affect    LABS:                        11.5   10.80 )-----------( 266      ( 15 Dec 2021 11:25 )             36.3     12-15    141  |  104  |  39.4<H>  ----------------------------<  141<H>  4.0   |  21.0<L>  |  1.46<H>    Ca    8.3<L>      15 Dec 2021 07:11  Phos  3.6     12-15  Mg     2.1     12-15            Serum Pro-Brain Natriuretic Peptide: 2829 pg/mL (12-13 @ 17:49)  serum  Lipids:       < from: TTE Echo Complete w/ Contrast w/ Doppler (12.14.21 @ 20:44) >  PHYSICIAN INTERPRETATION:  Left Ventricle: The left ventricular internal cavity size is normal.  Global LV systolic function was hyperdynamic. Left ventricular ejection   fraction, by visual estimation, is >75%. Abnormal (paradoxical) septal   motion, consistent with RV pacemaker. The mitral in-flow pattern reveals   no discernable A-wave, therefore no comment on diastolic function can be   made.      LV Wall Scoring:  The apical septal segment and apex are hypokinetic.    Right Ventricle: Normal right ventricular size and function.  Left Atrium: Severely enlarged left atrium.  Right Atrium: Mildly enlarged right atrium.  Pericardium: There is no evidence of pericardial effusion.  Mitral Valve: There is moderate mitral annular calcification.  Aortic Valve: Moderate aortic stenosis is present. The peak aortic   velocity was obtained from the right parasternal view. Trivial aortic   valve regurgitation is seen. Based on DI and Mean gradient Aortic   Stenosis appears to be Moderate Aortic Stenosis. The Dimesionless Index   value is .29.  Pulmonic Valve: Trace pulmonic valve regurgitation.  Aorta: The aortic root is normal in size and structure.  Pulmonary Artery: The pulmonary artery is severely dilated.  Additional Comments: A pacer wire is visualized in the right atrium and   right ventricle.      Summary:   1. Left ventricular ejection fraction, by visual estimation, is >75%.   2. Hyperdynamic global left ventricular systolic function.   3. Severely enlarged left atrium.   4. Apical septal segment and apex are abnormal as described above.   5. Abnormal septal motion consistent with RV pacemaker.   6. Normal left ventricular internal cavity size.   7. The mitral in-flow pattern reveals no discernable A-wave, therefore   no comment on diastolic function can be made.   8. Normal right ventricular size and function.   9. Mildly enlarged right atrium.  10. There is no evidence of pericardial effusion.  11. Moderate mitral annular calcification.  12. Moderate aortic valve stenosis.  13. Based on DI and Mean gradient Aortic Stenosis appears to be Moderate   Aortic Stenosis.  14. Severely dilated pulmonary artery.  15. The Dimesionless Index value is .29.    MD Mary Electronically signed on 12/15/2021 at 9:37:21 AM    < end of copied text >    ASSESSMENT AND PLAN:  In summary, MANUEL BURRELL is a 89y woman with HTN, chronic diastolic HF, persistent afib (off ac), complete heart block s/p PPM (2014), mod aortic stenosis, and mild-moderate b/l internal carotid artery stenoses, admitted for evaluation of acute hypoxic respiratory failure.     - The patient has been chest pain free since admission with negative serial troponin and has ruled out for acute MI.   - Mild acute on chronic diastolic HF: clinically does not appear to be in decompensated HF at this time.  Would hold diuresis particularly given ITZ which appears to be improving.    - Echocardiogram (12/2021) reviewed: normal LV size with hyperdynamic LV systolic function (LVEF >75%) with apical RWMA; likely restrictive diastolic filling pattern; severely dilated LA; mildly dilated RA; normal RV size and systolic function; moderate aortic stenosis; pulmonary artery severely dilated.  - HTN: BP low normal since admission: would hold ARB particularly in light of ITZ.   Will start Lopressor 12.5mg BID (with hold parameters) given hyperdynamic LV  - Moderate aortic stenosis: plan for repeat echocardiogram in 6 months.   - Acute hypoxic respiratory failure: maintained on high flow nasal canula: pulmonary evaluation in progress.   - Persistent afib with elevated CHADSVASC score maintained off AC per patient's choice.   - Complete heart block s/p PPM: continue follow up with Dr. Lin as scheduled.   - ITZ: continue to monitor per primary team.  Would restart home dose of lasix 20mg PO daily when renal function normalizes  - Titrate down Hiflow to NC/venti mask as possible.  - d/w Dr. Zenaida Solitario MD                                                      [facility], [facility]                                          [address] , [address], [place] [address]                                               PHONE: [phone]    FAX: [phone] [address] [facility], [address], [place] [address]                                       PHONE: [phone]    FAX: [phone]  *******************************************************************************    Overnight events/Subjective Assessment:  Pt reports breathing improving.  FiO2 requirement is decreasing.  No CP or palpitations.  No overnight cardiac events    INTERPRETATION OF TELEMETRY (personally reviewed): SR 70-80s, A-sensing, V-pacing and occasional A-V pacing    ibuprofen (Rash)  levofloxacin (Hives)    MEDICATIONS  (STANDING):  albuterol/ipratropium for Nebulization 3 milliLiter(s) Nebulizer every 6 hours  ascorbic acid 500 milliGRAM(s) Oral daily  aspirin enteric coated 81 milliGRAM(s) Oral daily  cholecalciferol 400 Unit(s) Oral daily  enoxaparin Injectable 40 milliGRAM(s) SubCutaneous daily  multivitamin 1 Tablet(s) Oral daily    MEDICATIONS  (PRN):  ALBUTerol    90 MICROgram(s) HFA Inhaler 2 Puff(s) Inhalation every 4 hours PRN Shortness of Breath and/or Wheezing  traMADol 50 milliGRAM(s) Oral daily PRN Moderate to Severe Pain      Vital Signs Last 24 Hrs  T(C): 36.7 (16 Dec 2021 04:45), Max: 36.8 (15 Dec 2021 11:41)  T(F): 98 (16 Dec 2021 04:45), Max: 98.2 (15 Dec 2021 11:41)  HR: 91 (16 Dec 2021 04:45) (78 - 91)  BP: 119/73 (16 Dec 2021 04:45) (102/68 - 143/69)  BP(mean): --  RR: 20 (16 Dec 2021 04:45) (18 - 20)  SpO2: 96% (16 Dec 2021 04:45) (96% - 100%)    I&O's Detail    15 Dec 2021 07:01  -  16 Dec 2021 07:00  --------------------------------------------------------  IN:    Oral Fluid: 200 mL  Total IN: 200 mL    OUT:  Total OUT: 0 mL    Total NET: 200 mL        I&O's Summary    15 Dec 2021 07:01  -  16 Dec 2021 07:00  --------------------------------------------------------  IN: 200 mL / OUT: 0 mL / NET: 200 mL            PHYSICAL EXAM:  General: Elderly woman, laying flat, comfortably in bed on HiFlow O2 in nad  HEENT: Head: normocephalic, atraumatic  Eyes: Pupils equal and reactive  Neck: Supple, no carotid bruit, no JVD, no HJR  CARDIOVASCULAR: irregular, 2/6 systolic murmur  LUNGS: Clear to auscultation bilaterally, no rales, rhonchi or wheeze  ABDOMEN: Soft, nontender, non-distended, positive bowel sounds, no mass or bruit  EXTREMITIES: No edema, chronic skin changes  SKIN: Warm and dry with normal turgor  NEURO: Alert & oriented x 3, grossly intact  PSYCH: normal mood and affect    LABS:                        11.5   10.80 )-----------( 266      ( 15 Dec 2021 11:25 )             36.3     12-15    141  |  104  |  39.4<H>  ----------------------------<  141<H>  4.0   |  21.0<L>  |  1.46<H>    Ca    8.3<L>      15 Dec 2021 07:11  Phos  3.6     12-15  Mg     2.1     12-15            Serum Pro-Brain Natriuretic Peptide: 2829 pg/mL (12-13 @ 17:49)  serum  Lipids:       < from: TTE Echo Complete w/ Contrast w/ Doppler (12.14.21 @ 20:44) >  PHYSICIAN INTERPRETATION:  Left Ventricle: The left ventricular internal cavity size is normal.  Global LV systolic function was hyperdynamic. Left ventricular ejection   fraction, by visual estimation, is >75%. Abnormal (paradoxical) septal   motion, consistent with RV pacemaker. The mitral in-flow pattern reveals   no discernable A-wave, therefore no comment on diastolic function can be   made.      LV Wall Scoring:  The apical septal segment and apex are hypokinetic.    Right Ventricle: Normal right ventricular size and function.  Left Atrium: Severely enlarged left atrium.  Right Atrium: Mildly enlarged right atrium.  Pericardium: There is no evidence of pericardial effusion.  Mitral Valve: There is moderate mitral annular calcification.  Aortic Valve: Moderate aortic stenosis is present. The peak aortic   velocity was obtained from the right parasternal view. Trivial aortic   valve regurgitation is seen. Based on DI and Mean gradient Aortic   Stenosis appears to be Moderate Aortic Stenosis. The Dimesionless Index   value is .29.  Pulmonic Valve: Trace pulmonic valve regurgitation.  Aorta: The aortic root is normal in size and structure.  Pulmonary Artery: The pulmonary artery is severely dilated.  Additional Comments: A pacer wire is visualized in the right atrium and   right ventricle.      Summary:   1. Left ventricular ejection fraction, by visual estimation, is >75%.   2. Hyperdynamic global left ventricular systolic function.   3. Severely enlarged left atrium.   4. Apical septal segment and apex are abnormal as described above.   5. Abnormal septal motion consistent with RV pacemaker.   6. Normal left ventricular internal cavity size.   7. The mitral in-flow pattern reveals no discernable A-wave, therefore   no comment on diastolic function can be made.   8. Normal right ventricular size and function.   9. Mildly enlarged right atrium.  10. There is no evidence of pericardial effusion.  11. Moderate mitral annular calcification.  12. Moderate aortic valve stenosis.  13. Based on DI and Mean gradient Aortic Stenosis appears to be Moderate   Aortic Stenosis.  14. Severely dilated pulmonary artery.  15. The Dimesionless Index value is .29.    MD Mary Electronically signed on 12/15/2021 at 9:37:21 AM    < end of copied text >    ASSESSMENT AND PLAN:  In summary, MANUEL BURRELL is a 89y woman with HTN, chronic diastolic HF, persistent afib (off ac), complete heart block s/p PPM (2014), mod aortic stenosis, and mild-moderate b/l internal carotid artery stenoses, admitted for evaluation of acute hypoxic respiratory failure.     - The patient has been chest pain free since admission with negative serial troponin and has ruled out for acute MI.   - Mild acute on chronic diastolic HF: clinically does not appear to be in decompensated HF at this time.  Would hold diuresis particularly given ITZ which appears to be improving.    - Echocardiogram (12/2021) reviewed: normal LV size with hyperdynamic LV systolic function (LVEF >75%) with apical RWMA; likely restrictive diastolic filling pattern; severely dilated LA; mildly dilated RA; normal RV size and systolic function; moderate aortic stenosis; pulmonary artery severely dilated.  - HTN: BP low normal since admission: would hold ARB particularly in light of ITZ.   Will start Lopressor 12.5mg BID (with hold parameters) given hyperdynamic LV  - Moderate aortic stenosis: plan for repeat echocardiogram in 6 months.   - Acute hypoxic respiratory failure: maintained on high flow nasal canula: pulmonary evaluation in progress.   - Persistent afib with elevated CHADSVASC score maintained off AC per patient's choice.   - Complete heart block s/p PPM: continue follow up with Dr. Lin as scheduled.   - ITZ: continue to monitor per primary team.  Would restart home dose of lasix 20mg PO daily when renal function normalizes  - Titrate down Hiflow to NC/venti mask as possible.  - d/w Dr. Zenaida Solitario MD     ADDENDUM: 3:24PM  [employer] PPM was interrogated this AM.  Normal function.  She had AF yesterday and remote short NSVT.  Currently having PACs.  Settings adjusted to optimize pacing.  d/w Dr. Estevez.  Tele will be discontinued.  We will follow as needed.  Call with CV questions or concerns.

## 2021-12-16 NOTE — PROGRESS NOTE ADULT - ATTENDING COMMENTS
I have seen and examined the patient and agree with the above assessment and plan. No overnight events. Patient reports feeling less SOB today. Hiflo slowly being weaned. CT chest once off Hiflo to rule out pulmonary component of SOB. Continue duonebs ATC per pulm. PT evaluation for dispo. Remains acute. Rest of the management as outlined above.    PHYSICAL EXAM:    GENERAL: elderly female, laying in bed, NAD  HEAD:  Atraumatic, Normocephalic  EYES: EOMI, PERRLA, conjunctiva and sclera clear  ENMT: Moist mucous membranes  NECK: Supple   NERVOUS SYSTEM:  Alert & Oriented X3   CHEST/LUNG: coarse breath sounds  HEART: Regular rate and rhythm; + S1/S2  ABDOMEN: Soft, Nontender, Nondistended; Bowel sounds present, + abdominal hernia  EXTREMITIES:  no pedal edema    Plan discussed with patient, Walter Golden [phone], Dr. Solitario, medicine PA, RN

## 2021-12-17 LAB
ANION GAP SERPL CALC-SCNC: 11 MMOL/L — SIGNIFICANT CHANGE UP (ref 5–17)
BASOPHILS # BLD AUTO: 0.05 K/UL — SIGNIFICANT CHANGE UP (ref 0–0.2)
BASOPHILS NFR BLD AUTO: 0.5 % — SIGNIFICANT CHANGE UP (ref 0–2)
BUN SERPL-MCNC: 35.9 MG/DL — HIGH (ref 8–20)
CALCIUM SERPL-MCNC: 8.6 MG/DL — SIGNIFICANT CHANGE UP (ref 8.6–10.2)
CHLORIDE SERPL-SCNC: 104 MMOL/L — SIGNIFICANT CHANGE UP (ref 98–107)
CO2 SERPL-SCNC: 26 MMOL/L — SIGNIFICANT CHANGE UP (ref 22–29)
CREAT SERPL-MCNC: 1.14 MG/DL — SIGNIFICANT CHANGE UP (ref 0.5–1.3)
EOSINOPHIL # BLD AUTO: 1.1 K/UL — HIGH (ref 0–0.5)
EOSINOPHIL NFR BLD AUTO: 11.8 % — HIGH (ref 0–6)
GLUCOSE SERPL-MCNC: 132 MG/DL — HIGH (ref 70–99)
HCT VFR BLD CALC: 35.7 % — SIGNIFICANT CHANGE UP (ref 34.5–45)
HGB BLD-MCNC: 11.1 G/DL — LOW (ref 11.5–15.5)
IMM GRANULOCYTES NFR BLD AUTO: 0.2 % — SIGNIFICANT CHANGE UP (ref 0–1.5)
LYMPHOCYTES # BLD AUTO: 2.08 K/UL — SIGNIFICANT CHANGE UP (ref 1–3.3)
LYMPHOCYTES # BLD AUTO: 22.3 % — SIGNIFICANT CHANGE UP (ref 13–44)
MAGNESIUM SERPL-MCNC: 2.1 MG/DL — SIGNIFICANT CHANGE UP (ref 1.6–2.6)
MCHC RBC-ENTMCNC: 28.4 PG — SIGNIFICANT CHANGE UP (ref 27–34)
MCHC RBC-ENTMCNC: 31.1 GM/DL — LOW (ref 32–36)
MCV RBC AUTO: 91.3 FL — SIGNIFICANT CHANGE UP (ref 80–100)
MONOCYTES # BLD AUTO: 0.85 K/UL — SIGNIFICANT CHANGE UP (ref 0–0.9)
MONOCYTES NFR BLD AUTO: 9.1 % — SIGNIFICANT CHANGE UP (ref 2–14)
NEUTROPHILS # BLD AUTO: 5.21 K/UL — SIGNIFICANT CHANGE UP (ref 1.8–7.4)
NEUTROPHILS NFR BLD AUTO: 56.1 % — SIGNIFICANT CHANGE UP (ref 43–77)
PHOSPHATE SERPL-MCNC: 2.7 MG/DL — SIGNIFICANT CHANGE UP (ref 2.4–4.7)
PLATELET # BLD AUTO: 269 K/UL — SIGNIFICANT CHANGE UP (ref 150–400)
POTASSIUM SERPL-MCNC: 4 MMOL/L — SIGNIFICANT CHANGE UP (ref 3.5–5.3)
POTASSIUM SERPL-SCNC: 4 MMOL/L — SIGNIFICANT CHANGE UP (ref 3.5–5.3)
RBC # BLD: 3.91 M/UL — SIGNIFICANT CHANGE UP (ref 3.8–5.2)
RBC # FLD: 13.2 % — SIGNIFICANT CHANGE UP (ref 10.3–14.5)
SODIUM SERPL-SCNC: 141 MMOL/L — SIGNIFICANT CHANGE UP (ref 135–145)
WBC # BLD: 9.31 K/UL — SIGNIFICANT CHANGE UP (ref 3.8–10.5)
WBC # FLD AUTO: 9.31 K/UL — SIGNIFICANT CHANGE UP (ref 3.8–10.5)

## 2021-12-17 PROCEDURE — 99233 SBSQ HOSP IP/OBS HIGH 50: CPT

## 2021-12-17 PROCEDURE — 99232 SBSQ HOSP IP/OBS MODERATE 35: CPT

## 2021-12-17 RX ORDER — NYSTATIN CREAM 100000 [USP'U]/G
1 CREAM TOPICAL
Refills: 0 | Status: DISCONTINUED | OUTPATIENT
Start: 2021-12-17 | End: 2021-12-23

## 2021-12-17 RX ADMIN — Medication 12.5 MILLIGRAM(S): at 05:43

## 2021-12-17 RX ADMIN — Medication 400 UNIT(S): at 12:35

## 2021-12-17 RX ADMIN — Medication 3 MILLILITER(S): at 03:56

## 2021-12-17 RX ADMIN — Medication 1 TABLET(S): at 12:34

## 2021-12-17 RX ADMIN — Medication 3 MILLILITER(S): at 08:29

## 2021-12-17 RX ADMIN — Medication 12.5 MILLIGRAM(S): at 17:38

## 2021-12-17 RX ADMIN — ENOXAPARIN SODIUM 40 MILLIGRAM(S): 100 INJECTION SUBCUTANEOUS at 12:37

## 2021-12-17 RX ADMIN — NYSTATIN CREAM 1 APPLICATION(S): 100000 CREAM TOPICAL at 19:47

## 2021-12-17 RX ADMIN — Medication 3 MILLILITER(S): at 21:25

## 2021-12-17 RX ADMIN — Medication 500 MILLIGRAM(S): at 12:33

## 2021-12-17 RX ADMIN — Medication 3 MILLILITER(S): at 14:41

## 2021-12-17 RX ADMIN — Medication 81 MILLIGRAM(S): at 12:35

## 2021-12-17 NOTE — PHYSICAL THERAPY INITIAL EVALUATION ADULT - LIVES WITH, PROFILE
resides in the apartment by herself attached to the house where her Family resides and available to assist as needed as per pt

## 2021-12-17 NOTE — PHYSICAL THERAPY INITIAL EVALUATION ADULT - DIAGNOSIS, PT EVAL
Impaired Functional Mobility due to generalized weakness, (+) Acute hypoxemic respiratory failure due to suspected diastolic CHF with acute decompensation.

## 2021-12-17 NOTE — PHYSICAL THERAPY INITIAL EVALUATION ADULT - PERTINENT HX OF CURRENT PROBLEM, REHAB EVAL
in MD office was found to be markedly dyspnea when moving around in exam room and was noted to be satting 78% on room air. She was placed on 4L nasal cannula at the office and EMS was called who transported the pt to [facility] for further evaluation

## 2021-12-17 NOTE — PROGRESS NOTE ADULT - SUBJECTIVE AND OBJECTIVE BOX
PULMONARY PROGRESS NOTE      MANUEL BURRELL  MRN-[id number]    Patient is a 89y old  Female who presents with a chief complaint of Acute hypoxemic respiratory failure due to suspected diastolic CHF with acute decompensation (17 Dec 2021 14:31)        INTERVAL HPI/OVERNIGHT EVENTS:  Pt seen and examined at the bedside. She reports feeling better.     MEDICATIONS  (STANDING):  albuterol/ipratropium for Nebulization 3 milliLiter(s) Nebulizer every 6 hours  ascorbic acid 500 milliGRAM(s) Oral daily  aspirin enteric coated 81 milliGRAM(s) Oral daily  cholecalciferol 400 Unit(s) Oral daily  enoxaparin Injectable 40 milliGRAM(s) SubCutaneous daily  metoprolol tartrate 12.5 milliGRAM(s) Oral two times a day  multivitamin 1 Tablet(s) Oral daily  nystatin Powder 1 Application(s) Topical two times a day    MEDICATIONS  (PRN):  ALBUTerol    90 MICROgram(s) HFA Inhaler 2 Puff(s) Inhalation every 4 hours PRN Shortness of Breath and/or Wheezing  traMADol 50 milliGRAM(s) Oral daily PRN Moderate to Severe Pain    Allergies    ibuprofen (Rash)  levofloxacin (Hives)    Intolerances      PAST MEDICAL & SURGICAL HISTORY:  Hypertension    Diabetes mellitus  type 2    Carotid artery disease    Cataract    Hernia    COPD (chronic obstructive pulmonary disease)    Dyslipidemia    GERD (gastroesophageal reflux disease)    CHF (congestive heart failure)    BBB (bundle branch block)  left    Pneumonia    Obesity    Hypertension    CAD (coronary artery disease)    Artificial cardiac pacemaker  8/2014    History of tonsillectomy    After cataract, right eye  3/31/2015    Adjustment and management of cardiac pacemaker          REVIEW OF SYSTEMS:  Negative except as noted in HPI      Vital Signs Last 24 Hrs  T(C): 36.7 (17 Dec 2021 10:31), Max: 36.8 (16 Dec 2021 17:02)  T(F): 98 (17 Dec 2021 10:31), Max: 98.3 (17 Dec 2021 04:22)  HR: 91 (17 Dec 2021 10:31) (67 - 91)  BP: 104/64 (17 Dec 2021 10:31) (104/64 - 140/81)  BP(mean): --  RR: 19 (17 Dec 2021 10:31) (19 - 20)  SpO2: 90% (17 Dec 2021 10:31) (90% - 97%)      PHYSICAL EXAMINATION:  GEN: In no apparent distress  HEENT: NC/AT  NECK: Supple  CV: +S1, S2  RESPIRATORY: Bibasilar crackles, good inspiratory effort, non-labored breathing  ABDOMEN: Soft, non-tender  EXTREMITIES: No rashes, lesions, or pitting edema noted  NEURO: Grossly non-focal  PSYCH: Normal affect      LABS:                        11.1   9.31  )-----------( 269      ( 17 Dec 2021 06:22 )             35.7     12-17    141  |  104  |  35.9<H>  ----------------------------<  132<H>  4.0   |  26.0  |  1.14    Ca    8.6      17 Dec 2021 06:22  Phos  2.7     12-17  Mg     2.1     12-17                      Procalcitonin, Serum: 0.15 ng/mL (12-15-21 @ 11:25)      MICROBIOLOGY:      RADIOLOGY & ADDITIONAL STUDIES:  < from: Xray Chest 1 View-PORTABLE IMMEDIATE (Xray Chest 1 View-PORTABLE IMMEDIATE .) (12.14.21 @ 09:50) >  ACC: [id number] EXAM:  XR CHEST PORTABLE URGENT 1V                        ACC: [id number] EXAM:  XR CHEST PORTABLE IMMED 1V                          PROCEDURE DATE:  12/14/2021          INTERPRETATION:  AP chest on December 13, 2021 at 5:07 PM. Patientis   short of breath.    Heart likely enlarged. Left-sided pacemaker again noted.    Present film shows a diffuse interstitial pattern throughout all lung   fields. This could represent slowly progressive chronic interstitial lung   disease. Acute component of disease difficult to exclude. These   interstitial markings are significantly increased from February 24, 2018.    Follow-up AP chest on December 14, 2021 at 9:38 AM. Chest relatively   unchanged.    IMPRESSION: Diffusely increased interstitial markings which could have a   significant chronic component. Acute disease cannot excluded. Stable   cardiac findings.    --- End of Report ---            DOMINIQUE FLORES MD; Attending Radiologist  This document has been electronically signed. Dec 892649 10:26AM    < end of copied text >      ECHO:  < from: TTE Echo Complete w/ Contrast w/ Doppler (12.14.21 @ 20:44) >  Summary:   1. Left ventricular ejection fraction, by visual estimation, is >75%.   2. Hyperdynamic global left ventricular systolic function.   3. Severely enlarged left atrium.   4. Apical septal segment and apex are abnormal as described above.   5. Abnormal septal motion consistent with RV pacemaker.   6. Normal left ventricular internal cavity size.   7. The mitral in-flow pattern reveals no discernable A-wave, therefore   no comment on diastolic function can be made.   8. Normal right ventricular size and function.   9. Mildly enlarged right atrium.  10. There is no evidence of pericardial effusion.  11. Moderate mitral annular calcification.  12. Moderate aortic valve stenosis.  13. Based on DI and Mean gradient Aortic Stenosis appears to be Moderate   Aortic Stenosis.  14. Severely dilated pulmonary artery.  15. The Dimesionless Index value is .29.    MD Mary Electronically signed on 12/15/2021 at 9:37:21 AM            *** Final ***              ZACHARY WIGGINS MD; Attending Cardiologist  This document has been electronically signed. Dec 14 2021  8:44PM    < end of copied text >

## 2021-12-17 NOTE — PROGRESS NOTE ADULT - ASSESSMENT
-HFpEF  -Pulm HTN likely  -VHD  -Fluid overload; dietary indiscretion  -Hx COPD, emphysema  -Hypoxic resp failure  -ITZ - improved  -Obesity  -Elevated d-dimer; normal when adjusted for age; other reasons to explain SOB    Recommendations:  F/u CT chest, LE dopplers. Ernestobs. She is off HFNC, now on NC.  Cardiology f/u. She is s/p IV diuresis. DVT proph.    Elijah Cummins M.D.  Pulmonary & Critical Care Medicine  [facility]  [facility]ulmonary [facility] at [place]  [address]  [place] [address]  T: [phone]  F: [phone]

## 2021-12-17 NOTE — PROGRESS NOTE ADULT - ASSESSMENT
89yoF hx complete heart block s/p PPM (2014), HFpEF (last known EF 50-55%), HTN, chronic pain from severe OA of knees, COPD, HTN, HLD, non-compliant with statin, presenting with several days of exertional dyspnea after eating fast food, who was sent to hospital from PMD office for hypoxia in their office with concern for acute CHF exacerbation.    Acute hypoxemic respiratory failure likely due to Acute on Chronic diastolic HF    -remains hypoxic but weaned off hiflo; continue to wean O2 as tolerated  -ABG reviewed; CT chest pending (delayed as pt remains on hiflo)  -Likely precipitated by recent dietary indiscretions after eating fast food/cheeseburgers  -CXR with pulm edema, BNP ~2000  -Volume status improved s/p IV lasix; appears euvolemic now  -Holding IV diuresis; to restart home PO lasix upon d/c  -TTE reviewed; preserved EF. Discussed with cardio. No indication for ischemic eval.  -D-dimer elevated but WNL when corrected for age.   -LE duplex pending   -Bronchodilators ATC given history of COPD  -Procal 0.15 - only mildly elevated   -Wean supplemental O2 as tolerated   -f/u further Cardio and pulm recommendations   -Transition to PO lasix prior to discharge    Acute Kidney Injury  -likely due to overdiuresis and renal hypoperfusion  -creatinine peaked at 2.02 and is now 1.14  -hold lasix and hold ARB  -maintain MAP > 65  -strict I/Os, daily weights  -avoid nephrotoxic agents    Pyuria   -UA dirty but Ucx without growth   -pt asymptomatic, monitor off abx     Hx HTN  -Hypotensive overnight; BP improved  -Hold ARB and lasix as above    Hx HLD  -Per outpatient PMD note, pt declines to use statins    Hx chronic pain due to osteoarthritis  -On tramadol PRN    Hx of CHB s/p PPM  -PPM interrogated  -tele discontinued    DVT ppx - Lovenox SC    Dispo - Weaned off Hiflo. CT chest and LE duplex. PT follow up to determine ultimate dispo. Probable discharge in 24-48 hours pending clinical course.    Plan discussed with patient, medicine PA, RN

## 2021-12-17 NOTE — PHYSICAL THERAPY INITIAL EVALUATION ADULT - DISCHARGE DISPOSITION, PT EVAL
pending progress, vs Subacute Rehab, rehab disposition recommendation may be change base on patient  functional progress/home w/ assist/home w/ home PT

## 2021-12-17 NOTE — PHYSICAL THERAPY INITIAL EVALUATION ADULT - IMPAIRMENTS CONTRIBUTING TO GAIT DEVIATIONS, PT EVAL
5 l O2 via NC at rest O2 saturation 92%, s/p ambulation 92%, pt needed to be encourage to the proper breathing/impaired balance/decreased flexibility/decreased strength

## 2021-12-17 NOTE — PHYSICAL THERAPY INITIAL EVALUATION ADULT - ADDITIONAL COMMENTS
as per pt: resides in the apartment, with small step through the door to enter, ambulates with rollator, owns transporter W/C, Family available to assist as needed, denies Hx of falls

## 2021-12-17 NOTE — PROGRESS NOTE ADULT - SUBJECTIVE AND OBJECTIVE BOX
CHIEF COMPLAINT/INTERVAL HISTORY:    Patient is a 89y old  Female who presents with a chief complaint of Acute hypoxemic respiratory failure due to suspected diastolic CHF with acute decompensation (16 Dec 2021 17:00)    SUBJECTIVE & OBJECTIVE: Pt seen and examined at bedside. No overnight events. Patient reports feeling better. Weaned off of hiflo. Pending CT chest pain and LE duplex.    ROS: No chest pain, palpitations, light headedness, dizziness, headache, nausea/vomiting, fevers/chills, abdominal pain, dysuria .    ICU Vital Signs Last 24 Hrs  T(C): 36.7 (17 Dec 2021 10:31), Max: 36.8 (16 Dec 2021 17:02)  T(F): 98 (17 Dec 2021 10:31), Max: 98.3 (17 Dec 2021 04:22)  HR: 91 (17 Dec 2021 10:31) (67 - 91)  BP: 104/64 (17 Dec 2021 10:31) (104/64 - 140/81)  RR: 19 (17 Dec 2021 10:31) (19 - 20)  SpO2: 90% (17 Dec 2021 10:31) (90% - 97%)    MEDICATIONS  (STANDING):  albuterol/ipratropium for Nebulization 3 milliLiter(s) Nebulizer every 6 hours  ascorbic acid 500 milliGRAM(s) Oral daily  aspirin enteric coated 81 milliGRAM(s) Oral daily  cholecalciferol 400 Unit(s) Oral daily  enoxaparin Injectable 40 milliGRAM(s) SubCutaneous daily  metoprolol tartrate 12.5 milliGRAM(s) Oral two times a day  multivitamin 1 Tablet(s) Oral daily  nystatin Powder 1 Application(s) Topical two times a day    MEDICATIONS  (PRN):  ALBUTerol    90 MICROgram(s) HFA Inhaler 2 Puff(s) Inhalation every 4 hours PRN Shortness of Breath and/or Wheezing  traMADol 50 milliGRAM(s) Oral daily PRN Moderate to Severe Pain      LABS:                        11.1   9.31  )-----------( 269      ( 17 Dec 2021 06:22 )             35.7     12-17    141  |  104  |  35.9<H>  ----------------------------<  132<H>  4.0   |  26.0  |  1.14    Ca    8.6      17 Dec 2021 06:22  Phos  2.7     12-17  Mg     2.1     12-17      PHYSICAL EXAM:    GENERAL: elderly female, laying in bed, NAD  HEAD:  Atraumatic, Normocephalic  EYES: EOMI, PERRLA, conjunctiva and sclera clear  ENMT: Moist mucous membranes  NECK: Supple   NERVOUS SYSTEM:  Alert & Oriented X3   CHEST/LUNG: coarse breath sounds  HEART: Regular rate and rhythm; + S1/S2  ABDOMEN: Soft, Nontender, Nondistended; Bowel sounds present, + abdominal hernia  EXTREMITIES:  no pedal edema

## 2021-12-18 LAB
ANION GAP SERPL CALC-SCNC: 12 MMOL/L — SIGNIFICANT CHANGE UP (ref 5–17)
BASOPHILS # BLD AUTO: 0.03 K/UL — SIGNIFICANT CHANGE UP (ref 0–0.2)
BASOPHILS NFR BLD AUTO: 0.4 % — SIGNIFICANT CHANGE UP (ref 0–2)
BUN SERPL-MCNC: 35.7 MG/DL — HIGH (ref 8–20)
CALCIUM SERPL-MCNC: 8.5 MG/DL — LOW (ref 8.6–10.2)
CHLORIDE SERPL-SCNC: 106 MMOL/L — SIGNIFICANT CHANGE UP (ref 98–107)
CO2 SERPL-SCNC: 25 MMOL/L — SIGNIFICANT CHANGE UP (ref 22–29)
CREAT SERPL-MCNC: 1.02 MG/DL — SIGNIFICANT CHANGE UP (ref 0.5–1.3)
CULTURE RESULTS: SIGNIFICANT CHANGE UP
EOSINOPHIL # BLD AUTO: 1 K/UL — HIGH (ref 0–0.5)
EOSINOPHIL NFR BLD AUTO: 12.5 % — HIGH (ref 0–6)
GLUCOSE SERPL-MCNC: 134 MG/DL — HIGH (ref 70–99)
HCT VFR BLD CALC: 34.8 % — SIGNIFICANT CHANGE UP (ref 34.5–45)
HGB BLD-MCNC: 11 G/DL — LOW (ref 11.5–15.5)
IMM GRANULOCYTES NFR BLD AUTO: 0.3 % — SIGNIFICANT CHANGE UP (ref 0–1.5)
LYMPHOCYTES # BLD AUTO: 1.64 K/UL — SIGNIFICANT CHANGE UP (ref 1–3.3)
LYMPHOCYTES # BLD AUTO: 20.6 % — SIGNIFICANT CHANGE UP (ref 13–44)
MAGNESIUM SERPL-MCNC: 2.1 MG/DL — SIGNIFICANT CHANGE UP (ref 1.6–2.6)
MCHC RBC-ENTMCNC: 28.8 PG — SIGNIFICANT CHANGE UP (ref 27–34)
MCHC RBC-ENTMCNC: 31.6 GM/DL — LOW (ref 32–36)
MCV RBC AUTO: 91.1 FL — SIGNIFICANT CHANGE UP (ref 80–100)
MONOCYTES # BLD AUTO: 0.73 K/UL — SIGNIFICANT CHANGE UP (ref 0–0.9)
MONOCYTES NFR BLD AUTO: 9.2 % — SIGNIFICANT CHANGE UP (ref 2–14)
NEUTROPHILS # BLD AUTO: 4.55 K/UL — SIGNIFICANT CHANGE UP (ref 1.8–7.4)
NEUTROPHILS NFR BLD AUTO: 57 % — SIGNIFICANT CHANGE UP (ref 43–77)
PHOSPHATE SERPL-MCNC: 3.1 MG/DL — SIGNIFICANT CHANGE UP (ref 2.4–4.7)
PLATELET # BLD AUTO: 241 K/UL — SIGNIFICANT CHANGE UP (ref 150–400)
POTASSIUM SERPL-MCNC: 4.1 MMOL/L — SIGNIFICANT CHANGE UP (ref 3.5–5.3)
POTASSIUM SERPL-SCNC: 4.1 MMOL/L — SIGNIFICANT CHANGE UP (ref 3.5–5.3)
RBC # BLD: 3.82 M/UL — SIGNIFICANT CHANGE UP (ref 3.8–5.2)
RBC # FLD: 13.3 % — SIGNIFICANT CHANGE UP (ref 10.3–14.5)
SODIUM SERPL-SCNC: 143 MMOL/L — SIGNIFICANT CHANGE UP (ref 135–145)
SPECIMEN SOURCE: SIGNIFICANT CHANGE UP
WBC # BLD: 7.97 K/UL — SIGNIFICANT CHANGE UP (ref 3.8–10.5)
WBC # FLD AUTO: 7.97 K/UL — SIGNIFICANT CHANGE UP (ref 3.8–10.5)

## 2021-12-18 PROCEDURE — 99233 SBSQ HOSP IP/OBS HIGH 50: CPT

## 2021-12-18 PROCEDURE — 93970 EXTREMITY STUDY: CPT | Mod: 26

## 2021-12-18 PROCEDURE — 71250 CT THORAX DX C-: CPT | Mod: 26

## 2021-12-18 RX ORDER — SACCHAROMYCES BOULARDII 250 MG
250 POWDER IN PACKET (EA) ORAL
Refills: 0 | Status: DISCONTINUED | OUTPATIENT
Start: 2021-12-18 | End: 2021-12-23

## 2021-12-18 RX ORDER — PIPERACILLIN AND TAZOBACTAM 4; .5 G/20ML; G/20ML
3.38 INJECTION, POWDER, LYOPHILIZED, FOR SOLUTION INTRAVENOUS EVERY 12 HOURS
Refills: 0 | Status: DISCONTINUED | OUTPATIENT
Start: 2021-12-18 | End: 2021-12-23

## 2021-12-18 RX ORDER — PIPERACILLIN AND TAZOBACTAM 4; .5 G/20ML; G/20ML
3.38 INJECTION, POWDER, LYOPHILIZED, FOR SOLUTION INTRAVENOUS ONCE
Refills: 0 | Status: COMPLETED | OUTPATIENT
Start: 2021-12-18 | End: 2021-12-18

## 2021-12-18 RX ADMIN — ENOXAPARIN SODIUM 40 MILLIGRAM(S): 100 INJECTION SUBCUTANEOUS at 11:10

## 2021-12-18 RX ADMIN — Medication 12.5 MILLIGRAM(S): at 17:31

## 2021-12-18 RX ADMIN — Medication 3 MILLILITER(S): at 16:10

## 2021-12-18 RX ADMIN — Medication 500 MILLIGRAM(S): at 11:09

## 2021-12-18 RX ADMIN — NYSTATIN CREAM 1 APPLICATION(S): 100000 CREAM TOPICAL at 17:31

## 2021-12-18 RX ADMIN — Medication 250 MILLIGRAM(S): at 17:30

## 2021-12-18 RX ADMIN — Medication 12.5 MILLIGRAM(S): at 05:24

## 2021-12-18 RX ADMIN — Medication 81 MILLIGRAM(S): at 11:09

## 2021-12-18 RX ADMIN — NYSTATIN CREAM 1 APPLICATION(S): 100000 CREAM TOPICAL at 05:25

## 2021-12-18 RX ADMIN — Medication 3 MILLILITER(S): at 21:23

## 2021-12-18 RX ADMIN — Medication 400 UNIT(S): at 11:09

## 2021-12-18 RX ADMIN — Medication 1 TABLET(S): at 11:09

## 2021-12-18 RX ADMIN — Medication 3 MILLILITER(S): at 04:01

## 2021-12-18 RX ADMIN — Medication 3 MILLILITER(S): at 09:17

## 2021-12-18 NOTE — DIETITIAN NUTRITION RISK NOTIFICATION - ADDITIONAL COMMENTS/DIETITIAN RECOMMENDATIONS
1) Add Ensure Pudding TID   2) Continue MVI daily   3) Encourage HBV protein sources   4) Monitor weights daily for trend/accuracy

## 2021-12-18 NOTE — DIETITIAN INITIAL EVALUATION ADULT. - SIGNS/SYMPTOMS
as evidenced by mild muscle/fat loss,likely meeting<75% EER >1 mo resulting in unintentional wt loss

## 2021-12-18 NOTE — PROGRESS NOTE ADULT - SUBJECTIVE AND OBJECTIVE BOX
PULMONARY PROGRESS NOTE      MANUEL BURRELL  [id number]    Patient is a 89y old  Female who presents with a chief complaint of Acute hypoxemic respiratory failure due to suspected diastolic CHF with acute decompensation (18 Dec 2021 15:57)        INTERVAL HPI/OVERNIGHT EVENTS:  Pt seen and examined at the bedside. She denies any acute SOB or chest pain.     MEDICATIONS  (STANDING):  albuterol/ipratropium for Nebulization 3 milliLiter(s) Nebulizer every 6 hours  ascorbic acid 500 milliGRAM(s) Oral daily  aspirin enteric coated 81 milliGRAM(s) Oral daily  cholecalciferol 400 Unit(s) Oral daily  enoxaparin Injectable 40 milliGRAM(s) SubCutaneous daily  metoprolol tartrate 12.5 milliGRAM(s) Oral two times a day  multivitamin 1 Tablet(s) Oral daily  nystatin Powder 1 Application(s) Topical two times a day  piperacillin/tazobactam IVPB. 3.375 Gram(s) IV Intermittent once  piperacillin/tazobactam IVPB.. 3.375 Gram(s) IV Intermittent every 12 hours  saccharomyces boulardii 250 milliGRAM(s) Oral two times a day    MEDICATIONS  (PRN):  ALBUTerol    90 MICROgram(s) HFA Inhaler 2 Puff(s) Inhalation every 4 hours PRN Shortness of Breath and/or Wheezing  traMADol 50 milliGRAM(s) Oral daily PRN Moderate to Severe Pain    Allergies    ibuprofen (Rash)  levofloxacin (Hives)    Intolerances      PAST MEDICAL & SURGICAL HISTORY:  Hypertension    Diabetes mellitus  type 2    Carotid artery disease    Cataract    Hernia    COPD (chronic obstructive pulmonary disease)    Dyslipidemia    GERD (gastroesophageal reflux disease)    CHF (congestive heart failure)    BBB (bundle branch block)  left    Pneumonia    Obesity    Hypertension    CAD (coronary artery disease)    Artificial cardiac pacemaker  8/2014    History of tonsillectomy    After cataract, right eye  3/31/2015    Adjustment and management of cardiac pacemaker          REVIEW OF SYSTEMS:  Negative except as noted in HPI      Vital Signs Last 24 Hrs  T(C): 37.1 (18 Dec 2021 17:20), Max: 37.1 (18 Dec 2021 11:34)  T(F): 98.7 (18 Dec 2021 17:20), Max: 98.7 (18 Dec 2021 11:34)  HR: 88 (18 Dec 2021 17:20) (66 - 88)  BP: 105/57 (18 Dec 2021 17:20) (105/57 - 114/66)  BP(mean): --  RR: 18 (18 Dec 2021 17:20) (16 - 18)  SpO2: 94% (18 Dec 2021 17:20) (94% - 98%)      PHYSICAL EXAMINATION:  GEN: In no apparent distress  HEENT: NC/AT  NECK: Supple, non-tender  CV: +S1, S2, RRR  RESPIRATORY: CTA B/L, good inspiratory effort, non-labored breathing  ABDOMEN: Soft, non-tender  EXTREMITIES: No rashes, lesions, or pitting edema noted  NEURO: Grossly non-focal  PSYCH: Normal affect      LABS:                        11.0   7.97  )-----------( 241      ( 18 Dec 2021 06:36 )             34.8     12-18    143  |  106  |  35.7<H>  ----------------------------<  134<H>  4.1   |  25.0  |  1.02    Ca    8.5<L>      18 Dec 2021 06:36  Phos  3.1     12-18  Mg     2.1     12-18                          MICROBIOLOGY:  COVID-19 PCR . (12.13.21 @ 20:45)    COVID-19 PCR: NotDetec: You can help in the fight against COVID-19. [facility] may contact  you to see if you are interested in voluntarily participating in one of  our clinical trials.  Testing is performed using polymerase chain reaction (PCR) or  transcription mediated amplification (TMA). This COVID-19 (SARS-CoV-2)  nucleic acid amplification test was validated by [facility] and is  in use under the FDA Emergency Use Authorization (EUA) for clinical labs  CLIA-certified to perform high complexity testing. Test results should be  correlated with clinical presentation, patient history, and epidemiology.        RADIOLOGY & ADDITIONAL STUDIES:  < from: CT Chest No Cont (12.18.21 @ 13:16) >  ACC: [id number] EXAM:  CT CHEST                          PROCEDURE DATE:  12/18/2021          INTERPRETATION:  CLINICAL INFORMATION: Evaluate infiltrates, question   interstitial lung disease    TECHNIQUE:  A volumetric CT acquisition of the chest was obtained from   the thoracic inlet to the upper abdomen, without the administration  of   intravenous contrast. Coronal and sagittal multiplanar reformations were   also submitted.    Comparison: 2/16/2018    FINDINGS:    Lungs/Airways/Pleura: Thereis upper lobe predominant centrilobular and   paraseptal emphysema. No traction bronchiectasis or reticulation. There   is region of groundglass/consolidation in the posterior right upper lobe.   There mild retained secretions in the trachea. No pleural effusion.    Mediastinum/Lymph nodes: Stable 1.2 cm nodule in the anterior mediastinum    Heart and Vessels: The heart is enlarged. Dual-chamber pacer leads   terminate in right atrium and right ventricle. There is mild-moderate   aortic valve calcification. The ascending aorta measures 3.6 cm the level   of the right pulmonary artery. No pericardial effusion. Pulmonary artery   is larger than the adjacent ascending aorta, which may be associated with   pulmonary hypertension.    Upper Abdomen: Stable mild thickening of the left adrenal gland.    Osseous structures and Soft Tissues: There is diffuse qualitative   osteopenia. No aggressive bone lesions.    IMPRESSION:    1.  New findings in the posterior right upper lobe suggestive of   pneumonia in the appropriate clinical setting.    2.  Upper lobe predominant centrilobular and paraseptal emphysema. No   interstitial lung disease.    3.  Stable 1.2 cm nodule in the anterior mediastinum. While this may   represent a lymph node, a repeat chest CT scan is recommended in one year   for reassessment as it may also be thymic in origin.    --- End of Report ---            MARCELLUS DUMONT M.D., Attending Radiologist  This document has been electronically signed. Dec 18 2021  1:51PM    < end of copied text >      ECHO:  < from: TTE Echo Complete w/ Contrast w/ Doppler (12.14.21 @ 20:44) >  Summary:   1. Left ventricular ejection fraction, by visual estimation, is >75%.   2. Hyperdynamic global left ventricular systolic function.   3. Severely enlarged left atrium.   4. Apical septal segment and apex are abnormal as described above.   5. Abnormal septal motion consistent with RV pacemaker.   6. Normal left ventricular internal cavity size.   7. The mitral in-flow pattern reveals no discernable A-wave, therefore   no comment on diastolic function can be made.   8. Normal right ventricular size and function.   9. Mildly enlarged right atrium.  10. There is no evidence of pericardial effusion.  11. Moderate mitral annular calcification.  12. Moderate aortic valve stenosis.  13. Based on DI and Mean gradient Aortic Stenosis appears to be Moderate   Aortic Stenosis.  14. Severely dilated pulmonary artery.  15. The Dimesionless Index value is .29.    MD Mary Electronically signed on 12/15/2021 at 9:37:21 AM            *** Final ***              ZACHARY WIGGINS MD; Attending Cardiologist  This document has been electronically signed. Dec 14 2021  8:44PM    < end of copied text >

## 2021-12-18 NOTE — DIETITIAN INITIAL EVALUATION ADULT. - ORAL INTAKE PTA/DIET HISTORY
Pt reports overall having decreased appetite/PO intake x few months, believes UBW ~155lbs but feels that she has lost weight. Pt states not eating high Na foods, eats mostly fruit/vegetables. Discussed fluid restriction and importance of low Na diet, Pt receptive and understanding.

## 2021-12-18 NOTE — PROGRESS NOTE ADULT - TIME BILLING
reviewing labs, notes, orders, radiographic studies, as well as counseling and coordinating care with the relevant multidisciplinary team, including with the primary and consulting providers.
reviewing labs, notes, orders, radiographic studies, as well as counseling and coordinating care with the relevant multidisciplinary team, including with the primary and consulting providers.

## 2021-12-18 NOTE — PROGRESS NOTE ADULT - ASSESSMENT
-HFpEF  -PNA  -VHD  -Fluid overload; dietary indiscretion  -Hx COPD, emphysema  -Hypoxic resp failure  -ITZ - improved  -Obesity    Recommendations:  CT chest with possible RUL PNA, would do 5-7 day course of Zosyn. Check sputum culture and PCT. DuoNebs. Monitor O2 requirements. She is s/p IV diuresis. DVT proph.    Elijah Cummins M.D.  Pulmonary & Critical Care Medicine  [facility]  Pulmonary and Sleep Medicine at [place]  [address]  [place] [address]  T: [phone]  F: [phone]

## 2021-12-18 NOTE — DIETITIAN INITIAL EVALUATION ADULT. - OTHER INFO
89yoF PMHx complete heart block s/p PPM (2014), HFpEF (last known EF 50-55%), HTN, chronic pain from severe OA of knees, ?COPD, HTN, HLD, non-compliant with statin who was sent from PMD office for hypoxia and concern for volume overload.  Pt reports 3-4 days of progressive exertional dyspnea which she reports started after she ate ‘[employer]’ cheeseburger from [employer] a few days ago.  She is on lasix 20mg and reports compliance with her diuretic. Pt went to her PMD yesterday where she was found to be markedly dyspnea when moving around in exam room and was noted to be satting 78% on room air. She was placed on 4L nasal cannula at the office and EMS was called who transported the pt to [facility] for further evaluation. Pt s/p RR 12/14 for hypotension, weaned off Hiflo O2, remains on 5L NC.

## 2021-12-18 NOTE — DIETITIAN INITIAL EVALUATION ADULT. - PERTINENT LABORATORY DATA
12-18 Na143 mmol/L Glu 134 mg/dL<H> K+ 4.1 mmol/L Cr  1.02 mg/dL BUN 35.7 mg/dL<H> Phos 3.1 mg/dL Alb n/a   PAB n/a

## 2021-12-18 NOTE — DIETITIAN NUTRITION RISK NOTIFICATION - TREATMENT: THE FOLLOWING DIET HAS BEEN RECOMMENDED
Diet, DASH/TLC:   Sodium & Cholesterol Restricted  1000mL Fluid Restriction ([id number]) (12-14-21 @ 02:30) [Active]

## 2021-12-18 NOTE — DIETITIAN INITIAL EVALUATION ADULT. - ETIOLOGY
related to inability to meet sufficient protein-energy needs in setting of COPD, CHF, decreased appetite/PO intake

## 2021-12-18 NOTE — PROGRESS NOTE ADULT - SUBJECTIVE AND OBJECTIVE BOX
CHIEF COMPLAINT/INTERVAL HISTORY:    Patient is a 89y old  Female who presents with a chief complaint of Acute hypoxemic respiratory failure due to suspected diastolic CHF with acute decompensation (18 Dec 2021 10:42)    SUBJECTIVE & OBJECTIVE: Pt seen and examined at bedside. No overnight events. Patient reports feeling better; OOB to chair today. Remains hypoxic on 5 liters. CT done - new right sided infiltrate. Started on abx.    ROS: No chest pain, palpitations, SOB, light headedness, dizziness, headache, nausea/vomiting, fevers/chills, abdominal pain, dysuria.    ICU Vital Signs Last 24 Hrs  T(C): 37.1 (18 Dec 2021 11:34), Max: 37.1 (18 Dec 2021 11:34)  T(F): 98.7 (18 Dec 2021 11:34), Max: 98.7 (18 Dec 2021 11:34)  HR: 81 (18 Dec 2021 11:34) (66 - 81)  BP: 114/66 (18 Dec 2021 11:34) (114/65 - 114/66)  BP(mean): --  ABP: --  ABP(mean): --  RR: 18 (18 Dec 2021 11:34) (16 - 18)  SpO2: 94% (18 Dec 2021 11:34) (94% - 98%)        MEDICATIONS  (STANDING):  albuterol/ipratropium for Nebulization 3 milliLiter(s) Nebulizer every 6 hours  ascorbic acid 500 milliGRAM(s) Oral daily  aspirin enteric coated 81 milliGRAM(s) Oral daily  cholecalciferol 400 Unit(s) Oral daily  enoxaparin Injectable 40 milliGRAM(s) SubCutaneous daily  metoprolol tartrate 12.5 milliGRAM(s) Oral two times a day  multivitamin 1 Tablet(s) Oral daily  nystatin Powder 1 Application(s) Topical two times a day  piperacillin/tazobactam IVPB. 3.375 Gram(s) IV Intermittent once  piperacillin/tazobactam IVPB.. 3.375 Gram(s) IV Intermittent every 12 hours  saccharomyces boulardii 250 milliGRAM(s) Oral two times a day    MEDICATIONS  (PRN):  ALBUTerol    90 MICROgram(s) HFA Inhaler 2 Puff(s) Inhalation every 4 hours PRN Shortness of Breath and/or Wheezing  traMADol 50 milliGRAM(s) Oral daily PRN Moderate to Severe Pain      LABS:                        11.0   7.97  )-----------( 241      ( 18 Dec 2021 06:36 )             34.8     12-18    143  |  106  |  35.7<H>  ----------------------------<  134<H>  4.1   |  25.0  |  1.02    Ca    8.5<L>      18 Dec 2021 06:36  Phos  3.1     12-18  Mg     2.1     12-18    PHYSICAL EXAM:    GENERAL: elderly female, laying in bed, NAD  HEAD:  Atraumatic, Normocephalic  EYES: EOMI, PERRLA, conjunctiva and sclera clear  ENMT: Moist mucous membranes  NECK: Supple   NERVOUS SYSTEM:  Alert & Oriented X3   CHEST/LUNG: coarse breath sounds  HEART: Regular rate and rhythm; + S1/S2  ABDOMEN: Soft, Nontender, Nondistended; Bowel sounds present, + abdominal hernia  EXTREMITIES:  no pedal edema

## 2021-12-18 NOTE — PROGRESS NOTE ADULT - ASSESSMENT
Patient is a 89 year old female with hx complete heart block s/p PPM (2014), HFpEF (last known EF 50-55%), HTN, chronic pain from severe OA of knees, COPD, HTN, HLD, non-compliant with statin, presenting with several days of exertional dyspnea after eating fast food, who was sent to hospital from PMD office for hypoxia in their office with concern for acute CHF exacerbation. CT chest completed today and patient was found to have new right sided PNA on 12/18; cultured and started on empiric abx.    Acute hypoxemic respiratory failure likely due to Acute on Chronic diastolic HF and now with concerns for Aspiration PNA  -remains hypoxic but weaned off hiflo on 12/17 and currently on 5 liters   -CXR on admission with pulm edema, BNP ~2000  -CT chest completed today; concerning for new right sided aspiration PNA  -Volume status improved s/p IV lasix; appears euvolemic now.   -Holding IV diuresis; to restart home PO lasix upon d/c  -TTE reviewed; preserved EF. Discussed with cardio. No indication for ischemic eval.  -D-dimer elevated but WNL when corrected for age.   -LE duplex negative for DVT  -Discussed CT findings with pulm; will check sputum/blood cultures and start empiric zosyn. SLP consult.   -Change bronchodilators to as needed  -Procal 0.15 - only mildly elevated; will repeat  -Wean supplemental O2 as tolerated; will likely need home O2. RN to check pulse ox on room air  -No further inpatient cardiac work up  -Pulm recommendations apprecaited    Acute Kidney Injury on CKD stage 3 - resolved  -likely due to overdiuresis and renal hypoperfusion  -creatinine peaked at 2.02 and is now 1.02  -hold lasix and hold ARB  -maintain MAP > 65  -strict I/Os, daily weights  -avoid nephrotoxic agents    Pyuria   -UA dirty but Ucx without growth     Hx HTN  -BP stable; no longer hypotensive  -Hold ARB and lasix at this time    Hx HLD  -Per outpatient PMD note, pt declines to use statins    Hx chronic pain due to osteoarthritis  -On tramadol PRN    Hx of CHB s/p PPM  -PPM interrogated  -tele discontinued    DVT ppx - Lovenox SC    Dispo - Wean O2 as tolerated; will assess for home O2. Check cultures and start IV abx given new findings of aspiration PNA (CT delayed due to patient being on hiflo until 12/17). SLP consult. PT follow up today; patient can be discharged home with supervision vs YOAN if no support at home. GOC discussion held with patient on 12/15; Patient is AAO x 4 and states she would like all heroic measures including chest compressions and intubation. HCP paperwork completed and placed in chart (HCP  Luke [phone]).    Plan discussed with patient, Dr. Cummins, RN, PT, family friend Walter Golden [phone] who will update Luca

## 2021-12-19 LAB
ANION GAP SERPL CALC-SCNC: 12 MMOL/L — SIGNIFICANT CHANGE UP (ref 5–17)
BASOPHILS # BLD AUTO: 0.04 K/UL — SIGNIFICANT CHANGE UP (ref 0–0.2)
BASOPHILS NFR BLD AUTO: 0.5 % — SIGNIFICANT CHANGE UP (ref 0–2)
BUN SERPL-MCNC: 25 MG/DL — HIGH (ref 8–20)
CALCIUM SERPL-MCNC: 8.6 MG/DL — SIGNIFICANT CHANGE UP (ref 8.6–10.2)
CHLORIDE SERPL-SCNC: 104 MMOL/L — SIGNIFICANT CHANGE UP (ref 98–107)
CO2 SERPL-SCNC: 25 MMOL/L — SIGNIFICANT CHANGE UP (ref 22–29)
CREAT SERPL-MCNC: 0.9 MG/DL — SIGNIFICANT CHANGE UP (ref 0.5–1.3)
EOSINOPHIL # BLD AUTO: 0.87 K/UL — HIGH (ref 0–0.5)
EOSINOPHIL NFR BLD AUTO: 11.9 % — HIGH (ref 0–6)
GLUCOSE SERPL-MCNC: 129 MG/DL — HIGH (ref 70–99)
HCT VFR BLD CALC: 35.4 % — SIGNIFICANT CHANGE UP (ref 34.5–45)
HGB BLD-MCNC: 10.9 G/DL — LOW (ref 11.5–15.5)
IMM GRANULOCYTES NFR BLD AUTO: 0.4 % — SIGNIFICANT CHANGE UP (ref 0–1.5)
LYMPHOCYTES # BLD AUTO: 1.59 K/UL — SIGNIFICANT CHANGE UP (ref 1–3.3)
LYMPHOCYTES # BLD AUTO: 21.8 % — SIGNIFICANT CHANGE UP (ref 13–44)
MAGNESIUM SERPL-MCNC: 2 MG/DL — SIGNIFICANT CHANGE UP (ref 1.6–2.6)
MCHC RBC-ENTMCNC: 28.4 PG — SIGNIFICANT CHANGE UP (ref 27–34)
MCHC RBC-ENTMCNC: 30.8 GM/DL — LOW (ref 32–36)
MCV RBC AUTO: 92.2 FL — SIGNIFICANT CHANGE UP (ref 80–100)
MONOCYTES # BLD AUTO: 0.63 K/UL — SIGNIFICANT CHANGE UP (ref 0–0.9)
MONOCYTES NFR BLD AUTO: 8.6 % — SIGNIFICANT CHANGE UP (ref 2–14)
NEUTROPHILS # BLD AUTO: 4.14 K/UL — SIGNIFICANT CHANGE UP (ref 1.8–7.4)
NEUTROPHILS NFR BLD AUTO: 56.8 % — SIGNIFICANT CHANGE UP (ref 43–77)
PHOSPHATE SERPL-MCNC: 2.8 MG/DL — SIGNIFICANT CHANGE UP (ref 2.4–4.7)
PLATELET # BLD AUTO: 238 K/UL — SIGNIFICANT CHANGE UP (ref 150–400)
POTASSIUM SERPL-MCNC: 4.3 MMOL/L — SIGNIFICANT CHANGE UP (ref 3.5–5.3)
POTASSIUM SERPL-SCNC: 4.3 MMOL/L — SIGNIFICANT CHANGE UP (ref 3.5–5.3)
PROCALCITONIN SERPL-MCNC: 0.07 NG/ML — SIGNIFICANT CHANGE UP (ref 0.02–0.1)
RBC # BLD: 3.84 M/UL — SIGNIFICANT CHANGE UP (ref 3.8–5.2)
RBC # FLD: 13.2 % — SIGNIFICANT CHANGE UP (ref 10.3–14.5)
SODIUM SERPL-SCNC: 141 MMOL/L — SIGNIFICANT CHANGE UP (ref 135–145)
WBC # BLD: 7.3 K/UL — SIGNIFICANT CHANGE UP (ref 3.8–10.5)
WBC # FLD AUTO: 7.3 K/UL — SIGNIFICANT CHANGE UP (ref 3.8–10.5)

## 2021-12-19 PROCEDURE — 99233 SBSQ HOSP IP/OBS HIGH 50: CPT

## 2021-12-19 RX ADMIN — Medication 500 MILLIGRAM(S): at 12:21

## 2021-12-19 RX ADMIN — Medication 3 MILLILITER(S): at 10:37

## 2021-12-19 RX ADMIN — Medication 3 MILLILITER(S): at 21:49

## 2021-12-19 RX ADMIN — Medication 3 MILLILITER(S): at 03:45

## 2021-12-19 RX ADMIN — PIPERACILLIN AND TAZOBACTAM 25 GRAM(S): 4; .5 INJECTION, POWDER, LYOPHILIZED, FOR SOLUTION INTRAVENOUS at 17:33

## 2021-12-19 RX ADMIN — Medication 81 MILLIGRAM(S): at 12:20

## 2021-12-19 RX ADMIN — Medication 1 TABLET(S): at 12:20

## 2021-12-19 RX ADMIN — Medication 250 MILLIGRAM(S): at 17:35

## 2021-12-19 RX ADMIN — Medication 12.5 MILLIGRAM(S): at 05:10

## 2021-12-19 RX ADMIN — NYSTATIN CREAM 1 APPLICATION(S): 100000 CREAM TOPICAL at 05:10

## 2021-12-19 RX ADMIN — ENOXAPARIN SODIUM 40 MILLIGRAM(S): 100 INJECTION SUBCUTANEOUS at 12:21

## 2021-12-19 RX ADMIN — Medication 12.5 MILLIGRAM(S): at 17:35

## 2021-12-19 RX ADMIN — NYSTATIN CREAM 1 APPLICATION(S): 100000 CREAM TOPICAL at 18:42

## 2021-12-19 RX ADMIN — Medication 250 MILLIGRAM(S): at 05:10

## 2021-12-19 RX ADMIN — Medication 400 UNIT(S): at 12:21

## 2021-12-19 NOTE — PROGRESS NOTE ADULT - ASSESSMENT
Patient is a 89 year old female with hx complete heart block s/p PPM (2014), HFpEF (last known EF 50-55%), HTN, chronic pain from severe OA of knees, COPD, HTN, HLD, non-compliant with statin, presenting with several days of exertional dyspnea after eating fast food, who was sent to hospital from PMD office for hypoxia in their office with concern for acute CHF exacerbation. CT chest completed today and patient was found to have new right sided PNA on 12/18; cultured and started on empiric abx.    Acute hypoxemic respiratory failure likely due to Acute on Chronic diastolic HF and now with concerns for Aspiration PNA  -remains hypoxic but weaned off hiflo on 12/17   -currently on 5L nasal cannula  -CXR on admission with pulm edema, BNP ~2000  -RUL infiltrate on CT, continue Zosyn, follow up cultures  -Volume status improved s/p IV lasix; appears euvolemic now.   -Holding IV diuresis; to restart home PO lasix upon d/c  -TTE reviewed; preserved EF. Discussed with cardio. No indication for ischemic eval.  -D-dimer elevated but WNL when corrected for age.   -LE duplex negative for DVT  -Wean supplemental O2 as tolerated; will likely need home O2. RN to check pulse ox on room air  -No further inpatient cardiac work up  -Pulm recommendations noted    Acute Kidney Injury on CKD stage 3 - resolved  -likely due to overdiuresis and renal hypoperfusion  -creatinine peaked at 2.02 and is now 1.02  -hold lasix and hold ARB  -maintain MAP > 65  -strict I/Os, daily weights  -avoid nephrotoxic agents    Pyuria   -UA dirty but Ucx without growth     Hx HTN  -BP stable; no longer hypotensive  -Hold ARB and lasix at this time    Hx HLD  -Per outpatient PMD note, pt declines to use statins    Hx chronic pain due to osteoarthritis  -On tramadol PRN    Hx of CHB s/p PPM  -PPM interrogated  -tele discontinued    DVT ppx - Lovenox SC    Discussed with patient and RN.  Attempt to wean oxygen as tolerated, will need eval for home oxygen closer to discharge.

## 2021-12-19 NOTE — PROGRESS NOTE ADULT - SUBJECTIVE AND OBJECTIVE BOX
INTERVAL HPI/OVERNIGHT EVENTS:    CC: acute hypoxic resp failure, aspiration pneumonia, acute systolic CHF, possible aspiration pneumonia, hypertension.    Chart and course reviewed. No overnight events. Mild cough, on 5L NC, episodes of desaturation overnight as per RN.    Vital Signs Last 24 Hrs  T(C): 36.6 (19 Dec 2021 12:38), Max: 37.1 (18 Dec 2021 17:20)  T(F): 97.8 (19 Dec 2021 12:38), Max: 98.7 (18 Dec 2021 17:20)  HR: 64 (19 Dec 2021 12:38) (64 - 88)  BP: 114/70 (19 Dec 2021 12:38) (104/65 - 114/70)  BP(mean): --  RR: 18 (19 Dec 2021 12:38) (18 - 18)  SpO2: 94% (19 Dec 2021 12:38) (94% - 94%)    PHYSICAL EXAM:    GENERAL: alert, not in distress  CHEST/LUNG: b/l air entry, no crackles  HEART: reg  ABDOMEN: soft, non tender  EXTREMITIES:  no edema, tenderness    MEDICATIONS  (STANDING):  albuterol/ipratropium for Nebulization 3 milliLiter(s) Nebulizer every 6 hours  ascorbic acid 500 milliGRAM(s) Oral daily  aspirin enteric coated 81 milliGRAM(s) Oral daily  cholecalciferol 400 Unit(s) Oral daily  enoxaparin Injectable 40 milliGRAM(s) SubCutaneous daily  metoprolol tartrate 12.5 milliGRAM(s) Oral two times a day  multivitamin 1 Tablet(s) Oral daily  nystatin Powder 1 Application(s) Topical two times a day  piperacillin/tazobactam IVPB.. 3.375 Gram(s) IV Intermittent every 12 hours  saccharomyces boulardii 250 milliGRAM(s) Oral two times a day    MEDICATIONS  (PRN):  ALBUTerol    90 MICROgram(s) HFA Inhaler 2 Puff(s) Inhalation every 4 hours PRN Shortness of Breath and/or Wheezing  traMADol 50 milliGRAM(s) Oral daily PRN Moderate to Severe Pain      Allergies    ibuprofen (Rash)  levofloxacin (Hives)    Intolerances          LABS:                          10.9   7.30  )-----------( 238      ( 19 Dec 2021 06:19 )             35.4     12-19    141  |  104  |  25.0<H>  ----------------------------<  129<H>  4.3   |  25.0  |  0.90    Ca    8.6      19 Dec 2021 06:19  Phos  2.8     12-19  Mg     2.0     12-19            RADIOLOGY & ADDITIONAL TESTS:

## 2021-12-20 LAB
ANION GAP SERPL CALC-SCNC: 11 MMOL/L — SIGNIFICANT CHANGE UP (ref 5–17)
BUN SERPL-MCNC: 18.9 MG/DL — SIGNIFICANT CHANGE UP (ref 8–20)
CALCIUM SERPL-MCNC: 8.6 MG/DL — SIGNIFICANT CHANGE UP (ref 8.6–10.2)
CHLORIDE SERPL-SCNC: 100 MMOL/L — SIGNIFICANT CHANGE UP (ref 98–107)
CO2 SERPL-SCNC: 29 MMOL/L — SIGNIFICANT CHANGE UP (ref 22–29)
CREAT SERPL-MCNC: 0.89 MG/DL — SIGNIFICANT CHANGE UP (ref 0.5–1.3)
GLUCOSE SERPL-MCNC: 136 MG/DL — HIGH (ref 70–99)
HCT VFR BLD CALC: 35.5 % — SIGNIFICANT CHANGE UP (ref 34.5–45)
HGB BLD-MCNC: 11.1 G/DL — LOW (ref 11.5–15.5)
MAGNESIUM SERPL-MCNC: 2 MG/DL — SIGNIFICANT CHANGE UP (ref 1.8–2.6)
MCHC RBC-ENTMCNC: 28.3 PG — SIGNIFICANT CHANGE UP (ref 27–34)
MCHC RBC-ENTMCNC: 31.3 GM/DL — LOW (ref 32–36)
MCV RBC AUTO: 90.6 FL — SIGNIFICANT CHANGE UP (ref 80–100)
PLATELET # BLD AUTO: 253 K/UL — SIGNIFICANT CHANGE UP (ref 150–400)
POTASSIUM SERPL-MCNC: 4.5 MMOL/L — SIGNIFICANT CHANGE UP (ref 3.5–5.3)
POTASSIUM SERPL-SCNC: 4.5 MMOL/L — SIGNIFICANT CHANGE UP (ref 3.5–5.3)
RBC # BLD: 3.92 M/UL — SIGNIFICANT CHANGE UP (ref 3.8–5.2)
RBC # FLD: 13.1 % — SIGNIFICANT CHANGE UP (ref 10.3–14.5)
SARS-COV-2 RNA SPEC QL NAA+PROBE: SIGNIFICANT CHANGE UP
SODIUM SERPL-SCNC: 140 MMOL/L — SIGNIFICANT CHANGE UP (ref 135–145)
WBC # BLD: 9.68 K/UL — SIGNIFICANT CHANGE UP (ref 3.8–10.5)
WBC # FLD AUTO: 9.68 K/UL — SIGNIFICANT CHANGE UP (ref 3.8–10.5)

## 2021-12-20 PROCEDURE — 99232 SBSQ HOSP IP/OBS MODERATE 35: CPT

## 2021-12-20 RX ORDER — ALBUTEROL 90 UG/1
2 AEROSOL, METERED ORAL EVERY 6 HOURS
Refills: 0 | Status: COMPLETED | OUTPATIENT
Start: 2021-12-20 | End: 2022-11-18

## 2021-12-20 RX ORDER — FUROSEMIDE 40 MG
20 TABLET ORAL DAILY
Refills: 0 | Status: DISCONTINUED | OUTPATIENT
Start: 2021-12-21 | End: 2021-12-23

## 2021-12-20 RX ORDER — ALBUTEROL 90 UG/1
2 AEROSOL, METERED ORAL EVERY 6 HOURS
Refills: 0 | Status: DISCONTINUED | OUTPATIENT
Start: 2021-12-20 | End: 2021-12-23

## 2021-12-20 RX ORDER — IPRATROPIUM/ALBUTEROL SULFATE 18-103MCG
3 AEROSOL WITH ADAPTER (GRAM) INHALATION EVERY 6 HOURS
Refills: 0 | Status: DISCONTINUED | OUTPATIENT
Start: 2021-12-20 | End: 2021-12-23

## 2021-12-20 RX ORDER — ONDANSETRON 8 MG/1
4 TABLET, FILM COATED ORAL ONCE
Refills: 0 | Status: COMPLETED | OUTPATIENT
Start: 2021-12-20 | End: 2021-12-20

## 2021-12-20 RX ORDER — TIOTROPIUM BROMIDE 18 UG/1
1 CAPSULE ORAL; RESPIRATORY (INHALATION) DAILY
Refills: 0 | Status: COMPLETED | OUTPATIENT
Start: 2021-12-20 | End: 2022-11-18

## 2021-12-20 RX ORDER — TIOTROPIUM BROMIDE 18 UG/1
1 CAPSULE ORAL; RESPIRATORY (INHALATION) DAILY
Refills: 0 | Status: DISCONTINUED | OUTPATIENT
Start: 2021-12-20 | End: 2021-12-23

## 2021-12-20 RX ADMIN — Medication 81 MILLIGRAM(S): at 10:51

## 2021-12-20 RX ADMIN — ALBUTEROL 2 PUFF(S): 90 AEROSOL, METERED ORAL at 16:53

## 2021-12-20 RX ADMIN — Medication 400 UNIT(S): at 10:51

## 2021-12-20 RX ADMIN — ENOXAPARIN SODIUM 40 MILLIGRAM(S): 100 INJECTION SUBCUTANEOUS at 10:51

## 2021-12-20 RX ADMIN — Medication 250 MILLIGRAM(S): at 18:20

## 2021-12-20 RX ADMIN — Medication 500 MILLIGRAM(S): at 10:51

## 2021-12-20 RX ADMIN — NYSTATIN CREAM 1 APPLICATION(S): 100000 CREAM TOPICAL at 22:00

## 2021-12-20 RX ADMIN — Medication 12.5 MILLIGRAM(S): at 05:13

## 2021-12-20 RX ADMIN — Medication 12.5 MILLIGRAM(S): at 18:19

## 2021-12-20 RX ADMIN — PIPERACILLIN AND TAZOBACTAM 25 GRAM(S): 4; .5 INJECTION, POWDER, LYOPHILIZED, FOR SOLUTION INTRAVENOUS at 05:13

## 2021-12-20 RX ADMIN — TIOTROPIUM BROMIDE 1 CAPSULE(S): 18 CAPSULE ORAL; RESPIRATORY (INHALATION) at 16:53

## 2021-12-20 RX ADMIN — NYSTATIN CREAM 1 APPLICATION(S): 100000 CREAM TOPICAL at 05:13

## 2021-12-20 RX ADMIN — ONDANSETRON 4 MILLIGRAM(S): 8 TABLET, FILM COATED ORAL at 00:17

## 2021-12-20 RX ADMIN — Medication 250 MILLIGRAM(S): at 05:13

## 2021-12-20 RX ADMIN — Medication 1 TABLET(S): at 10:51

## 2021-12-20 RX ADMIN — PIPERACILLIN AND TAZOBACTAM 25 GRAM(S): 4; .5 INJECTION, POWDER, LYOPHILIZED, FOR SOLUTION INTRAVENOUS at 18:20

## 2021-12-20 RX ADMIN — ALBUTEROL 2 PUFF(S): 90 AEROSOL, METERED ORAL at 21:34

## 2021-12-20 NOTE — PROGRESS NOTE ADULT - ASSESSMENT
89 year old female with hx complete heart block s/p PPM (2014), HFpEF (last known EF 50-55%), HTN, chronic pain from severe OA of knees, COPD, HTN, HLD, non-compliant with statin, presenting with several days of exertional dyspnea after eating fast food, who was sent to hospital from PMD office for hypoxia. Pt admitted with acute on chronic diastolic CHF exacerbation. Pt was given IV diuresis, complicated by ITZ for which diuresis discontinued and resolution of ITZ. Pt remained euvolemic thereafter. But pt continued to require HFNC difficult to titrate until 12/17 when she was weaned to 5 l via nc. CT chest completed and found new right sided PNA on 12/18; B cx pending and pt empirically started on zosyn for aspiration PNA. Swallow therapist consulted for evaluation. Also PT consulted for mobilization and initiation of dispo planning.     Acute hypoxemic respiratory failure likely due to Acute on Chronic diastolic HF and Aspiration PNA  - currently able to be weaned down to 3l via nc and saturating 94 %   -c/w mx for HF and mx for aspiration PNA  - underlying COPD stable   -D-dimer elevated but WNL when corrected for age.   -LE duplex negative for DVT    Acute on chronic diastolic HFPEF   -remains hypoxic but weaned off hiflo on 12/17   -currently tapered down to 3 L nasal cannula  -CXR on admission with pulm edema, BNP ~2000  -Volume status improved s/p IV lasix which was discontinued secondary to ITZ-  appears euvolemic now.   -restart lasix 20mg po qd in the am with parameters   -TTE reviewed; preserved EF. Per cardio no indication for ischemic eval.  -Wean supplemental O2 as tolerated; currently down titrated to 3l via nc. Will need home o2 on discharge.   -No further inpatient cardiac work up  - cardio f/u noted and appreciated, signed off   -Pulm recommendations noted and appreciated     Aspiration PNA  - improving   - afebrile  - no leukocytosis   - awaiting B cx will result later today   - c/w zosyn D# 3/7 - will change to augmentin on discharge  - c/w florastor po bid  - c/w incentive spirometry   -c/w aspiration precautions   - swallow therapist consulted     Acute Kidney Injury on CKD stage 3 - resolved  -likely due to overdiuresis and renal hypoperfusion  -creatinine peaked at 2.02 and now has remained wnl for multiple days   -hold ARB  - will restart lasix tomorrow and see if pt tolerates   -maintain MAP > 65  -strict I/Os, daily weights  -avoid nephrotoxic agents    Hx COPD  - stable   -c/w duoneb q6hrs   -c/w albuterol, spiriva   - pulm f/u noted and appreciated     Hx HTN  -BP stable; no longer hypotensive  -c/w metoprolol po bid  - restart lasix tomorrow with parameters  - no ARB secondary to initial ITZ     Hx HLD  -Per outpatient PMD note, pt declines to use statins  - c/w diet control     Hx chronic pain due to osteoarthritis  - currently not in any exacerbation   -c/w tramadol PRN    Hx of CHB s/p PPM  -PPM interrogated  - cardio f/u noted and appreciated, signed off      DVT ppx   -c/w  Lovenox Sq     Dispo: PT re evaluating the patient today. She reports she lives in a one floor home, has one step to get in and no steps when shes inside. Will anticipate discharge in 24 hrs pending continual improvement in sx. Will need home o2 evaluation for CHF/COPD. D/w MARCELLA Gonzalez.        89 year old female with hx complete heart block s/p PPM (2014), HFpEF (last known EF 50-55%), HTN, chronic pain from severe OA of knees, COPD, HTN, HLD, non-compliant with statin, presenting with several days of exertional dyspnea after eating fast food, who was sent to hospital from PMD office for hypoxia. Pt admitted with acute on chronic diastolic CHF exacerbation. Pt was given IV diuresis, complicated by ITZ for which diuresis discontinued and resolution of ITZ. Pt remained euvolemic thereafter. But pt continued to require HFNC difficult to titrate until 12/17 when she was weaned to 5 l via nc. CT chest completed and found new right sided PNA on 12/18; B cx pending and pt empirically started on zosyn for aspiration PNA. Swallow therapist consulted for evaluation. Also PT consulted for mobilization and initiation of dispo planning.     Acute hypoxemic respiratory failure likely due to Acute on Chronic diastolic HF and Aspiration PNA  - currently able to be weaned down to 3l via nc and saturating 94 %   -c/w mx for HF and mx for aspiration PNA  - underlying COPD stable   -D-dimer elevated but WNL when corrected for age.   -LE duplex negative for DVT    Acute on chronic diastolic HFPEF   -remains hypoxic but weaned off hiflo on 12/17   -currently tapered down to 3 L nasal cannula  -CXR on admission with pulm edema, BNP ~2000  -Volume status improved s/p IV lasix which was discontinued secondary to ITZ-  appears euvolemic now.   -restart lasix 20mg po qd in the am with parameters   -TTE reviewed; preserved EF. Per cardio no indication for ischemic eval.  -Wean supplemental O2 as tolerated; currently down titrated to 3l via nc. Will need home o2 on discharge.   -No further inpatient cardiac work up  - cardio f/u noted and appreciated, signed off   -Pulm recommendations noted and appreciated     Aspiration PNA  - improving   - afebrile  - no leukocytosis   - awaiting B cx will result later today   - c/w zosyn D# 3/7 - will change to augmentin on discharge  - c/w florastor po bid  - c/w incentive spirometry   -c/w aspiration precautions   - swallow therapist consulted     Acute Kidney Injury on CKD stage 3 - resolved  -likely due to overdiuresis and renal hypoperfusion  -creatinine peaked at 2.02 and now has remained wnl for multiple days   -hold ARB  - will restart lasix tomorrow and see if pt tolerates   -maintain MAP > 65  -strict I/Os, daily weights  -avoid nephrotoxic agents    Hx COPD  - stable   -c/w duoneb q6hrs   -c/w albuterol, spiriva   - pulm f/u noted and appreciated     Hx HTN  -BP stable; no longer hypotensive  -c/w metoprolol po bid  - restart lasix tomorrow with parameters  - no ARB secondary to initial ITZ     Hx HLD  -Per outpatient PMD note, pt declines to use statins  - c/w diet control     Hx chronic pain due to osteoarthritis  - currently not in any exacerbation   -c/w tramadol PRN    Hx of CHB s/p PPM  -PPM interrogated  - cardio f/u noted and appreciated, signed off     Moderate protein calorie malnutrition   -c/w supplementation  - nutrition evaluation appreciated      DVT ppx   -c/w  Lovenox Sq     Dispo: PT re evaluating the patient today. She reports she lives in a one floor home, has one step to get in and no steps when shes inside. Will anticipate discharge in 24 hrs pending continual improvement in sx. Will need home o2 evaluation for CHF/COPD. D/w MARCELLA Gonzalez.

## 2021-12-20 NOTE — PATIENT PROFILE ADULT - FALL HARM RISK - HARM RISK INTERVENTIONS
Assistance with ambulation/Assistance OOB with selected safe patient handling equipment/Communicate Risk of Fall with Harm to all staff/Discuss with provider need for PT consult/Monitor gait and stability/Reinforce activity limits and safety measures with patient and family/Tailored Fall Risk Interventions/Visual Cue: Yellow wristband and red socks/Bed in lowest position, wheels locked, appropriate side rails in place/Call bell, personal items and telephone in reach/Instruct patient to call for assistance before getting out of bed or chair/Non-slip footwear when patient is out of bed/[place] to call system/Physically safe environment - no spills, clutter or unnecessary equipment/Purposeful Proactive Rounding/Room/bathroom lighting operational, light cord in reach

## 2021-12-20 NOTE — SWALLOW BEDSIDE ASSESSMENT ADULT - SLP PERTINENT HISTORY OF CURRENT PROBLEM
As per MD note, "89 year old female with hx complete heart block s/p PPM (2014), HFpEF (last known EF 50-55%), HTN, chronic pain from severe OA of knees, COPD, HTN, HLD, non-compliant with statin, presenting with several days of exertional dyspnea after eating fast food, who was sent to hospital from PMD office for hypoxia. Pt admitted with acute on chronic diastolic CHF exacerbation. Pt was given IV diuresis, complicated by ITZ for which diuresis discontinued and resolution of ITZ. Pt remained euvolemic thereafter. But pt continued to require HFNC difficult to titrate until 12/17 when she was weaned to 5 l via nc. CT chest completed and found new right sided PNA on 12/18; B cx pending and pt empirically started on zosyn for aspiration PNA. Swallow therapist consulted for evaluation. Also PT consulted for mobilization and initiation of dispo planning".

## 2021-12-20 NOTE — SWALLOW BEDSIDE ASSESSMENT ADULT - SWALLOW EVAL: DIAGNOSIS
Pt presents w/functional oropharyngeal swallow, w/no overt s/s penetration or aspiration noted across consistencies at the bedside.

## 2021-12-20 NOTE — PROGRESS NOTE ADULT - SUBJECTIVE AND OBJECTIVE BOX
Patient is a 89y old  Female who presents with a chief complaint of Acute hypoxemic respiratory failure due to suspected diastolic CHF with acute decompensation (19 Dec 2021 14:04) remains hypoxic continuing to wean o2  needs PT evaluation and swallow eval for dispo       HEALTH ISSUES - PROBLEM Dx:  SOB (shortness of breath)  Moderate COPD (chronic obstructive pulmonary disease)  Emphysema of lung  Acute respratory failure with hypoxia  Acute diastolic congestive heart failure        INTERVAL HPI/OVERNIGHT EVENTS:  Patient seen and examined at bedside. No acute events overnight. Patient states she is feeling much better, working with PT and currently able to be weaned down to 3l via nc and saturating 94%. D/w the patient who reports cough is improving, no phlegm. Also continues to perform incentive spirometry at bedside. Patient denies chest pain,  abd pain, N/V, fever, chills, dysuria or any other complaints. All remainder ROS negative.     Vital Signs Last 24 Hrs  T(C): 36.9 (20 Dec 2021 08:58), Max: 37 (19 Dec 2021 17:37)  T(F): 98.4 (20 Dec 2021 08:58), Max: 98.6 (19 Dec 2021 17:37)  HR: 68 (20 Dec 2021 08:58) (64 - 87)  BP: 123/67 (20 Dec 2021 08:58) (114/70 - 154/76)  BP(mean): --  RR: 18 (20 Dec 2021 08:58) (17 - 18)  SpO2: 94% (20 Dec 2021 08:58) (90% - 94%)      CONSTITUTIONAL: Well appearing, awake, alert and in no apparent distress  CARDIAC: Normal rate, regular rhythm.  Heart sounds S1, S2.  No murmurs, rubs or gallops   RESPIRATORY: Fair air entry  slight rhales in R lung   GASTROENTEROLOGY: Soft nt nd bs+normoactive   EXTREMITIES: No edema, cyanosis or deformity   NEUROLOGICAL: Alert and oriented, no focal deficits, no motor or sensory deficits.  SKIN: No rash, skin turgor wnl     MEDICATIONS  (STANDING):  ALBUTerol    90 MICROgram(s) HFA Inhaler 2 Puff(s) Inhalation every 6 hours  ascorbic acid 500 milliGRAM(s) Oral daily  aspirin enteric coated 81 milliGRAM(s) Oral daily  cholecalciferol 400 Unit(s) Oral daily  enoxaparin Injectable 40 milliGRAM(s) SubCutaneous daily  metoprolol tartrate 12.5 milliGRAM(s) Oral two times a day  multivitamin 1 Tablet(s) Oral daily  nystatin Powder 1 Application(s) Topical two times a day  piperacillin/tazobactam IVPB.. 3.375 Gram(s) IV Intermittent every 12 hours  saccharomyces boulardii 250 milliGRAM(s) Oral two times a day  tiotropium 18 MICROgram(s) Capsule 1 Capsule(s) Inhalation daily    MEDICATIONS  (PRN):  traMADol 50 milliGRAM(s) Oral daily PRN Moderate to Severe Pain      LABS:                        11.1   9.68  )-----------( 253      ( 20 Dec 2021 06:47 )             35.5     12-20    140  |  100  |  18.9  ----------------------------<  136<H>  4.5   |  29.0  |  0.89    Ca    8.6      20 Dec 2021 06:47  Phos  2.8     12-19  Mg     2.0     12-20    RADIOLOGY & ADDITIONAL TESTS:  No new imaging studies  Patient is a 89y old  Female who presents with a chief complaint of Acute hypoxemic respiratory failure due to suspected diastolic CHF with acute decompensation (19 Dec 2021 14:04) remains hypoxic continuing to wean o2  needs PT evaluation and swallow eval for dispo       HEALTH ISSUES - PROBLEM Dx:  SOB (shortness of breath)  Moderate COPD (chronic obstructive pulmonary disease)  Emphysema of lung  Acute respratory failure with hypoxia  Acute diastolic congestive heart failure        INTERVAL HPI/OVERNIGHT EVENTS:  Patient seen and examined at bedside. No acute events overnight. Patient states she is feeling much better, working with PT and currently able to be weaned down to 3l via nc and saturating 94%. D/w the patient who reports cough is improving, no phlegm. Also continues to perform incentive spirometry at bedside. Patient denies chest pain,  abd pain, N/V, fever, chills, dysuria or any other complaints. All remainder ROS negative.     Vital Signs Last 24 Hrs  T(C): 36.9 (20 Dec 2021 08:58), Max: 37 (19 Dec 2021 17:37)  T(F): 98.4 (20 Dec 2021 08:58), Max: 98.6 (19 Dec 2021 17:37)  HR: 68 (20 Dec 2021 08:58) (64 - 87)  BP: 123/67 (20 Dec 2021 08:58) (114/70 - 154/76)  BP(mean): --  RR: 18 (20 Dec 2021 08:58) (17 - 18)  SpO2: 94% (20 Dec 2021 08:58) (90% - 94%)      CONSTITUTIONAL: Well appearing,  awake, alert and in no apparent distress  CARDIAC: Normal rate, regular rhythm.  Heart sounds S1, S2.  No murmurs, rubs or gallops  PPM in place  RESPIRATORY: Fair air entry  slight rhales in R lung   on 3 l via nc   GASTROENTEROLOGY: Soft nt nd bs+normoactive   EXTREMITIES: No edema, cyanosis or deformity   NEUROLOGICAL: Alert and oriented, no focal deficits, no motor or sensory deficits.  SKIN: No rash, skin turgor wnl     MEDICATIONS  (STANDING):  ALBUTerol    90 MICROgram(s) HFA Inhaler 2 Puff(s) Inhalation every 6 hours  ascorbic acid 500 milliGRAM(s) Oral daily  aspirin enteric coated 81 milliGRAM(s) Oral daily  cholecalciferol 400 Unit(s) Oral daily  enoxaparin Injectable 40 milliGRAM(s) SubCutaneous daily  metoprolol tartrate 12.5 milliGRAM(s) Oral two times a day  multivitamin 1 Tablet(s) Oral daily  nystatin Powder 1 Application(s) Topical two times a day  piperacillin/tazobactam IVPB.. 3.375 Gram(s) IV Intermittent every 12 hours  saccharomyces boulardii 250 milliGRAM(s) Oral two times a day  tiotropium 18 MICROgram(s) Capsule 1 Capsule(s) Inhalation daily    MEDICATIONS  (PRN):  traMADol 50 milliGRAM(s) Oral daily PRN Moderate to Severe Pain      LABS:                        11.1   9.68  )-----------( 253      ( 20 Dec 2021 06:47 )             35.5     12-20    140  |  100  |  18.9  ----------------------------<  136<H>  4.5   |  29.0  |  0.89    Ca    8.6      20 Dec 2021 06:47  Phos  2.8     12-19  Mg     2.0     12-20    RADIOLOGY & ADDITIONAL TESTS:  No new imaging studies

## 2021-12-20 NOTE — SWALLOW BEDSIDE ASSESSMENT ADULT - SLP GENERAL OBSERVATIONS
Pt recd awake/upright OOB to chair, A&A Ox3, 0/10 pain pre/post, tolerating O2 via NC no overt distress.

## 2021-12-21 PROCEDURE — 99232 SBSQ HOSP IP/OBS MODERATE 35: CPT

## 2021-12-21 RX ADMIN — TIOTROPIUM BROMIDE 1 CAPSULE(S): 18 CAPSULE ORAL; RESPIRATORY (INHALATION) at 09:51

## 2021-12-21 RX ADMIN — Medication 12.5 MILLIGRAM(S): at 05:51

## 2021-12-21 RX ADMIN — Medication 250 MILLIGRAM(S): at 05:51

## 2021-12-21 RX ADMIN — Medication 1 TABLET(S): at 12:50

## 2021-12-21 RX ADMIN — PIPERACILLIN AND TAZOBACTAM 25 GRAM(S): 4; .5 INJECTION, POWDER, LYOPHILIZED, FOR SOLUTION INTRAVENOUS at 05:52

## 2021-12-21 RX ADMIN — Medication 400 UNIT(S): at 12:35

## 2021-12-21 RX ADMIN — ENOXAPARIN SODIUM 40 MILLIGRAM(S): 100 INJECTION SUBCUTANEOUS at 12:35

## 2021-12-21 RX ADMIN — NYSTATIN CREAM 1 APPLICATION(S): 100000 CREAM TOPICAL at 17:33

## 2021-12-21 RX ADMIN — PIPERACILLIN AND TAZOBACTAM 25 GRAM(S): 4; .5 INJECTION, POWDER, LYOPHILIZED, FOR SOLUTION INTRAVENOUS at 17:33

## 2021-12-21 RX ADMIN — ALBUTEROL 2 PUFF(S): 90 AEROSOL, METERED ORAL at 15:20

## 2021-12-21 RX ADMIN — Medication 12.5 MILLIGRAM(S): at 17:33

## 2021-12-21 RX ADMIN — Medication 500 MILLIGRAM(S): at 12:35

## 2021-12-21 RX ADMIN — ALBUTEROL 2 PUFF(S): 90 AEROSOL, METERED ORAL at 20:42

## 2021-12-21 RX ADMIN — ALBUTEROL 2 PUFF(S): 90 AEROSOL, METERED ORAL at 09:57

## 2021-12-21 RX ADMIN — NYSTATIN CREAM 1 APPLICATION(S): 100000 CREAM TOPICAL at 05:52

## 2021-12-21 RX ADMIN — Medication 250 MILLIGRAM(S): at 17:33

## 2021-12-21 RX ADMIN — Medication 81 MILLIGRAM(S): at 12:35

## 2021-12-21 NOTE — CHART NOTE - NSCHARTNOTEFT_GEN_A_CORE
Source: Patient [ ]  Family [ ]   other [ ]    Current Diet:     Patient reports [ ] nausea  [ ] vomiting [ ] diarrhea [ ] constipation  [ ]chewing problems [ ] swallowing issues  [ ] other:     PO intake:  < 50% [ ]   50-75%  [ ]   %  [ ]  other :    Source for PO intake [ ] Patient [ ] family [ ] chart [ ] staff [ ] other    Enteral /Parenteral Nutrition:     Current Weight:     % Weight Change     Pertinent Medications: MEDICATIONS  (STANDING):  ALBUTerol    90 MICROgram(s) HFA Inhaler 2 Puff(s) Inhalation every 6 hours  ascorbic acid 500 milliGRAM(s) Oral daily  aspirin enteric coated 81 milliGRAM(s) Oral daily  cholecalciferol 400 Unit(s) Oral daily  enoxaparin Injectable 40 milliGRAM(s) SubCutaneous daily  furosemide    Tablet 20 milliGRAM(s) Oral daily  metoprolol tartrate 12.5 milliGRAM(s) Oral two times a day  multivitamin 1 Tablet(s) Oral daily  nystatin Powder 1 Application(s) Topical two times a day  piperacillin/tazobactam IVPB.. 3.375 Gram(s) IV Intermittent every 12 hours  saccharomyces boulardii 250 milliGRAM(s) Oral two times a day  tiotropium 18 MICROgram(s) Capsule 1 Capsule(s) Inhalation daily    MEDICATIONS  (PRN):  albuterol/ipratropium for Nebulization 3 milliLiter(s) Nebulizer every 6 hours PRN Shortness of Breath  traMADol 50 milliGRAM(s) Oral daily PRN Moderate to Severe Pain    Pertinent Labs: CBC Full  -  ( 20 Dec 2021 06:47 )  WBC Count : 9.68 K/uL  RBC Count : 3.92 M/uL  Hemoglobin : 11.1 g/dL  Hematocrit : 35.5 %  Platelet Count - Automated : 253 K/uL  Mean Cell Volume : 90.6 fl  Mean Cell Hemoglobin : 28.3 pg  Mean Cell Hemoglobin Concentration : 31.3 gm/dL        Skin:     Nutrition focused physical exam conducted - found signs of malnutrition [ ]absent [ ]present    Subcutaneous fat loss: [ ] Orbital fat pads region, [ ]Buccal fat region, [ ]Triceps region,  [ ]Ribs region    Muscle wasting: [ ]Temples region, [ ]Clavicle region, [ ]Shoulder region, [ ]Scapula region, [ ]Interosseous region,  [ ]thigh region, [ ]Calf region    Estimated Needs:   [ ] no change since previous assessment  [ ] recalculated:     Current Nutrition Diagnosis:    Recommendations:     Monitoring and Evaluation:   [ ] PO intake [ ] Tolerance to diet prescription [X] Weights  [X] Follow up per protocol [X] Labs: Source: Patient [ ]  Family [ ]   other [x ]    Current Diet:   Diet, DASH/TLC:   Sodium & Cholesterol Restricted  1000mL Fluid Restriction ([id number]) (12-14-21 @ 02:30)    Patient reports [ ] nausea  [ ] vomiting [ ] diarrhea [ ] constipation  [ ]chewing problems [ ] swallowing issues  [ ] other:     PO intake:  < 50% [ ]   50-75%  [ x]   %  [ x]  other :    Source for PO intake [ ] Patient [ ] family [ x] chart [x ] staff [ ] other    Current Weight:   (12/18)  164 lbs    % Weight Change: No recent weight documented     Pertinent Medications: MEDICATIONS  (STANDING):  ALBUTerol    90 MICROgram(s) HFA Inhaler 2 Puff(s) Inhalation every 6 hours  ascorbic acid 500 milliGRAM(s) Oral daily  aspirin enteric coated 81 milliGRAM(s) Oral daily  cholecalciferol 400 Unit(s) Oral daily  enoxaparin Injectable 40 milliGRAM(s) SubCutaneous daily  furosemide    Tablet 20 milliGRAM(s) Oral daily  metoprolol tartrate 12.5 milliGRAM(s) Oral two times a day  multivitamin 1 Tablet(s) Oral daily  nystatin Powder 1 Application(s) Topical two times a day  piperacillin/tazobactam IVPB.. 3.375 Gram(s) IV Intermittent every 12 hours  saccharomyces boulardii 250 milliGRAM(s) Oral two times a day  tiotropium 18 MICROgram(s) Capsule 1 Capsule(s) Inhalation daily    MEDICATIONS  (PRN):  albuterol/ipratropium for Nebulization 3 milliLiter(s) Nebulizer every 6 hours PRN Shortness of Breath  traMADol 50 milliGRAM(s) Oral daily PRN Moderate to Severe Pain    Pertinent Labs: CBC Full  -  ( 20 Dec 2021 06:47 )  WBC Count : 9.68 K/uL  RBC Count : 3.92 M/uL  Hemoglobin : 11.1 g/dL  Hematocrit : 35.5 %  Platelet Count - Automated : 253 K/uL  Mean Cell Volume : 90.6 fl  Mean Cell Hemoglobin : 28.3 pg  Mean Cell Hemoglobin Concentration : 31.3 gm/dL      Skin: Moisture Associated Dermatitis     Nutrition focused physical exam conducted - found signs of malnutrition [ ]absent [x ]present    Subcutaneous fat loss: [ x] Orbital fat pads region, [ x]Buccal fat region, [ ]Triceps region,  [ ]Ribs region    Muscle wasting: [x ]Temples region, [ ]Clavicle region, [ ]Shoulder region, [ ]Scapula region, [ ]Interosseous region,  [ ]thigh region, [ ]Calf region    Estimated Needs:   [ x] no change since previous assessment  [ ] recalculated:     Current Nutrition Diagnosis: Pt remains at high nutrition risk secondary to malnutrition (moderate chronic) related to inability to meet sufficient protein-energy needs in setting of COPD, CHF, decreased appetite/PO intake as evidenced by mild muscle/fat loss,likely meeting<75% EER >1 mo resulting in unintentional wt loss. Pt with new R sided PNA possibly 2/2 aspiration. Pt s/p SLP eval 12/20 recommending reg/thin. fecal incontinence noted, last documented BM 12/17. Pt continues with good PO intake completing 75% of meals.     Recommendations:   1) Add Ensure Pudding TID  2) Monitor weights daily for trend/accuracy   3) Encourage HBV protein sources     Monitoring and Evaluation:   [ x] PO intake [x ] Tolerance to diet prescription [X] Weights  [X] Follow up per protocol [X] Labs:

## 2021-12-21 NOTE — PROGRESS NOTE ADULT - ASSESSMENT
89 year old female with hx complete heart block s/p PPM (2014), HFpEF (last known EF 50-55%), HTN, chronic pain from severe OA of knees, COPD, HTN, HLD, non-compliant with statin, presenting with several days of exertional dyspnea after eating fast food, who was sent to hospital from PMD office for hypoxia. Pt admitted with acute on chronic diastolic CHF exacerbation. Pt was given IV diuresis, complicated by ITZ for which diuresis discontinued and resolution of ITZ. Pt remained euvolemic thereafter. But pt continued to require HFNC difficult to titrate until 12/17 when she was weaned to 5 l via nc. CT chest completed and found new right sided PNA on 12/18; B cx negative and pt empirically continues on zosyn for aspiration PNA. Pt medically improving, evaluated by PT and recommended for YOAN. Pending auth anticipated discharge in 24 hrs.     Acute hypoxemic respiratory failure likely due to Acute on Chronic diastolic HF and Aspiration PNA  - currently able to be weaned down to 3l via nc and saturating 94 %  - hypoxia persists   -c/w mx for HF and mx for aspiration PNA  - underlying COPD stable   -D-dimer elevated but WNL when corrected for age.   -LE duplex negative for DVT    Acute on chronic diastolic HFPEF   -currently on 3 L nasal cannula, difficult to wean further. Will likely wean more with increased mobilization.   -CXR on admission with pulm edema, BNP ~2000  -Volume status improved s/p IV lasix which was discontinued secondary to ITZ-  appears euvolemic now.   -c/w  lasix 20mg po qd  with parameters   -TTE reviewed; preserved EF. Per cardio no indication for ischemic eval.  -Wean supplemental O2 as tolerated; currently on 3l via nc.    -No further inpatient cardiac work up  - cardio f/u noted and appreciated, signed off   -Pulm recommendations noted and appreciated     Aspiration PNA  - improving   - afebrile  - no leukocytosis   - Bcx negative   - c/w zosyn D# 4/7 - will change to augmentin on discharge  - c/w florastor po bid  - c/w incentive spirometry   -c/w aspiration precautions   - swallow therapist consulted     Acute Kidney Injury on CKD stage 3 - resolved  -likely due to overdiuresis and renal hypoperfusion  -creatinine peaked at 2.02 and now has remained wnl for multiple days   -hold ARB    - lasix restarted will monitor creatine on diuretic   -maintain MAP > 65  -strict I/Os, daily weights  -avoid nephrotoxic agents    Hx COPD  - stable   -c/w duoneb q6hrs   -c/w albuterol, spiriva   - pulm f/u noted and appreciated     Hx HTN  -BP stable; no longer hypotensive  -c/w metoprolol po bid  - c/w lasix po qd   - no ARB secondary to initial ITZ     Hx HLD  -Per outpatient PMD note, pt declines to use statins  - c/w diet control     Hx chronic pain due to osteoarthritis  - currently not in any exacerbation   -c/w tramadol PRN    Hx of CHB s/p PPM  -PPM interrogated  - cardio f/u noted and appreciated, signed off     Moderate protein calorie malnutrition   -c/w supplementation  - nutrition evaluation appreciated      DVT ppx   -c/w  Lovenox Sq     Dispo: PT recommended YOAN, pt in agreeable for YOAN, wants to go to [facility]. Pending auth. D/w SW and CM. Anticipated discharge in 24 hrs.

## 2021-12-21 NOTE — PROGRESS NOTE ADULT - SUBJECTIVE AND OBJECTIVE BOX
Patient is a 89y old  Female who presents with a chief complaint of Acute hypoxemic respiratory failure due to suspected diastolic CHF with acute decompensation (21 Dec 2021 15:23) pending auth       HEALTH ISSUES - PROBLEM Dx:  SOB (shortness of breath)  Moderate COPD (chronic obstructive pulmonary disease)  Emphysema of lung  Acute respiratory failure with hypoxia  Acute diastolic congestive heart failure        INTERVAL HPI/OVERNIGHT EVENTS:  Patient seen and examined at bedside. No acute events overnight. Patient states she is feeling better, sob is at baseline. Pt still requiring o2, feel she is still weak. She does not feel she can go home. She wants to go to [facility], told her that CM will come speak to her with SW and plan will be for [facility]. Aside from this she is tolerating diet, had a bm yest. Patient denies chest pain, abd pain, N/V, fever, chills, dysuria or any other complaints. All remainder ROS negative.         Vital Signs Last 24 Hrs  T(C): 36.6 (21 Dec 2021 12:19), Max: 37.2 (20 Dec 2021 21:22)  T(F): 97.9 (21 Dec 2021 12:19), Max: 98.9 (20 Dec 2021 21:22)  HR: 70 (21 Dec 2021 12:19) (64 - 82)  BP: 104/60 (21 Dec 2021 12:52) (104/60 - 118/68)  BP(mean): --  RR: 18 (21 Dec 2021 12:19) (18 - 20)  SpO2: 96% (21 Dec 2021 15:20) (85% - 97%)    CONSTITUTIONAL: Well appearing,  awake, alert and in no apparent distress  CARDIAC: Normal rate, regular rhythm.  Heart sounds S1, S2.  No murmurs, rubs or gallops  PPM in place  RESPIRATORY: Fair air entry  slight rhales in R lung   on 3 l via nc   GASTROENTEROLOGY: Soft nt nd bs+normoactive   EXTREMITIES: No edema, cyanosis or deformity   NEUROLOGICAL: Alert and oriented, no focal deficits, no motor or sensory deficits.  SKIN: No rash, skin turgor wnl       MEDICATIONS  (STANDING):  ALBUTerol    90 MICROgram(s) HFA Inhaler 2 Puff(s) Inhalation every 6 hours  ascorbic acid 500 milliGRAM(s) Oral daily  aspirin enteric coated 81 milliGRAM(s) Oral daily  cholecalciferol 400 Unit(s) Oral daily  enoxaparin Injectable 40 milliGRAM(s) SubCutaneous daily  furosemide    Tablet 20 milliGRAM(s) Oral daily  metoprolol tartrate 12.5 milliGRAM(s) Oral two times a day  multivitamin 1 Tablet(s) Oral daily  nystatin Powder 1 Application(s) Topical two times a day  piperacillin/tazobactam IVPB.. 3.375 Gram(s) IV Intermittent every 12 hours  saccharomyces boulardii 250 milliGRAM(s) Oral two times a day  tiotropium 18 MICROgram(s) Capsule 1 Capsule(s) Inhalation daily    MEDICATIONS  (PRN):  albuterol/ipratropium for Nebulization 3 milliLiter(s) Nebulizer every 6 hours PRN Shortness of Breath  traMADol 50 milliGRAM(s) Oral daily PRN Moderate to Severe Pain      LABS:                        11.1   9.68  )-----------( 253      ( 20 Dec 2021 06:47 )             35.5     12-20    140  |  100  |  18.9  ----------------------------<  136<H>  4.5   |  29.0  |  0.89    Ca    8.6      20 Dec 2021 06:47  Mg     2.0     12-20      RADIOLOGY & ADDITIONAL TESTS:  no new imaging studies

## 2021-12-22 ENCOUNTER — TRANSCRIPTION ENCOUNTER (OUTPATIENT)
Age: 86
End: 2021-12-22

## 2021-12-22 PROCEDURE — 99232 SBSQ HOSP IP/OBS MODERATE 35: CPT

## 2021-12-22 RX ADMIN — ALBUTEROL 2 PUFF(S): 90 AEROSOL, METERED ORAL at 09:04

## 2021-12-22 RX ADMIN — Medication 12.5 MILLIGRAM(S): at 06:14

## 2021-12-22 RX ADMIN — Medication 20 MILLIGRAM(S): at 06:14

## 2021-12-22 RX ADMIN — NYSTATIN CREAM 1 APPLICATION(S): 100000 CREAM TOPICAL at 06:27

## 2021-12-22 RX ADMIN — Medication 1 TABLET(S): at 11:39

## 2021-12-22 RX ADMIN — Medication 250 MILLIGRAM(S): at 19:00

## 2021-12-22 RX ADMIN — ALBUTEROL 2 PUFF(S): 90 AEROSOL, METERED ORAL at 03:58

## 2021-12-22 RX ADMIN — ENOXAPARIN SODIUM 40 MILLIGRAM(S): 100 INJECTION SUBCUTANEOUS at 11:40

## 2021-12-22 RX ADMIN — ALBUTEROL 2 PUFF(S): 90 AEROSOL, METERED ORAL at 16:54

## 2021-12-22 RX ADMIN — Medication 500 MILLIGRAM(S): at 11:39

## 2021-12-22 RX ADMIN — Medication 81 MILLIGRAM(S): at 11:40

## 2021-12-22 RX ADMIN — TIOTROPIUM BROMIDE 1 CAPSULE(S): 18 CAPSULE ORAL; RESPIRATORY (INHALATION) at 09:01

## 2021-12-22 RX ADMIN — Medication 400 UNIT(S): at 11:40

## 2021-12-22 RX ADMIN — ALBUTEROL 2 PUFF(S): 90 AEROSOL, METERED ORAL at 20:37

## 2021-12-22 RX ADMIN — PIPERACILLIN AND TAZOBACTAM 25 GRAM(S): 4; .5 INJECTION, POWDER, LYOPHILIZED, FOR SOLUTION INTRAVENOUS at 06:15

## 2021-12-22 RX ADMIN — Medication 250 MILLIGRAM(S): at 06:14

## 2021-12-22 RX ADMIN — PIPERACILLIN AND TAZOBACTAM 25 GRAM(S): 4; .5 INJECTION, POWDER, LYOPHILIZED, FOR SOLUTION INTRAVENOUS at 19:00

## 2021-12-22 NOTE — DISCHARGE NOTE PROVIDER - NSDCMRMEDTOKEN_GEN_ALL_CORE_FT
ascorbic acid 500 mg oral capsule: 1 cap(s) orally 2 times a day  Ecotrin Adult Low Strength 81 mg oral delayed release tablet: 1 tab(s) orally once a day  furosemide 20 mg oral tablet: 1 tab(s) orally once a day  Multiple Vitamins oral tablet: 1 tab(s) orally once a day  traMADol 50 mg oral tablet: 1 tab(s) orally once a day, As Needed  valsartan 160 mg oral capsule: 1 tab(s) orally once a day  Vitamin B-100 oral tablet: 1 tab(s) orally once a day  Vitamin D3 400 intl units (10 mcg) oral tablet: 1 tab(s) orally once a day   ascorbic acid 500 mg oral capsule: 1 cap(s) orally 2 times a day  Augmentin 500 mg-125 mg oral tablet: 1 tab(s) orally every 8 hours  Ecotrin Adult Low Strength 81 mg oral delayed release tablet: 1 tab(s) orally once a day  furosemide 20 mg oral tablet: 1 tab(s) orally once a day  ipratropium-albuterol 0.5 mg-2.5 mg/3 mL inhalation solution: 3 milliliter(s) inhaled every 6 hours, As needed, Shortness of Breath  metoprolol tartrate 25 mg oral tablet: 0.5 tab(s) orally 2 times a day  Multiple Vitamins oral tablet: 1 tab(s) orally once a day  nystatin 100,000 units/g topical powder: 1 application topically 2 times a day  saccharomyces boulardii lyo 250 mg oral capsule: 1 cap(s) orally 2 times a day  tiotropium 18 mcg inhalation capsule: 1 cap(s) inhaled once a day  traMADol 50 mg oral tablet: 1 tab(s) orally once a day, As Needed  Vitamin B-100 oral tablet: 1 tab(s) orally once a day  Vitamin D3 400 intl units (10 mcg) oral tablet: 1 tab(s) orally once a day

## 2021-12-22 NOTE — DISCHARGE NOTE PROVIDER - CARE PROVIDER_API CALL
Primary Care Provider,   Phone: (   )    -  Fax: (   )    -  Follow Up Time: 1 week    Elijah Solitario)  Cardiovascular Disease; Internal Medicine; Interventional Cardiology  [address], [address]  [place] [address]  Phone: [phone]  Fax: [phone]  Follow Up Time:

## 2021-12-22 NOTE — PROGRESS NOTE ADULT - SUBJECTIVE AND OBJECTIVE BOX
Patient is a 89y old  Female who presents with a chief complaint of Acute hypoxemic respiratory failure due to suspected diastolic CHF with acute decompensation (21 Dec 2021 15:23) pending dispo       HEALTH ISSUES - PROBLEM Dx:  SOB (shortness of breath)  Moderate COPD (chronic obstructive pulmonary disease)  Emphysema of lung  Acute respiratory failure with hypoxia  Acute diastolic congestive heart failure      INTERVAL HPI/OVERNIGHT EVENTS:  Patient seen and examined at bedside. No acute events overnight. Patient states she is feeling well, is more happy that she is actually going to rehab. Understands that sometimes auth can take some time. Aside from this she reports she is eating well, worked with PT again today and needs further PT. Also reports last bm yest. Patient denies chest pain, abd pain, N/V, fever, chills, dysuria or any other complaints. All remainder ROS negative.     Vital Signs Last 24 Hrs  T(C): 36.6 (22 Dec 2021 11:26), Max: 37 (22 Dec 2021 06:33)  T(F): 97.9 (22 Dec 2021 11:26), Max: 98.6 (22 Dec 2021 06:33)  HR: 80 (22 Dec 2021 11:26) (68 - 80)  BP: 114/78 (22 Dec 2021 11:26) (114/78 - 122/68)  BP(mean): --  RR: 18 (22 Dec 2021 11:26) (16 - 18)  SpO2: 91% (22 Dec 2021 11:26) (91% - 98%)      I&O's Summary    21 Dec 2021 07:01  -  22 Dec 2021 07:00  --------------------------------------------------------  IN: 0 mL / OUT: 700 mL / NET: -700 mL      CONSTITUTIONAL: Well appearing,  awake, alert and in no apparent distress  CARDIAC: Normal rate, regular rhythm.  Heart sounds S1, S2.  No murmurs, rubs or gallops  PPM in place  RESPIRATORY: Fair air entry  no WRR  on 3 l via nc   GASTROENTEROLOGY: Soft nt nd bs+normoactive   EXTREMITIES: No edema, cyanosis or deformity   NEUROLOGICAL: Alert and oriented, no focal deficits, no motor or sensory deficits.  SKIN: No rash, skin turgor wnl     MEDICATIONS  (STANDING):  ALBUTerol    90 MICROgram(s) HFA Inhaler 2 Puff(s) Inhalation every 6 hours  ascorbic acid 500 milliGRAM(s) Oral daily  aspirin enteric coated 81 milliGRAM(s) Oral daily  cholecalciferol 400 Unit(s) Oral daily  enoxaparin Injectable 40 milliGRAM(s) SubCutaneous daily  furosemide    Tablet 20 milliGRAM(s) Oral daily  metoprolol tartrate 12.5 milliGRAM(s) Oral two times a day  multivitamin 1 Tablet(s) Oral daily  nystatin Powder 1 Application(s) Topical two times a day  piperacillin/tazobactam IVPB.. 3.375 Gram(s) IV Intermittent every 12 hours  saccharomyces boulardii 250 milliGRAM(s) Oral two times a day  tiotropium 18 MICROgram(s) Capsule 1 Capsule(s) Inhalation daily    MEDICATIONS  (PRN):  albuterol/ipratropium for Nebulization 3 milliLiter(s) Nebulizer every 6 hours PRN Shortness of Breath      LABS:  No new labs     RADIOLOGY & ADDITIONAL TESTS:  No new imaging studies

## 2021-12-22 NOTE — DISCHARGE NOTE PROVIDER - PROVIDER TOKENS
FREE:[LAST:[Primary Care Provider],PHONE:[(   )    -],FAX:[(   )    -],FOLLOWUP:[1 week]],PROVIDER:[TOKEN:[[id number]]]

## 2021-12-22 NOTE — DISCHARGE NOTE PROVIDER - HOSPITAL COURSE
89 year old female with pmhx complete heart block s/p PPM (2014), HFpEF (last known EF 50-55%), HTN, chronic pain from severe OA of knees, COPD, HTN, HLD, non-compliant with statin, presenting with several days of exertional dyspnea after eating fast food, who was sent to hospital from PMD office for hypoxia. CXR with pulm edema, BNP >2000. Admitted with acute hypoxemic respiratory failure 2/2 acute on chronic diastolic CHF exacerbation. LE duplex neg DVT, D- dimer neg when corrected for age. Cardiology saw the patient in consult. TTE performed, EF unchanged from prior, cardio did not want ischemic eval. Pulm also followed in cosnult. Pt was given IV diuresis, complicated by ITZ for which diuresis discontinued and resolution of ITZ. Pt remained euvolemic thereafter. Lasix restarted without any further complications, transitioned to PO. Hospital course prolonged 2/2 slow wean off HFNC; CT chest performed and revealed R sided PNA. Bcx negative, continued on zosyn for empiric treatment of a suspected aspiration PNA (to be discharged on augmentin). Pt medically improving, evaluated by PT and recommended for YOAN. The patient is now medically stable for discharge with appropriate outpatient follow up.     Time spent on discharge: 35 mins

## 2021-12-22 NOTE — DISCHARGE NOTE PROVIDER - NSDCCPCAREPLAN_GEN_ALL_CORE_FT
PRINCIPAL DISCHARGE DIAGNOSIS  Diagnosis: CHF exacerbation  Assessment and Plan of Treatment: Treated with lasix. Please follow up with cardiology nad your primary care provider within one week.      SECONDARY DISCHARGE DIAGNOSES  Diagnosis: Pneumonia, aspiration  Assessment and Plan of Treatment: Complete course of antibiotics.    Diagnosis: ITZ (acute kidney injury)  Assessment and Plan of Treatment: Resolved.

## 2021-12-22 NOTE — DISCHARGE NOTE PROVIDER - DETAILS OF MALNUTRITION DIAGNOSIS/DIAGNOSES
This patient has been assessed with a concern for Malnutrition and was treated during this hospitalization for the following Nutrition diagnosis/diagnoses:     -  12/18/2021: Moderate protein-calorie malnutrition

## 2021-12-23 ENCOUNTER — TRANSCRIPTION ENCOUNTER (OUTPATIENT)
Age: 86
End: 2021-12-23

## 2021-12-23 VITALS
SYSTOLIC BLOOD PRESSURE: 126 MMHG | HEART RATE: 82 BPM | TEMPERATURE: 99 F | RESPIRATION RATE: 16 BRPM | DIASTOLIC BLOOD PRESSURE: 78 MMHG | OXYGEN SATURATION: 95 %

## 2021-12-23 LAB
CULTURE RESULTS: SIGNIFICANT CHANGE UP
CULTURE RESULTS: SIGNIFICANT CHANGE UP
SARS-COV-2 RNA SPEC QL NAA+PROBE: SIGNIFICANT CHANGE UP
SPECIMEN SOURCE: SIGNIFICANT CHANGE UP
SPECIMEN SOURCE: SIGNIFICANT CHANGE UP

## 2021-12-23 PROCEDURE — 99239 HOSP IP/OBS DSCHRG MGMT >30: CPT

## 2021-12-23 PROCEDURE — 84145 PROCALCITONIN (PCT): CPT

## 2021-12-23 PROCEDURE — C8929: CPT

## 2021-12-23 PROCEDURE — 87086 URINE CULTURE/COLONY COUNT: CPT

## 2021-12-23 PROCEDURE — 94664 DEMO&/EVAL PT USE INHALER: CPT

## 2021-12-23 PROCEDURE — 82550 ASSAY OF CK (CPK): CPT

## 2021-12-23 PROCEDURE — 94640 AIRWAY INHALATION TREATMENT: CPT

## 2021-12-23 PROCEDURE — U0005: CPT

## 2021-12-23 PROCEDURE — 87040 BLOOD CULTURE FOR BACTERIA: CPT

## 2021-12-23 PROCEDURE — 83735 ASSAY OF MAGNESIUM: CPT

## 2021-12-23 PROCEDURE — 83880 ASSAY OF NATRIURETIC PEPTIDE: CPT

## 2021-12-23 PROCEDURE — 99285 EMERGENCY DEPT VISIT HI MDM: CPT

## 2021-12-23 PROCEDURE — 87070 CULTURE OTHR SPECIMN AEROBIC: CPT

## 2021-12-23 PROCEDURE — 81001 URINALYSIS AUTO W/SCOPE: CPT

## 2021-12-23 PROCEDURE — 93005 ELECTROCARDIOGRAM TRACING: CPT

## 2021-12-23 PROCEDURE — 97110 THERAPEUTIC EXERCISES: CPT

## 2021-12-23 PROCEDURE — 36415 COLL VENOUS BLD VENIPUNCTURE: CPT

## 2021-12-23 PROCEDURE — U0003: CPT

## 2021-12-23 PROCEDURE — 94760 N-INVAS EAR/PLS OXIMETRY 1: CPT

## 2021-12-23 PROCEDURE — 80053 COMPREHEN METABOLIC PANEL: CPT

## 2021-12-23 PROCEDURE — 84100 ASSAY OF PHOSPHORUS: CPT

## 2021-12-23 PROCEDURE — 80048 BASIC METABOLIC PNL TOTAL CA: CPT

## 2021-12-23 PROCEDURE — 97116 GAIT TRAINING THERAPY: CPT

## 2021-12-23 PROCEDURE — 93970 EXTREMITY STUDY: CPT

## 2021-12-23 PROCEDURE — 85379 FIBRIN DEGRADATION QUANT: CPT

## 2021-12-23 PROCEDURE — 96374 THER/PROPH/DIAG INJ IV PUSH: CPT

## 2021-12-23 PROCEDURE — 82803 BLOOD GASES ANY COMBINATION: CPT

## 2021-12-23 PROCEDURE — 71250 CT THORAX DX C-: CPT

## 2021-12-23 PROCEDURE — 82962 GLUCOSE BLOOD TEST: CPT

## 2021-12-23 PROCEDURE — 71045 X-RAY EXAM CHEST 1 VIEW: CPT

## 2021-12-23 PROCEDURE — 84484 ASSAY OF TROPONIN QUANT: CPT

## 2021-12-23 PROCEDURE — 97530 THERAPEUTIC ACTIVITIES: CPT

## 2021-12-23 PROCEDURE — 85027 COMPLETE CBC AUTOMATED: CPT

## 2021-12-23 PROCEDURE — 85025 COMPLETE CBC W/AUTO DIFF WBC: CPT

## 2021-12-23 RX ORDER — SACCHAROMYCES BOULARDII 250 MG
1 POWDER IN PACKET (EA) ORAL
Qty: 0 | Refills: 0 | DISCHARGE
Start: 2021-12-23 | End: 2021-12-24

## 2021-12-23 RX ORDER — METOPROLOL TARTRATE 50 MG
0.5 TABLET ORAL
Qty: 0 | Refills: 0 | DISCHARGE
Start: 2021-12-23

## 2021-12-23 RX ORDER — NYSTATIN CREAM 100000 [USP'U]/G
1 CREAM TOPICAL
Qty: 0 | Refills: 0 | DISCHARGE
Start: 2021-12-23 | End: 2021-12-30

## 2021-12-23 RX ORDER — IPRATROPIUM/ALBUTEROL SULFATE 18-103MCG
3 AEROSOL WITH ADAPTER (GRAM) INHALATION
Qty: 0 | Refills: 0 | DISCHARGE
Start: 2021-12-23

## 2021-12-23 RX ORDER — VALSARTAN 80 MG/1
1 TABLET ORAL
Qty: 0 | Refills: 0 | DISCHARGE

## 2021-12-23 RX ORDER — TIOTROPIUM BROMIDE 18 UG/1
1 CAPSULE ORAL; RESPIRATORY (INHALATION)
Qty: 0 | Refills: 0 | DISCHARGE
Start: 2021-12-23

## 2021-12-23 RX ADMIN — TIOTROPIUM BROMIDE 1 CAPSULE(S): 18 CAPSULE ORAL; RESPIRATORY (INHALATION) at 09:00

## 2021-12-23 RX ADMIN — Medication 81 MILLIGRAM(S): at 11:55

## 2021-12-23 RX ADMIN — ALBUTEROL 2 PUFF(S): 90 AEROSOL, METERED ORAL at 03:40

## 2021-12-23 RX ADMIN — Medication 1 TABLET(S): at 11:55

## 2021-12-23 RX ADMIN — Medication 500 MILLIGRAM(S): at 11:55

## 2021-12-23 RX ADMIN — Medication 250 MILLIGRAM(S): at 05:34

## 2021-12-23 RX ADMIN — Medication 400 UNIT(S): at 11:55

## 2021-12-23 RX ADMIN — NYSTATIN CREAM 1 APPLICATION(S): 100000 CREAM TOPICAL at 05:44

## 2021-12-23 RX ADMIN — ALBUTEROL 2 PUFF(S): 90 AEROSOL, METERED ORAL at 16:26

## 2021-12-23 RX ADMIN — PIPERACILLIN AND TAZOBACTAM 25 GRAM(S): 4; .5 INJECTION, POWDER, LYOPHILIZED, FOR SOLUTION INTRAVENOUS at 05:34

## 2021-12-23 RX ADMIN — Medication 12.5 MILLIGRAM(S): at 05:34

## 2021-12-23 RX ADMIN — ALBUTEROL 2 PUFF(S): 90 AEROSOL, METERED ORAL at 09:02

## 2021-12-23 RX ADMIN — ENOXAPARIN SODIUM 40 MILLIGRAM(S): 100 INJECTION SUBCUTANEOUS at 11:55

## 2021-12-23 NOTE — PROGRESS NOTE ADULT - SUBJECTIVE AND OBJECTIVE BOX
HPI  Pt is a 89yo F admitted to [facility] for acute hypoxic respiratory failure secondary to HFpEF  Pt was seen and examined at bedside. No overnight complaints. Currently on 3L NC 95%. Hemodynamically stable.     Vital Signs Last 24 Hrs  T(C): 36.3 (23 Dec 2021 10:30), Max: 36.8 (23 Dec 2021 04:18)  T(F): 97.4 (23 Dec 2021 10:30), Max: 98.2 (23 Dec 2021 04:18)  HR: 79 (23 Dec 2021 10:30) (71 - 92)  BP: 103/66 (23 Dec 2021 10:30) (100/64 - 114/78)  BP(mean): --  RR: 16 (23 Dec 2021 10:30) (16 - 18)  SpO2: 94% (23 Dec 2021 10:30) (91% - 98%)    I&O's Summary      CAPILLARY BLOOD GLUCOSE          PHYSICAL EXAM:    CONSTITUTIONAL: Well appearing,  awake, alert and in no apparent distress  CARDIAC: Normal rate, regular rhythm.  Heart sounds S1, S2.  No murmurs, rubs or gallops  PPM in place  RESPIRATORY: Fair air entry  no WRR  on 3 l via nc   GASTROENTEROLOGY: Soft nt nd bs+normoactive   EXTREMITIES: No edema, cyanosis or deformity   NEUROLOGICAL: Alert and oriented, no focal deficits, no motor or sensory deficits.  SKIN: No rash, skin turgor wnl     MEDICATIONS:  MEDICATIONS  (STANDING):  ALBUTerol    90 MICROgram(s) HFA Inhaler 2 Puff(s) Inhalation every 6 hours  ascorbic acid 500 milliGRAM(s) Oral daily  aspirin enteric coated 81 milliGRAM(s) Oral daily  cholecalciferol 400 Unit(s) Oral daily  enoxaparin Injectable 40 milliGRAM(s) SubCutaneous daily  furosemide    Tablet 20 milliGRAM(s) Oral daily  metoprolol tartrate 12.5 milliGRAM(s) Oral two times a day  multivitamin 1 Tablet(s) Oral daily  nystatin Powder 1 Application(s) Topical two times a day  piperacillin/tazobactam IVPB.. 3.375 Gram(s) IV Intermittent every 12 hours  saccharomyces boulardii 250 milliGRAM(s) Oral two times a day  tiotropium 18 MICROgram(s) Capsule 1 Capsule(s) Inhalation daily      LABS: All Labs Reviewed:                Blood Culture: 12-18 @ 18:46  Organism --  Gram Stain Blood -- Gram Stain --  Specimen Source .Blood Blood-Peripheral  Culture-Blood --        RADIOLOGY/EKG:    DVT PPX:    ADVANCED DIRECTIVE:    DISPOSITION:

## 2021-12-23 NOTE — PROGRESS NOTE ADULT - PROVIDER SPECIALTY LIST ADULT
Hospitalist
Cardiology
Cardiology
Hospitalist
Pulmonology
Hospitalist
Pulmonology
Pulmonology
Hospitalist

## 2021-12-23 NOTE — DISCHARGE NOTE NURSING/CASE MANAGEMENT/SOCIAL WORK - NSDCPEFALRISK_GEN_ALL_CORE
For information on Fall & Injury Prevention, visit: [email or web address] OR  [email or web address] OR  https://www.cdc.gov/steadi/patient.html

## 2021-12-23 NOTE — DISCHARGE NOTE NURSING/CASE MANAGEMENT/SOCIAL WORK - PATIENT PORTAL LINK FT
You can access the FollowMyHealth Patient Portal offered by [facility] by registering at the following website: [email or web address]. By joining [employer]’s FollowMyHealth portal, you will also be able to view your health information using other applications (apps) compatible with our system.

## 2021-12-23 NOTE — PROGRESS NOTE ADULT - REASON FOR ADMISSION
Acute hypoxemic respiratory failure due to suspected diastolic CHF with acute decompensation

## 2021-12-23 NOTE — PROGRESS NOTE ADULT - ASSESSMENT
89 year old female with hx complete heart block s/p PPM (2014), HFpEF (last known EF 50-55%), HTN, chronic pain from severe OA of knees, COPD, HTN, HLD, non-compliant with statin, presenting with several days of exertional dyspnea after eating fast food, who was sent to hospital from PMD office for hypoxia. Pt admitted with acute on chronic diastolic CHF exacerbation. Pt was given IV diuresis, complicated by ITZ for which diuresis discontinued and resolution of ITZ. Pt remained euvolemic thereafter. Lasix restarted without any further complications. But pt continued to require HFNC difficult to titrate until 12/17 when she was weaned to 5 l via nc. CT chest completed and found new right sided PNA on 12/18; B cx negative and pt empirically continues on zosyn for aspiration PNA. To be transitioned to po augmentin on discharge. Pt medically improving, evaluated by PT and recommended for YAON. Pending auth anticipated discharge in 24 hrs.     *Acute hypoxemic respiratory failure likely due to Acute on Chronic diastolic HF and Aspiration PNA  Persistant hypoxia, will need 3L NC   LE adispler neg for DVT   Cont abx for asp Pna      *Acute on chronic diastolic HFPEF   CXR on admission with pulm edema, BNP ~2000  Cont lasix 20mg qd   Per cardio no ischemic eval   Cardio f/u noted and signed off   pul consult appreciated    *Aspiration PNA   Bcx negative   c/w zosyn D# 6/7   change to augmentin on discharge  c/w florastor po bid  c/w incentive spirometry   swallow therapist consulted     *Acute Kidney Injury on CKD stage 3 - resolved  Hold ARB   Lasix restarted    *Hx COPD  stable   c/w duoneb q6hrs   c/w albuterol, spiriva    pulm f/u noted and appreciated     *Hx HTN  c/w metoprolol po bid  c/w lasix po qd   no ARB secondary to initial ITZ     *Hx HLD  -Per outpatient PMD note, pt declines to use statins  - c/w diet control     *Hx chronic pain due to osteoarthritis  currently not in any exacerbation   c/w tramadol PRN    *Hx of CHB s/p PPM  PPM interrogated  cardio f/u noted and appreciated, signed off     *Moderate protein calorie malnutrition   c/w supplementation  nutrition evaluation appreciated      *DVT ppx   c/w  Lovenox Sq     Dispo: PT recommended YOAN, pt in agreeable for YOAN, wants to go to [facility]. Also sent to other rehabs.  Pending auth. D/w SW and CM. Anticipated discharge in 24 hrs pending auth.

## 2021-12-23 NOTE — PROGRESS NOTE ADULT - NUTRITIONAL ASSESSMENT
This patient has been assessed with a concern for Malnutrition and has been determined to have a diagnosis/diagnoses of Moderate protein-calorie malnutrition.    This patient is being managed with:   Diet DASH/TLC-  Sodium & Cholesterol Restricted  1000mL Fluid Restriction ([id number])  Entered: Dec 14 2021  2:29AM    
This patient has been assessed with a concern for Malnutrition and has been determined to have a diagnosis/diagnoses of Moderate protein-calorie malnutrition.    This patient is being managed with:   Diet DASH/TLC-  Sodium & Cholesterol Restricted  1000mL Fluid Restriction ([id number])  Entered: Dec 14 2021  2:29AM    
This patient has been assessed with a concern for Malnutrition and has been determined to have a diagnosis/diagnoses of Moderate protein-calorie malnutrition.    This patient is being managed with:   Diet DASH/TLC-  Sodium & Cholesterol Restricted  1000mL Fluid Restriction ([id number])  Entered: Dec 14 2021  2:29AM    
This patient has been assessed with a concern for Malnutrition and has been determined to have a diagnosis/diagnoses of Moderate protein-calorie malnutrition.    This patient is being managed with:   Diet DASH/TLC-  Sodium & Cholesterol Restricted  1000mL Fluid Restriction ([id number])  Entered: Dec 14 2021  2:29AM    
This patient has been assessed with a concern for Malnutrition and has been determined to have a diagnosis/diagnoses of Moderate protein-calorie malnutrition.    This patient is being managed with:   Diet DASH/TLC-  Sodium & Cholesterol Restricted  1000mL Fluid Restriction ([id number])  Entered: Dec 14 2021  2:29AM    
This patient has been assessed with a concern for Malnutrition and has been determined to have a diagnosis/diagnoses of Moderate protein-calorie malnutrition.    This patient is being managed with:   Diet DASH/TLC-  Sodium & Cholesterol Restricted  1000mL Fluid Restriction ([id number])  Entered: Dec 14 2021  2:29AM

## 2022-03-17 ENCOUNTER — APPOINTMENT (OUTPATIENT)
Dept: PULMONOLOGY | Facility: CLINIC | Age: 87
End: 2022-03-17
Payer: MEDICARE

## 2022-03-17 VITALS — HEART RATE: 71 BPM | OXYGEN SATURATION: 97 %

## 2022-03-17 VITALS — SYSTOLIC BLOOD PRESSURE: 112 MMHG | BODY MASS INDEX: 31.56 KG/M2 | WEIGHT: 151 LBS | DIASTOLIC BLOOD PRESSURE: 88 MMHG

## 2022-03-17 DIAGNOSIS — J96.11 CHRONIC RESPIRATORY FAILURE WITH HYPOXIA: ICD-10-CM

## 2022-03-17 DIAGNOSIS — E66.01 MORBID (SEVERE) OBESITY DUE TO EXCESS CALORIES: ICD-10-CM

## 2022-03-17 DIAGNOSIS — R06.00 DYSPNEA, UNSPECIFIED: ICD-10-CM

## 2022-03-17 DIAGNOSIS — K21.00 GASTRO-ESOPHAGEAL REFLUX DISEASE WITH ESOPHAGITIS, WITHOUT BLEEDING: ICD-10-CM

## 2022-03-17 PROCEDURE — 99213 OFFICE O/P EST LOW 20 MIN: CPT

## 2022-03-17 RX ORDER — APIXABAN 5 MG/1
5 TABLET, FILM COATED ORAL
Refills: 0 | Status: DISCONTINUED | COMMUNITY
Start: 2018-11-20 | End: 2022-03-17

## 2022-03-17 RX ORDER — VALSARTAN 160 MG/1
160 TABLET, COATED ORAL
Refills: 0 | Status: DISCONTINUED | COMMUNITY
End: 2022-03-17

## 2022-03-17 RX ORDER — METOPROLOL TARTRATE 1 MG/ML
INJECTION, SOLUTION INTRAVENOUS
Refills: 0 | Status: ACTIVE | COMMUNITY

## 2022-03-17 NOTE — PHYSICAL EXAM
[Normal Conjunctiva] : the conjunctiva exhibited no abnormalities [Enlarged Base of the Tongue] : enlargement of the base of the tongue [II] : II [Neck Appearance] : the appearance of the neck was normal [Jugular Venous Distention Increased] : there was no jugular-venous distention [Thyroid Diffuse Enlargement] : the thyroid was not enlarged [Heart Sounds] : normal S1 and S2 [Arterial Pulses Normal] : the arterial pulses were normal [Edema] : no peripheral edema present [Respiration, Rhythm And Depth] : normal respiratory rhythm and effort [Auscultation Breath Sounds / Voice Sounds] : lungs were clear to auscultation bilaterally [Lungs Percussion] : the lungs were normal to percussion [Bowel Sounds] : normal bowel sounds [Abdomen Soft] : soft [Abdomen Tenderness] : non-tender [Abnormal Walk] : normal gait [Nail Clubbing] : no clubbing of the fingernails [Cyanosis, Localized] : no localized cyanosis [No Focal Deficits] : no focal deficits [Oriented To Time, Place, And Person] : oriented to person, place, and time [Impaired Insight] : insight and judgment were intact [Affect] : the affect was normal [Memory Recent] : recent memory was not impaired [Skin Turgor] : normal skin turgor [] : no rash [FreeTextEntry1] : obese

## 2022-03-17 NOTE — CONSULT LETTER
[Dear  ___] : Dear  [unfilled], [Consult Letter:] : I had the pleasure of evaluating your patient, [unfilled]. [Consult Closing:] : Thank you very much for allowing me to participate in the care of this patient.  If you have any questions, please do not hesitate to contact me. [Please see my note below.] : Please see my note below. [Sincerely,] : Sincerely, [Leo Cochran MD] : Leo Cochran MD

## 2022-06-08 NOTE — ED ADULT NURSE NOTE - NS_SISCREENINGSR_GEN_ALL_ED
----- Message from Lidia Priest MA sent at 6/8/2022 11:49 AM CDT -----  Type:  Patient Returning Call    Who Called: pt wife  Who Left Message for Patient: Radiology and staff concerning procedure  Does the patient know what this is regarding?: yes  Would the patient rather a call back or a response via [facility]? Call back  Best Call Back Number: [phone]  Additional Information: appt was suppose to be cancelled and still showing appt for today and wants to know about the appt notes for the appt on 6/20. Please contact pt to discuss changes       
Negative

## 2022-11-01 NOTE — ED ADULT NURSE NOTE - ED STAT RN HAND OFF
Medical Necessity Clause: This procedure was medically necessary because the lesion that was treated was: Handoff

## 2023-03-13 NOTE — H&P ADULT - NSCORESITESY/N_GEN_A_CORE_RD
Cyclobenzaprine (Flexeril) refill request.   - Must have been seen within past 12 months.  - Must have been on stable dose for 30 days with no side effects.  - Labs required within past 12 months.  - PDMP review not required.  - If protocol met, OK to provide 90 day supply.    Last office visit: 2/28/2023  Next office visit: Visit date not found    Lab Results   Component Value Date    BUN 12 11/09/2021    CREATININE 0.70 11/09/2021    BCRAT 30 (H) 06/18/2012    GFRESTIMATE >90 11/09/2021     Lab Results   Component Value Date    AST 23 06/18/2012    GPT 42 06/18/2012    ALKPT 57 06/18/2012       DISPOSITION: Refilled per protocol; 3 month supply sent     
No

## 2023-11-14 ENCOUNTER — APPOINTMENT (OUTPATIENT)
Dept: PULMONOLOGY | Facility: CLINIC | Age: 88
End: 2023-11-14

## 2024-01-10 ENCOUNTER — EMERGENCY (EMERGENCY)
Facility: HOSPITAL | Age: 89
LOS: 1 days | End: 2024-01-10
Attending: EMERGENCY MEDICINE
Payer: MEDICARE

## 2024-01-10 DIAGNOSIS — Z95.0 PRESENCE OF CARDIAC PACEMAKER: Chronic | ICD-10-CM

## 2024-01-10 DIAGNOSIS — Z45.018 ENCOUNTER FOR ADJUSTMENT AND MANAGEMENT OF OTHER PART OF CARDIAC PACEMAKER: Chronic | ICD-10-CM

## 2024-01-10 DIAGNOSIS — H26.40 UNSPECIFIED SECONDARY CATARACT: Chronic | ICD-10-CM

## 2024-01-10 DIAGNOSIS — Z98.89 OTHER SPECIFIED POSTPROCEDURAL STATES: Chronic | ICD-10-CM

## 2024-01-10 PROCEDURE — 99285 EMERGENCY DEPT VISIT HI MDM: CPT | Mod: 25

## 2024-01-10 PROCEDURE — 92950 HEART/LUNG RESUSCITATION CPR: CPT

## 2024-01-10 PROCEDURE — 99291 CRITICAL CARE FIRST HOUR: CPT | Mod: 25

## 2024-01-10 RX ORDER — AMIODARONE HYDROCHLORIDE 400 MG/1
150 TABLET ORAL ONCE
Refills: 0 | Status: DISCONTINUED | OUTPATIENT
Start: 2024-01-10 | End: 2024-01-17

## 2024-01-10 NOTE — ED PROVIDER NOTE - CLINICAL SUMMARY MEDICAL DECISION MAKING FREE TEXT BOX
89yoF; with PMH Complete heart block s/p PPM (2014), HFpEF (last known EF 50-55%), HTN, chronic pain from severe OA of knees, COPD, HTN, HLD; now presenting in cardiac arrest. patient was in her normal state of health until yesterday when she felt tired. grandson states she was last seen normal 4am when he checked on her.  states he found her on ground at 7:30am unresponsive.  found by EMS to be in Asystole, intubated, ACLS started in the field, ROSC achieved immediately prior to arrival, but pulse lost immediately upon transfer to ED stretcher when pulse checked, PEA.  ACLS continued.   Resuscitative efforts continued in the ED, CVC placed, medications given, but patient continued to be in PEA arrest, TOD called 920am.  family states they would also like to discontinue resuscitative efforts. clergy offered and declined.  ME contacted and refused case.

## 2024-01-10 NOTE — ED PROVIDER NOTE - PHYSICAL EXAMINATION
General:    unresponsive  Head:     NC/AT, pupils fixed  Neck:     trachea midline  Lungs:     b/l breath sounds with ambu-ventilation  CVS:     no cardiac activity on bedside US, no femoral pulse  Abd:     soft  Ext:   mottled  Neuro: unresponsive,

## 2024-01-10 NOTE — ED PROVIDER NOTE - OBJECTIVE STATEMENT
89yoF; with PMH Complete heart block s/p PPM (2014), HFpEF (last known EF 50-55%), HTN, chronic pain from severe OA of knees, COPD, HTN, HLD; now presenting in cardiac arrest. patient was in her normal state of health until yesterday when she felt tired. grandson states she was last seen normal 4am when he checked on her.  states he found her on ground at 7:30am unresponsive.  found by EMS to be in Asystole, intubated, ACLS started in the field, ROSC achieved immediately prior to arrival, but pulse lost immediately upon transfer to ED stretcher when pulse checked, PEA.  ACLS continued.   PMH: HTN, CHB s/p PPM, HTN, OA, COPD, HTN, HLD

## 2024-01-10 NOTE — ED ADULT TRIAGE NOTE - CHIEF COMPLAINT QUOTE
pt BIBA from home, cardiac arrest on scene.  as per EMS, pt was last seen at 0400 by madina.  ROSC obtained in the field, levophed infusing upon arrival.  intubated in the field.

## 2024-01-10 NOTE — ED PROVIDER NOTE - EMS DETAILS FREE TEXT FOR MDM ADDL HISTORY OBTAINED FROM QUESTION
ems states patient originally found in asystolic arrest, intubated in field, PEA, then rosc, PEA arrest again upon arrival

## 2024-01-10 NOTE — ED PROVIDER NOTE - CRITICAL CARE ATTENDING CONTRIBUTION TO CARE
Upon my evaluation, this patient had a high probability of imminent or life-threatening deterioration due to __CARDIAC ARREST___ , which required my direct attention, intervention, and personal management.    I have personally provided 40__ minutes of critical care time exclusive of time spent on separately billable procedures.  Time includes review of laboratory data, radiology results, discussion with consultants, and monitoring for potential decompensation.  Interventions were performed as documented.

## 2024-04-04 NOTE — DISCHARGE NOTE ADULT - PAIN PRESENT
Patient with acute kidney injury/acute renal failure likely due to pre-renal azotemia due to dehydration BRENDA is currently stable. Baseline creatinine  1  - Labs reviewed- Renal function/electrolytes with Estimated Creatinine Clearance: 32.1 mL/min (A) (based on SCr of 2 mg/dL (H)). according to latest data. Monitor urine output and serial BMP and adjust therapy as needed. Avoid nephrotoxins and renally dose meds for GFR listed above.  FENA:  1.6  Previous Echo showed an EF of 20-25%, patient saturating well on room air, has no signs of volume overload.  Plan to cautiously administer IV fluids.   No

## 2025-04-03 NOTE — PROGRESS NOTE ADULT - ASSESSMENT
89 year old female with hx complete heart block s/p PPM (2014), HFpEF (last known EF 50-55%), HTN, chronic pain from severe OA of knees, COPD, HTN, HLD, non-compliant with statin, presenting with several days of exertional dyspnea after eating fast food, who was sent to hospital from PMD office for hypoxia. Pt admitted with acute on chronic diastolic CHF exacerbation. Pt was given IV diuresis, complicated by ITZ for which diuresis discontinued and resolution of ITZ. Pt remained euvolemic thereafter. Lasix restarted without any further complications. But pt continued to require HFNC difficult to titrate until 12/17 when she was weaned to 5 l via nc. CT chest completed and found new right sided PNA on 12/18; B cx negative and pt empirically continues on zosyn for aspiration PNA. To be transitioned to po augmentin on discharge. Pt medically improving, evaluated by PT and recommended for YOAN. Pending auth anticipated discharge in 24 hrs.     Acute hypoxemic respiratory failure likely due to Acute on Chronic diastolic HF and Aspiration PNA  - currently able to be weaned down to 3l via nc and saturating 94 %  - hypoxia persists   -c/w mx for HF and mx for aspiration PNA  - underlying COPD stable   -D-dimer elevated but WNL when corrected for age.   -LE duplex negative for DVT    Acute on chronic diastolic HFPEF   - has remained euvolemic   -currently on 3 L nasal cannula, difficult to wean further. Will likely wean more with increased mobilization.   -CXR on admission with pulm edema, BNP ~2000  -c/w  lasix 20mg po qd  with parameters   -TTE reviewed; preserved EF. Per cardio no indication for ischemic eval.  -Wean supplemental O2 as tolerated; currently on 3l via nc.    - No further inpatient cardiac work up  - cardio f/u noted and appreciated, signed off   -Pulm recommendations noted and appreciated     Aspiration PNA  - improving   - afebrile  - no leukocytosis   - Bcx negative   - c/w zosyn D# 5/7 - will change to augmentin on discharge  - c/w florastor po bid  - c/w incentive spirometry   -c/w aspiration precautions   - swallow therapist consulted     Acute Kidney Injury on CKD stage 3 - resolved  -likely due to overdiuresis and renal hypoperfusion  -creatinine peaked at 2.02 and now has remained wnl for multiple days   -hold ARB    - lasix restarted will monitor creatine on diuretic   -maintain MAP > 65  -strict I/Os, daily weights  -avoid nephrotoxic agents  - pt to have renal fxn monitored outpt     Hx COPD  - stable   -c/w duoneb q6hrs   -c/w albuterol, spiriva   - pulm f/u noted and appreciated     Hx HTN  -BP stable; no longer hypotensive  -c/w metoprolol po bid  - c/w lasix po qd   - no ARB secondary to initial ITZ     Hx HLD  -Per outpatient PMD note, pt declines to use statins  - c/w diet control     Hx chronic pain due to osteoarthritis  - currently not in any exacerbation   -c/w tramadol PRN    Hx of CHB s/p PPM  -PPM interrogated  - cardio f/u noted and appreciated, signed off     Moderate protein calorie malnutrition   -c/w supplementation  - nutrition evaluation appreciated      DVT ppx   -c/w  Lovenox Sq     Dispo: PT recommended YOAN, pt in agreeable for YOAN, wants to go to [facility]. Also sent to other rehabs.  Pending auth. D/w SW and CM. Anticipated discharge in 24 hrs pending auth.  03-Apr-2025 09:20

## 2025-05-25 NOTE — DIETITIAN INITIAL EVALUATION ADULT. - PROBLEM/PLAN-1
PAST SURGICAL HISTORY:  History of placement of ear tubes     History of placement of ear tubes 2011,2015    
DISPLAY PLAN FREE TEXT
